# Patient Record
Sex: MALE | Race: WHITE | NOT HISPANIC OR LATINO | URBAN - METROPOLITAN AREA
[De-identification: names, ages, dates, MRNs, and addresses within clinical notes are randomized per-mention and may not be internally consistent; named-entity substitution may affect disease eponyms.]

---

## 2020-02-03 ENCOUNTER — HOSPITAL ENCOUNTER (INPATIENT)
Facility: HOSPITAL | Age: 25
LOS: 2 days | Discharge: HOME | DRG: 603 | End: 2020-02-05
Attending: EMERGENCY MEDICINE | Admitting: EMERGENCY MEDICINE

## 2020-02-03 DIAGNOSIS — L02.419 ABSCESS OF ANKLE: ICD-10-CM

## 2020-02-03 DIAGNOSIS — L03.116 CELLULITIS OF LEFT LOWER EXTREMITY: Primary | ICD-10-CM

## 2020-02-03 DIAGNOSIS — R00.0 TACHYCARDIA: ICD-10-CM

## 2020-02-03 DIAGNOSIS — L03.116 CELLULITIS OF LEFT ANKLE: ICD-10-CM

## 2020-02-03 PROBLEM — G80.8 OTHER CEREBRAL PALSY: Status: ACTIVE | Noted: 2020-02-03

## 2020-02-03 LAB
ALBUMIN SERPL BCP-MCNC: 3.8 G/DL (ref 3.5–5.2)
ALP SERPL-CCNC: 82 U/L (ref 55–135)
ALT SERPL W/O P-5'-P-CCNC: 27 U/L (ref 10–44)
ANION GAP SERPL CALC-SCNC: 13 MMOL/L (ref 8–16)
AST SERPL-CCNC: 22 U/L (ref 10–40)
BASOPHILS # BLD AUTO: 0.04 K/UL (ref 0–0.2)
BASOPHILS NFR BLD: 0.3 % (ref 0–1.9)
BILIRUB SERPL-MCNC: 0.6 MG/DL (ref 0.1–1)
BILIRUB UR QL STRIP: NEGATIVE
BUN SERPL-MCNC: 7 MG/DL (ref 6–20)
CALCIUM SERPL-MCNC: 10.1 MG/DL (ref 8.7–10.5)
CHLORIDE SERPL-SCNC: 100 MMOL/L (ref 95–110)
CLARITY UR: CLEAR
CO2 SERPL-SCNC: 25 MMOL/L (ref 23–29)
COLOR UR: YELLOW
CREAT SERPL-MCNC: 0.8 MG/DL (ref 0.5–1.4)
DIFFERENTIAL METHOD: ABNORMAL
EOSINOPHIL # BLD AUTO: 0 K/UL (ref 0–0.5)
EOSINOPHIL NFR BLD: 0.2 % (ref 0–8)
ERYTHROCYTE [DISTWIDTH] IN BLOOD BY AUTOMATED COUNT: 11.9 % (ref 11.5–14.5)
EST. GFR  (AFRICAN AMERICAN): >60 ML/MIN/1.73 M^2
EST. GFR  (NON AFRICAN AMERICAN): >60 ML/MIN/1.73 M^2
GLUCOSE SERPL-MCNC: 104 MG/DL (ref 70–110)
GLUCOSE UR QL STRIP: NEGATIVE
HCT VFR BLD AUTO: 46.4 % (ref 40–54)
HGB BLD-MCNC: 15.5 G/DL (ref 14–18)
HGB UR QL STRIP: NEGATIVE
HIV 1+2 AB+HIV1 P24 AG SERPL QL IA: NEGATIVE
IMM GRANULOCYTES # BLD AUTO: 0.07 K/UL (ref 0–0.04)
IMM GRANULOCYTES NFR BLD AUTO: 0.5 % (ref 0–0.5)
KETONES UR QL STRIP: NEGATIVE
LACTATE SERPL-SCNC: 1.4 MMOL/L (ref 0.5–2.2)
LACTATE SERPL-SCNC: 1.6 MMOL/L (ref 0.5–2.2)
LEUKOCYTE ESTERASE UR QL STRIP: NEGATIVE
LYMPHOCYTES # BLD AUTO: 1.3 K/UL (ref 1–4.8)
LYMPHOCYTES NFR BLD: 9.5 % (ref 18–48)
MCH RBC QN AUTO: 29.8 PG (ref 27–31)
MCHC RBC AUTO-ENTMCNC: 33.4 G/DL (ref 32–36)
MCV RBC AUTO: 89 FL (ref 82–98)
MONOCYTES # BLD AUTO: 1 K/UL (ref 0.3–1)
MONOCYTES NFR BLD: 7.1 % (ref 4–15)
NEUTROPHILS # BLD AUTO: 11.4 K/UL (ref 1.8–7.7)
NEUTROPHILS NFR BLD: 82.4 % (ref 38–73)
NITRITE UR QL STRIP: NEGATIVE
NRBC BLD-RTO: 0 /100 WBC
PH UR STRIP: 7 [PH] (ref 5–8)
PLATELET # BLD AUTO: 339 K/UL (ref 150–350)
PMV BLD AUTO: 10.4 FL (ref 9.2–12.9)
POTASSIUM SERPL-SCNC: 4.1 MMOL/L (ref 3.5–5.1)
PROT SERPL-MCNC: 8.5 G/DL (ref 6–8.4)
PROT UR QL STRIP: NEGATIVE
RBC # BLD AUTO: 5.21 M/UL (ref 4.6–6.2)
SODIUM SERPL-SCNC: 138 MMOL/L (ref 136–145)
SP GR UR STRIP: <=1.005 (ref 1–1.03)
URN SPEC COLLECT METH UR: ABNORMAL
UROBILINOGEN UR STRIP-ACNC: ABNORMAL EU/DL
WBC # BLD AUTO: 13.84 K/UL (ref 3.9–12.7)

## 2020-02-03 PROCEDURE — 63600175 PHARM REV CODE 636 W HCPCS: Performed by: EMERGENCY MEDICINE

## 2020-02-03 PROCEDURE — 93010 ELECTROCARDIOGRAM REPORT: CPT | Mod: ,,, | Performed by: INTERNAL MEDICINE

## 2020-02-03 PROCEDURE — 87040 BLOOD CULTURE FOR BACTERIA: CPT | Mod: 59

## 2020-02-03 PROCEDURE — 63600175 PHARM REV CODE 636 W HCPCS: Performed by: PHYSICIAN ASSISTANT

## 2020-02-03 PROCEDURE — 25500020 PHARM REV CODE 255: Performed by: EMERGENCY MEDICINE

## 2020-02-03 PROCEDURE — 11000001 HC ACUTE MED/SURG PRIVATE ROOM

## 2020-02-03 PROCEDURE — 81003 URINALYSIS AUTO W/O SCOPE: CPT

## 2020-02-03 PROCEDURE — 25000003 PHARM REV CODE 250: Performed by: EMERGENCY MEDICINE

## 2020-02-03 PROCEDURE — 86703 HIV-1/HIV-2 1 RESULT ANTBDY: CPT

## 2020-02-03 PROCEDURE — 85025 COMPLETE CBC W/AUTO DIFF WBC: CPT

## 2020-02-03 PROCEDURE — 83605 ASSAY OF LACTIC ACID: CPT

## 2020-02-03 PROCEDURE — 93010 EKG 12-LEAD: ICD-10-PCS | Mod: ,,, | Performed by: INTERNAL MEDICINE

## 2020-02-03 PROCEDURE — 25000003 PHARM REV CODE 250: Performed by: FAMILY MEDICINE

## 2020-02-03 PROCEDURE — 99285 EMERGENCY DEPT VISIT HI MDM: CPT | Mod: 25

## 2020-02-03 PROCEDURE — 96375 TX/PRO/DX INJ NEW DRUG ADDON: CPT

## 2020-02-03 PROCEDURE — 93005 ELECTROCARDIOGRAM TRACING: CPT

## 2020-02-03 PROCEDURE — 80053 COMPREHEN METABOLIC PANEL: CPT

## 2020-02-03 PROCEDURE — 10060 I&D ABSCESS SIMPLE/SINGLE: CPT

## 2020-02-03 PROCEDURE — 96365 THER/PROPH/DIAG IV INF INIT: CPT

## 2020-02-03 PROCEDURE — S0077 INJECTION, CLINDAMYCIN PHOSP: HCPCS | Performed by: EMERGENCY MEDICINE

## 2020-02-03 RX ORDER — CLINDAMYCIN PHOSPHATE 600 MG/50ML
600 INJECTION, SOLUTION INTRAVENOUS
Status: DISCONTINUED | OUTPATIENT
Start: 2020-02-04 | End: 2020-02-05 | Stop reason: HOSPADM

## 2020-02-03 RX ORDER — BACLOFEN 20 MG/1
20 TABLET ORAL 4 TIMES DAILY
COMMUNITY
End: 2020-05-15 | Stop reason: SDUPTHER

## 2020-02-03 RX ORDER — MORPHINE SULFATE 4 MG/ML
4 INJECTION, SOLUTION INTRAMUSCULAR; INTRAVENOUS
Status: COMPLETED | OUTPATIENT
Start: 2020-02-03 | End: 2020-02-03

## 2020-02-03 RX ORDER — CLINDAMYCIN PHOSPHATE 600 MG/50ML
600 INJECTION, SOLUTION INTRAVENOUS ONCE
Status: COMPLETED | OUTPATIENT
Start: 2020-02-03 | End: 2020-02-03

## 2020-02-03 RX ORDER — HYDROCODONE BITARTRATE AND ACETAMINOPHEN 5; 325 MG/1; MG/1
1 TABLET ORAL EVERY 6 HOURS PRN
Status: DISCONTINUED | OUTPATIENT
Start: 2020-02-03 | End: 2020-02-05 | Stop reason: HOSPADM

## 2020-02-03 RX ORDER — LIDOCAINE HYDROCHLORIDE 10 MG/ML
10 INJECTION, SOLUTION EPIDURAL; INFILTRATION; INTRACAUDAL; PERINEURAL
Status: COMPLETED | OUTPATIENT
Start: 2020-02-03 | End: 2020-02-03

## 2020-02-03 RX ORDER — HYDROMORPHONE HYDROCHLORIDE 2 MG/ML
1 INJECTION, SOLUTION INTRAMUSCULAR; INTRAVENOUS; SUBCUTANEOUS
Status: COMPLETED | OUTPATIENT
Start: 2020-02-03 | End: 2020-02-03

## 2020-02-03 RX ORDER — ONDANSETRON 2 MG/ML
4 INJECTION INTRAMUSCULAR; INTRAVENOUS
Status: COMPLETED | OUTPATIENT
Start: 2020-02-03 | End: 2020-02-03

## 2020-02-03 RX ORDER — IBUPROFEN 200 MG
1 TABLET ORAL DAILY
Status: DISCONTINUED | OUTPATIENT
Start: 2020-02-04 | End: 2020-02-05 | Stop reason: HOSPADM

## 2020-02-03 RX ORDER — DOCUSATE SODIUM 100 MG/1
100 CAPSULE, LIQUID FILLED ORAL
COMMUNITY
Start: 2016-11-01

## 2020-02-03 RX ORDER — DIVALPROEX SODIUM 250 MG/1
250 TABLET, DELAYED RELEASE ORAL
Status: ON HOLD | COMMUNITY
Start: 2020-01-15 | End: 2020-05-24

## 2020-02-03 RX ORDER — GABAPENTIN 300 MG/1
300 CAPSULE ORAL 3 TIMES DAILY
COMMUNITY
End: 2020-05-15 | Stop reason: SDUPTHER

## 2020-02-03 RX ADMIN — CLINDAMYCIN IN 5 PERCENT DEXTROSE 600 MG: 12 INJECTION, SOLUTION INTRAVENOUS at 11:02

## 2020-02-03 RX ADMIN — HYDROCODONE BITARTRATE AND ACETAMINOPHEN 1 TABLET: 5; 325 TABLET ORAL at 10:02

## 2020-02-03 RX ADMIN — MORPHINE SULFATE 4 MG: 4 INJECTION INTRAVENOUS at 04:02

## 2020-02-03 RX ADMIN — HYDROMORPHONE HYDROCHLORIDE 1 MG: 2 INJECTION INTRAMUSCULAR; INTRAVENOUS; SUBCUTANEOUS at 06:02

## 2020-02-03 RX ADMIN — SODIUM CHLORIDE 2052 ML: 0.9 INJECTION, SOLUTION INTRAVENOUS at 03:02

## 2020-02-03 RX ADMIN — LIDOCAINE HYDROCHLORIDE 100 MG: 10 INJECTION, SOLUTION EPIDURAL; INFILTRATION; INTRACAUDAL; PERINEURAL at 06:02

## 2020-02-03 RX ADMIN — ONDANSETRON 4 MG: 2 INJECTION INTRAMUSCULAR; INTRAVENOUS at 04:02

## 2020-02-03 RX ADMIN — IOHEXOL 75 ML: 350 INJECTION, SOLUTION INTRAVENOUS at 05:02

## 2020-02-03 RX ADMIN — CLINDAMYCIN IN 5 PERCENT DEXTROSE 600 MG: 12 INJECTION, SOLUTION INTRAVENOUS at 04:02

## 2020-02-03 NOTE — ED PROVIDER NOTES
2:15 PM: Left ankle abscess for a week.  Pt was placed back in lobby. I explained to pt that due to lack of available rooms pt was seen by myself to begin the workup. Pt understands they will be seen and dispositioned by the next available physician. Pt voices understanding and agrees with plan. Pt also understands the longer than normal wait time. I am removing myself from the care of pt and pt will now be assigned to the next available physician.     Chantell Beckham PA-C  2:15 PM      SCRIBE #1 NOTE: I, Samuel Gutierrez, am scribing for, and in the presence of, Navneet Taylor Do, MD. I have scribed the entire note.       History     Chief Complaint   Patient presents with    Abscess     L ankle x 1 week; redness, swelling, and tenderness to L ankle      Review of patient's allergies indicates:  No Known Allergies      History of Present Illness     HPI    2/3/2020, 4:15 PM  History obtained from the patient      History of Present Illness: David Mohr is a 24 y.o. male patient with a PMHx of cerebral palsy who presents to the Emergency Department for evaluation of an abscess to his left ankle which onset gradually 1 week PTA. Symptoms are constant and moderate in severity. No mitigating or exacerbating factors reported. Associated sxs include left ankle redness, left ankle swelling, left ankle warmth, and left ankle pain. Patient denies any fever chills, HA, dizziness, N/V/D, and all other sxs at this time. No prior Tx reported. No further complaints or concerns at this time.       Arrival mode: Personal vehicle    PCP: María Wade MD        Past Medical History:  Cerebral palsy    Past Surgical History:  History reviewed. No pertinent surgical history.    Family History:  History reviewed. No pertinent family history.    Social History:  Social History Main Topics    Smoking status: Unknown if ever smoked    Smokeless tobacco: Unknown if ever used    Alcohol Use: Unknown drinking history    Drug Use: Unknown  if ever used    Sexual Activity: Unknown        Review of Systems     Review of Systems   Constitutional: Negative for chills and fever.   HENT: Negative for sore throat.    Respiratory: Negative for cough and shortness of breath.    Cardiovascular: Negative for chest pain and leg swelling.   Gastrointestinal: Negative for abdominal pain, diarrhea, nausea and vomiting.   Genitourinary: Negative for dysuria.   Musculoskeletal: Positive for arthralgias (L ankle) and joint swelling (L ankle). Negative for back pain, neck pain and neck stiffness.   Skin: Positive for color change (L ankle redness). Negative for rash and wound.        (+) Left ankle warmth   Neurological: Negative for dizziness, weakness, light-headedness, numbness and headaches.   Hematological: Does not bruise/bleed easily.   All other systems reviewed and are negative.     Physical Exam     Initial Vitals [02/03/20 1405]   BP Pulse Resp Temp SpO2   138/79 (!) 122 20 98.3 °F (36.8 °C) 98 %      MAP       --          Physical Exam  Nursing Notes and Vital Signs Reviewed.  Constitutional: Patient is in no acute distress. Well-developed and well-nourished.  Head: Atraumatic. Normocephalic.  Eyes: EOM intact. Conjunctivae are not pale. No scleral icterus.  ENT: Mucous membranes are moist. Oropharynx is clear and symmetric.    Neck: Supple. Full ROM. Cardiovascular: Regular rate. Regular rhythm. No murmurs, rubs, or gallops. Distal pulses are 2+ and symmetric.  Pulmonary/Chest: No respiratory distress. Clear to auscultation bilaterally. No wheezing or rales.  Abdominal: Soft and non-distended.  There is no tenderness.  No rebound, guarding, or rigidity. Good bowel sounds.  Genitourinary: No CVA tenderness  Musculoskeletal: Pt is parapleigic; does not move his legs. No calf tenderness.  LLE: Circumferential cellulitis to the left lower leg. Swelling and fluctuance over the medial malleolus consistent with an abscess. Slight redness that tracks up the medial  "thigh. Cap refill distally is <2 seconds. No distal sensory deficit.  Skin: Warm and dry.  Neurological:  Alert, awake, and appropriate.  Normal speech.  No acute focal neurological deficits are appreciated.  Psychiatric: Normal affect. Good eye contact. Appropriate in content.                         ED Course   I & D - Incision and Drainage  Date/Time: 2/3/2020 6:02 PM  Performed by: Navneet Taylor Do, MD  Authorized by: Navneet Taylor Do, MD   Consent Done: Yes  Consent: Verbal consent obtained.  Risks and benefits: risks, benefits and alternatives were discussed  Consent given by: patient  Patient understanding: patient states understanding of the procedure being performed  Patient consent: the patient's understanding of the procedure matches consent given  Procedure consent: procedure consent matches procedure scheduled  Relevant documents: relevant documents present and verified  Test results: test results available and properly labeled  Site marked: the operative site was marked  Imaging studies: imaging studies available  Required items: required blood products, implants, devices, and special equipment available  Patient identity confirmed:  and name  Time out: Immediately prior to procedure a "time out" was called to verify the correct patient, procedure, equipment, support staff and site/side marked as required.  Type: abscess  Body area: lower extremity  Location details: left ankle  Anesthesia: local infiltration    Anesthesia:  Local Anesthetic: lidocaine 1% without epinephrine  Anesthetic total: 5 mL  Patient sedated: no  Scalpel size: 11  Incision type: single straight  Complexity: simple  Drainage: pus  Drainage amount: copious  Wound treatment: incision,  drainage and  wound packed  Packing material: 1/2 in iodoform gauze  Complications: No  Patient tolerance: Patient tolerated the procedure well with no immediate complications        ED Vital Signs:  Vitals:    20 1405   BP: 138/79 " "  Pulse: (!) 122   Resp: 20   Temp: 98.3 °F (36.8 °C)   TempSrc: Oral   SpO2: 98%   Weight: 72.6 kg (160 lb)   Height: 5' 8" (1.727 m)       Abnormal Lab Results:  Labs Reviewed   CBC W/ AUTO DIFFERENTIAL - Abnormal; Notable for the following components:       Result Value    WBC 13.84 (*)     Gran # (ANC) 11.4 (*)     Immature Grans (Abs) 0.07 (*)     Gran% 82.4 (*)     Lymph% 9.5 (*)     All other components within normal limits   COMPREHENSIVE METABOLIC PANEL - Abnormal; Notable for the following components:    Total Protein 8.5 (*)     All other components within normal limits   CULTURE, BLOOD   CULTURE, BLOOD   HIV 1 / 2 ANTIBODY   LACTIC ACID, PLASMA   URINALYSIS, REFLEX TO URINE CULTURE   LACTIC ACID, PLASMA        All Lab Results:  Results for orders placed or performed during the hospital encounter of 02/03/20   HIV 1/2 Ag/Ab (4th Gen)   Result Value Ref Range    HIV 1/2 Ag/Ab Negative Negative   CBC auto differential   Result Value Ref Range    WBC 13.84 (H) 3.90 - 12.70 K/uL    RBC 5.21 4.60 - 6.20 M/uL    Hemoglobin 15.5 14.0 - 18.0 g/dL    Hematocrit 46.4 40.0 - 54.0 %    Mean Corpuscular Volume 89 82 - 98 fL    Mean Corpuscular Hemoglobin 29.8 27.0 - 31.0 pg    Mean Corpuscular Hemoglobin Conc 33.4 32.0 - 36.0 g/dL    RDW 11.9 11.5 - 14.5 %    Platelets 339 150 - 350 K/uL    MPV 10.4 9.2 - 12.9 fL    Immature Granulocytes 0.5 0.0 - 0.5 %    Gran # (ANC) 11.4 (H) 1.8 - 7.7 K/uL    Immature Grans (Abs) 0.07 (H) 0.00 - 0.04 K/uL    Lymph # 1.3 1.0 - 4.8 K/uL    Mono # 1.0 0.3 - 1.0 K/uL    Eos # 0.0 0.0 - 0.5 K/uL    Baso # 0.04 0.00 - 0.20 K/uL    nRBC 0 0 /100 WBC    Gran% 82.4 (H) 38.0 - 73.0 %    Lymph% 9.5 (L) 18.0 - 48.0 %    Mono% 7.1 4.0 - 15.0 %    Eosinophil% 0.2 0.0 - 8.0 %    Basophil% 0.3 0.0 - 1.9 %    Differential Method Automated    Comprehensive metabolic panel   Result Value Ref Range    Sodium 138 136 - 145 mmol/L    Potassium 4.1 3.5 - 5.1 mmol/L    Chloride 100 95 - 110 mmol/L    CO2 " 25 23 - 29 mmol/L    Glucose 104 70 - 110 mg/dL    BUN, Bld 7 6 - 20 mg/dL    Creatinine 0.8 0.5 - 1.4 mg/dL    Calcium 10.1 8.7 - 10.5 mg/dL    Total Protein 8.5 (H) 6.0 - 8.4 g/dL    Albumin 3.8 3.5 - 5.2 g/dL    Total Bilirubin 0.6 0.1 - 1.0 mg/dL    Alkaline Phosphatase 82 55 - 135 U/L    AST 22 10 - 40 U/L    ALT 27 10 - 44 U/L    Anion Gap 13 8 - 16 mmol/L    eGFR if African American >60 >60 mL/min/1.73 m^2    eGFR if non African American >60 >60 mL/min/1.73 m^2   Lactic acid, plasma #1   Result Value Ref Range    Lactate (Lactic Acid) 1.6 0.5 - 2.2 mmol/L       Imaging Results:  Imaging Results          X-Ray Ankle Complete Left (Final result)  Result time 02/03/20 14:37:58    Final result by Farzad Jasmine MD (02/03/20 14:37:58)                 Impression:      As above.      Electronically signed by: Farzad Jasmine  Date:    02/03/2020  Time:    14:37             Narrative:    EXAMINATION:  XR ANKLE COMPLETE 3 VIEW LEFT    CLINICAL HISTORY:  Cellulitis of left lower limb    TECHNIQUE:  AP, lateral and oblique views of the left ankle were performed.    COMPARISON:  None    FINDINGS:  Soft tissue swelling medial ankle.  No fracture or dislocation.  Joint spaces appear well maintained.                               X-Ray Chest AP Portable (Final result)  Result time 02/03/20 14:36:47    Final result by Farzad Jasmine MD (02/03/20 14:36:47)                 Impression:      No acute abnormality.      Electronically signed by: Farzad Jasmine  Date:    02/03/2020  Time:    14:36             Narrative:    EXAMINATION:  XR CHEST AP PORTABLE    CLINICAL HISTORY:  Sepsis;.    TECHNIQUE:  Single frontal portable view of the chest was performed.    COMPARISON:  None    FINDINGS:  Support devices: None    The lungs are clear, with normal appearance of pulmonary vasculature and no pleural effusion or pneumothorax.    The cardiac silhouette is normal in size. The hilar and mediastinal contours are unremarkable.    Bones are  intact.                                 The EKG was ordered, reviewed, and independently interpreted by the ED provider.  Interpretation time: 1534  Rate: 104 BPM  Rhythm: sinus tachycardia  Interpretation: No acute ST changes. No STEMI.           The Emergency Provider reviewed the vital signs and test results, which are outlined above.     ED Discussion     4:41 PM: Discussed case with Jl Koehler MD ([facility]). Dr. Koehler agrees with current care and management of pt and accepts admission.   Admitting Service: [facility]  Admitting Physician: Dr. Koehler  Admit to: IP/Med-Surg    4:52 PM: Re-evaluated pt. I have discussed test results, shared treatment plan, and the need for admission with patient and family at bedside. Pt and family express understanding at this time and agree with all information. All questions answered. Pt and family have no further questions or concerns at this time. Pt is ready for admit.         Medical Decision Making:   Clinical Tests:   Lab Tests: Ordered and Reviewed  Radiological Study: Ordered and Reviewed  Medical Tests: Ordered and Reviewed           ED Medication(s):  Medications   lidocaine (PF) 10 mg/ml (1%) injection 100 mg (has no administration in time range)   sodium chloride 0.9% bolus 2,052 mL (2,052 mLs Intravenous New Bag 2/3/20 1554)   clindamycin 600 MG/50 ML D5W 600 mg/50 mL IVPB 600 mg (600 mg Intravenous New Bag 2/3/20 1617)   morphine injection 4 mg (4 mg Intravenous Given 2/3/20 1621)   ondansetron injection 4 mg (4 mg Intravenous Given 2/3/20 1620)       New Prescriptions    No medications on file               Scribe Attestation:   Scribe #1: I performed the above scribed service and the documentation accurately describes the services I performed. I attest to the accuracy of the note.     Attending:   Physician Attestation Statement for Scribe #1: I, Navneet Taylor Do, MD, personally performed the services described in this documentation, as  scribed by Samuel Gutierrez, in my presence, and it is both accurate and complete.           Clinical Impression       ICD-10-CM ICD-9-CM   1. Cellulitis of left lower extremity L03.116 682.6   2. Tachycardia R00.0 785.0   3. Cellulitis of left ankle L03.116 682.6   4. Abscess of ankle L02.419 682.6       Disposition:   Disposition: Admitted  Condition: Fair       Navneet Taylor Do, MD  02/03/20 5216

## 2020-02-04 PROBLEM — Z72.0 TOBACCO USE: Status: ACTIVE | Noted: 2020-02-04

## 2020-02-04 LAB
ANION GAP SERPL CALC-SCNC: 9 MMOL/L (ref 8–16)
BASOPHILS # BLD AUTO: 0.06 K/UL (ref 0–0.2)
BASOPHILS NFR BLD: 0.6 % (ref 0–1.9)
BUN SERPL-MCNC: 9 MG/DL (ref 6–20)
CALCIUM SERPL-MCNC: 8.8 MG/DL (ref 8.7–10.5)
CHLORIDE SERPL-SCNC: 106 MMOL/L (ref 95–110)
CO2 SERPL-SCNC: 25 MMOL/L (ref 23–29)
CREAT SERPL-MCNC: 0.8 MG/DL (ref 0.5–1.4)
DIFFERENTIAL METHOD: ABNORMAL
EOSINOPHIL # BLD AUTO: 0.2 K/UL (ref 0–0.5)
EOSINOPHIL NFR BLD: 2.2 % (ref 0–8)
ERYTHROCYTE [DISTWIDTH] IN BLOOD BY AUTOMATED COUNT: 12 % (ref 11.5–14.5)
EST. GFR  (AFRICAN AMERICAN): >60 ML/MIN/1.73 M^2
EST. GFR  (NON AFRICAN AMERICAN): >60 ML/MIN/1.73 M^2
GLUCOSE SERPL-MCNC: 90 MG/DL (ref 70–110)
HCT VFR BLD AUTO: 38.2 % (ref 40–54)
HGB BLD-MCNC: 12.3 G/DL (ref 14–18)
IMM GRANULOCYTES # BLD AUTO: 0.06 K/UL (ref 0–0.04)
IMM GRANULOCYTES NFR BLD AUTO: 0.6 % (ref 0–0.5)
LYMPHOCYTES # BLD AUTO: 2 K/UL (ref 1–4.8)
LYMPHOCYTES NFR BLD: 20.8 % (ref 18–48)
MCH RBC QN AUTO: 29.3 PG (ref 27–31)
MCHC RBC AUTO-ENTMCNC: 32.2 G/DL (ref 32–36)
MCV RBC AUTO: 91 FL (ref 82–98)
MONOCYTES # BLD AUTO: 1.3 K/UL (ref 0.3–1)
MONOCYTES NFR BLD: 13.2 % (ref 4–15)
NEUTROPHILS # BLD AUTO: 5.9 K/UL (ref 1.8–7.7)
NEUTROPHILS NFR BLD: 62.6 % (ref 38–73)
NRBC BLD-RTO: 0 /100 WBC
PLATELET # BLD AUTO: 292 K/UL (ref 150–350)
PMV BLD AUTO: 10.6 FL (ref 9.2–12.9)
POTASSIUM SERPL-SCNC: 4.1 MMOL/L (ref 3.5–5.1)
RBC # BLD AUTO: 4.2 M/UL (ref 4.6–6.2)
SODIUM SERPL-SCNC: 140 MMOL/L (ref 136–145)
WBC # BLD AUTO: 9.47 K/UL (ref 3.9–12.7)

## 2020-02-04 PROCEDURE — 36415 COLL VENOUS BLD VENIPUNCTURE: CPT

## 2020-02-04 PROCEDURE — 25000003 PHARM REV CODE 250: Performed by: EMERGENCY MEDICINE

## 2020-02-04 PROCEDURE — 80048 BASIC METABOLIC PNL TOTAL CA: CPT

## 2020-02-04 PROCEDURE — 25000003 PHARM REV CODE 250: Performed by: FAMILY MEDICINE

## 2020-02-04 PROCEDURE — 99223 PR INITIAL HOSPITAL CARE,LEVL III: ICD-10-PCS | Mod: ,,, | Performed by: PODIATRIST

## 2020-02-04 PROCEDURE — 25000003 PHARM REV CODE 250: Performed by: NURSE PRACTITIONER

## 2020-02-04 PROCEDURE — S0077 INJECTION, CLINDAMYCIN PHOSP: HCPCS | Performed by: EMERGENCY MEDICINE

## 2020-02-04 PROCEDURE — 85025 COMPLETE CBC W/AUTO DIFF WBC: CPT

## 2020-02-04 PROCEDURE — 99223 1ST HOSP IP/OBS HIGH 75: CPT | Mod: ,,, | Performed by: PODIATRIST

## 2020-02-04 PROCEDURE — 11000001 HC ACUTE MED/SURG PRIVATE ROOM

## 2020-02-04 PROCEDURE — S4991 NICOTINE PATCH NONLEGEND: HCPCS | Performed by: NURSE PRACTITIONER

## 2020-02-04 RX ORDER — BACLOFEN 10 MG/1
20 TABLET ORAL 4 TIMES DAILY
Status: DISCONTINUED | OUTPATIENT
Start: 2020-02-04 | End: 2020-02-05 | Stop reason: HOSPADM

## 2020-02-04 RX ORDER — DIVALPROEX SODIUM 250 MG/1
250 TABLET, DELAYED RELEASE ORAL EVERY 12 HOURS
Status: DISCONTINUED | OUTPATIENT
Start: 2020-02-04 | End: 2020-02-05 | Stop reason: HOSPADM

## 2020-02-04 RX ORDER — GABAPENTIN 300 MG/1
300 CAPSULE ORAL 3 TIMES DAILY
Status: DISCONTINUED | OUTPATIENT
Start: 2020-02-04 | End: 2020-02-05 | Stop reason: HOSPADM

## 2020-02-04 RX ORDER — DOCUSATE SODIUM 100 MG/1
100 CAPSULE, LIQUID FILLED ORAL DAILY
Status: DISCONTINUED | OUTPATIENT
Start: 2020-02-04 | End: 2020-02-05 | Stop reason: HOSPADM

## 2020-02-04 RX ADMIN — BACLOFEN 20 MG: 10 TABLET ORAL at 09:02

## 2020-02-04 RX ADMIN — HYDROCODONE BITARTRATE AND ACETAMINOPHEN 1 TABLET: 5; 325 TABLET ORAL at 10:02

## 2020-02-04 RX ADMIN — BACLOFEN 20 MG: 10 TABLET ORAL at 01:02

## 2020-02-04 RX ADMIN — CLINDAMYCIN IN 5 PERCENT DEXTROSE 600 MG: 12 INJECTION, SOLUTION INTRAVENOUS at 07:02

## 2020-02-04 RX ADMIN — HYDROCODONE BITARTRATE AND ACETAMINOPHEN 1 TABLET: 5; 325 TABLET ORAL at 05:02

## 2020-02-04 RX ADMIN — CLINDAMYCIN IN 5 PERCENT DEXTROSE 600 MG: 12 INJECTION, SOLUTION INTRAVENOUS at 03:02

## 2020-02-04 RX ADMIN — GABAPENTIN 300 MG: 300 CAPSULE ORAL at 03:02

## 2020-02-04 RX ADMIN — CLINDAMYCIN IN 5 PERCENT DEXTROSE 600 MG: 12 INJECTION, SOLUTION INTRAVENOUS at 11:02

## 2020-02-04 RX ADMIN — DOCUSATE SODIUM 100 MG: 100 CAPSULE, LIQUID FILLED ORAL at 09:02

## 2020-02-04 RX ADMIN — GABAPENTIN 300 MG: 300 CAPSULE ORAL at 09:02

## 2020-02-04 RX ADMIN — BACLOFEN 20 MG: 10 TABLET ORAL at 05:02

## 2020-02-04 RX ADMIN — Medication 1 PATCH: at 09:02

## 2020-02-04 RX ADMIN — HYDROCODONE BITARTRATE AND ACETAMINOPHEN 1 TABLET: 5; 325 TABLET ORAL at 04:02

## 2020-02-04 NOTE — HOSPITAL COURSE
Mr Mohr is a 24 year old male with PMHx of Cerebral palsy who presented to [facility] with ankle abscess, which was I & D in the Emergency Room. He was started IV antibiotic and Podiatry was consulted, Podiatry recommend continuing IV antibiotic and upon D/C, follow up at 7-10 days. D/C with PO ABx. Today, patient is feeling well, vital signs stable. Patient does not have any insurance,  consult to arrange follow up with PCP. Orders given for dressing changes and to instruct patient on dressing changes as well. PO antibiotic prescribe [facility]. Patient was seen, examined and deemed suitable for discharge.

## 2020-02-04 NOTE — ED NOTES
I&D done per Dr. Black. Large amount of yellow, pale green and blood drainage expressed from left medial ankle.  1/2 inch medicated packing placed into the wound. Pt tolerated procedure well.  Loose gauze placed over the wound.

## 2020-02-04 NOTE — PLAN OF CARE
CM met with patient at bedside to assess for discharge needs. Pt states that he lives at home with his mother and is dependent with some needs. He has CP and is using a wheelchair for assistance. He states that he is not current with home health, and he doesn't have any insurance. He said he applied for Medicaid. He states that he hasnt been taking his medicine because he cant afford it and hopes that if accepted by medicaid he will be able to resume. CM provided a transitional care folder, information on advanced directives, information on pharmacy bedside delivery, and discharge planning begins on admission with contact information for any needs/questions.    D/C Plan: Home  PCP: Najma Wade MD  Preferred Pharmacy: [facility]  Discharge transportation: Family  My [facility]: Send link  Pharmacy Bedside Delivery: Yes       02/04/20 0900   Discharge Assessment   Assessment Type Discharge Planning Assessment   Confirmed/corrected address and phone number on facesheet? Yes   Assessment information obtained from? Patient   Expected Length of Stay (days)   (TBD)   Communicated expected length of stay with patient/caregiver yes   Prior to hospitilization cognitive status: Alert/Oriented   Prior to hospitalization functional status: Independent;Assistive Equipment   Current cognitive status: Alert/Oriented   Current Functional Status: Independent;Assistive Equipment   Facility Arrived From: Home   Lives With parent(s)   Able to Return to Prior Arrangements yes   Is patient able to care for self after discharge? Yes   Who are your caregiver(s) and their phone number(s)? Radha Worrell, Mother- [phone]   Patient's perception of discharge disposition home or selfcare   Readmission Within the Last 30 Days no previous admission in last 30 days   Patient currently being followed by outpatient case management? No   Patient currently receives any other outside agency services? No   Equipment Currently Used at Home  wheelchair;walker, standard   Do you have any problems affording any of your prescribed medications? Yes   If yes, what medications? All of them   Is the patient taking medications as prescribed? no   Does the patient have transportation home? Yes   Transportation Anticipated family or friend will provide   Dialysis Name and Scheduled days NA   Does the patient receive services at the [facility]mad[facility] Clinic? No   Discharge Plan A Home with family   DME Needed Upon Discharge  none   Patient/Family in Agreement with Plan yes

## 2020-02-04 NOTE — PLAN OF CARE
Chart reviewed, pt resting in bed with call light and personal belongings within reach. Elevation to pts BLE due to swelling.  consult ordered due to pt having difficulty affording needed medication because of no insurance coverage. Pt skin has little wounds all on legs, pt stated he crawls on his legs often for work and it causes them to get infected often. Vitals stable. Pain controled with ordered meds. IV antibiotics given as ordered. Pt absent of injury throughout shift, will continue to monitor.

## 2020-02-04 NOTE — ASSESSMENT & PLAN NOTE
Large abscess L Ankle medially s/p I&D  Continue IV Clindamycin  Wound care consult  Podiatry consult- may need further exploration  Control pain with IV Dilaudid/ Percocet prn

## 2020-02-04 NOTE — ASSESSMENT & PLAN NOTE
Large abscess L Ankle medially s/p I&D  Continue IV Clindamycin  Wound care consult  Podiatry consult- may need further exploration  Control pain with IV Dilaudid/ Percocet prn    2/4  Continue Clindamycin IV   Podiatry following, Upon D/C, follow up at 7-10 days. D/C with PO ABx

## 2020-02-04 NOTE — PLAN OF CARE
ALEXEY received a message from DEEP [place] that she has discussed this patient with the [employer] rep.  Application was submitted December 2019 and denied on 1-9-2020 due to over income.

## 2020-02-04 NOTE — SUBJECTIVE & OBJECTIVE
Interval History: Pt seen and examined, no distress, vital signs and labs stable will monitor.     Review of Systems   Constitutional: Negative for chills, diaphoresis, fatigue and fever.   HENT: Negative for congestion, ear discharge, rhinorrhea, sinus pressure, sore throat and trouble swallowing.    Eyes: Negative for discharge and visual disturbance.   Respiratory: Negative for apnea, cough, choking, chest tightness, shortness of breath, wheezing and stridor.    Cardiovascular: Negative for chest pain, palpitations and leg swelling.   Gastrointestinal: Negative for abdominal distention, abdominal pain, diarrhea, nausea and vomiting.   Endocrine: Negative for cold intolerance and heat intolerance.   Genitourinary: Negative for dysuria, frequency and hematuria.   Musculoskeletal: Negative for arthralgias, back pain, myalgias and neck pain.        Left Ankle pain    Skin: Negative for pallor and rash.   Neurological: Negative for dizziness, seizures, syncope, weakness and headaches.   Psychiatric/Behavioral: Negative for agitation, confusion and sleep disturbance.     Objective:     Vital Signs (Most Recent):  Temp: 98.3 °F (36.8 °C) (02/04/20 1648)  Pulse: 97 (02/04/20 1648)  Resp: 16 (02/04/20 1648)  BP: (!) 128/58 (02/04/20 1648)  SpO2: 97 % (02/04/20 1648) Vital Signs (24h Range):  Temp:  [97.5 °F (36.4 °C)-98.8 °F (37.1 °C)] 98.3 °F (36.8 °C)  Pulse:  [] 97  Resp:  [13-20] 16  SpO2:  [94 %-100 %] 97 %  BP: (107-142)/(55-75) 128/58     Weight: 81.6 kg (179 lb 14.3 oz)  Body mass index is 27.35 kg/m².    Intake/Output Summary (Last 24 hours) at 2/4/2020 1710  Last data filed at 2/4/2020 1400  Gross per 24 hour   Intake 770 ml   Output 3150 ml   Net -2380 ml      Physical Exam   Constitutional: He is oriented to person, place, and time. No distress.   Eyes: Right eye exhibits no discharge. Left eye exhibits no discharge.   Cardiovascular: Exam reveals no gallop and no friction rub.   No murmur  heard.  Pulmonary/Chest: No stridor. No respiratory distress. He has no wheezes. He has no rales. He exhibits no tenderness.   Abdominal: He exhibits no distension and no mass. There is no tenderness. There is no rebound and no guarding. No hernia.   Musculoskeletal: He exhibits no edema or deformity.   Left ankle dressing intact    Neurological: He is alert and oriented to person, place, and time.   Skin: Skin is warm and dry. Capillary refill takes less than 2 seconds. He is not diaphoretic.   Nursing note and vitals reviewed.      Significant Labs:   CBC:   Recent Labs   Lab 02/03/20  1449 02/04/20  0417   WBC 13.84* 9.47   HGB 15.5 12.3*   HCT 46.4 38.2*    292     CMP:   Recent Labs   Lab 02/03/20  1449 02/04/20 0417    140   K 4.1 4.1    106   CO2 25 25    90   BUN 7 9   CREATININE 0.8 0.8   CALCIUM 10.1 8.8   PROT 8.5*  --    ALBUMIN 3.8  --    BILITOT 0.6  --    ALKPHOS 82  --    AST 22  --    ALT 27  --    ANIONGAP 13 9   EGFRNONAA >60 >60       Significant Imaging:     Imaging Results          CT Ankle (Including Hindfoot) With Contrast Left (Final result)  Result time 02/03/20 17:50:24    Final result by Griffin Lara MD (02/03/20 17:50:24)                 Impression:      5 x 2.1 cm peripherally enhancing fluid collection seen adjacent to the medial malleolus consistent with abscess.  Moderate cellulitis.    All CT scans at this facility use dose modulation, iterative reconstruction, and/or weight base dosing when appropriate to reduce radiation dose to as low as reasonably achievable.      Electronically signed by: Griffin Lara MD  Date:    02/03/2020  Time:    17:50             Narrative:    EXAMINATION:  CT ANKLE (INCLUDING HINDFOOT) WITH CONTRAST LEFT    CLINICAL HISTORY:  abscess;    TECHNIQUE:  1.25 mm axial contrast CT images were obtained through the left ankle using soft tissue and bony algorithm after administration of 75 cc of Omni 350 con.  Sagittal coronal  reformats were also submitted for interpretation.    COMPARISON:  02/02/2020    FINDINGS:  5 x 2.1 cm peripherally enhancing fluid collection seen adjacent to the medial malleolus consistent with abscess.  Trace joint effusion.  No acute fracture or dislocation.  Diffuse osteopenia.  Moderate degenerative changes of the ankle joint.    Diffuse soft tissue swelling consistent with cellulitis.  Sclerotic focus at the epiphysis of the tibia and within the talus thought to reflect bone islands.  Vascular structures appear patent.                               X-Ray Ankle Complete Left (Final result)  Result time 02/03/20 14:37:58    Final result by Farzad Jasmine MD (02/03/20 14:37:58)                 Impression:      As above.      Electronically signed by: Farzad Jasmine  Date:    02/03/2020  Time:    14:37             Narrative:    EXAMINATION:  XR ANKLE COMPLETE 3 VIEW LEFT    CLINICAL HISTORY:  Cellulitis of left lower limb    TECHNIQUE:  AP, lateral and oblique views of the left ankle were performed.    COMPARISON:  None    FINDINGS:  Soft tissue swelling medial ankle.  No fracture or dislocation.  Joint spaces appear well maintained.                               X-Ray Chest AP Portable (Final result)  Result time 02/03/20 14:36:47    Final result by Farzad Jasmine MD (02/03/20 14:36:47)                 Impression:      No acute abnormality.      Electronically signed by: Farzad Jasmine  Date:    02/03/2020  Time:    14:36             Narrative:    EXAMINATION:  XR CHEST AP PORTABLE    CLINICAL HISTORY:  Sepsis;.    TECHNIQUE:  Single frontal portable view of the chest was performed.    COMPARISON:  None    FINDINGS:  Support devices: None    The lungs are clear, with normal appearance of pulmonary vasculature and no pleural effusion or pneumothorax.    The cardiac silhouette is normal in size. The hilar and mediastinal contours are unremarkable.    Bones are intact.

## 2020-02-04 NOTE — ED NOTES
Introduced self to patient. Oriented to environment. Explained process and plan of care. Received patient from waiting room.

## 2020-02-04 NOTE — SUBJECTIVE & OBJECTIVE
Past Medical History:   Diagnosis Date    Bipolar 1 disorder     Cerebral palsy        Past Surgical History:   Procedure Laterality Date    ABSCESS DRAINAGE      ham string      medication pump      baclofen pump removal       Review of patient's allergies indicates:   Allergen Reactions    Aspirin Anaphylaxis       No current facility-administered medications on file prior to encounter.      Current Outpatient Medications on File Prior to Encounter   Medication Sig    baclofen (LIORESAL) 20 MG tablet Take 20 mg by mouth 4 (four) times daily.    divalproex (DEPAKOTE) 250 MG EC tablet Take 250 mg by mouth.    docusate sodium (COLACE) 100 MG capsule Take 100 mg by mouth.    gabapentin (NEURONTIN) 300 MG capsule Take 300 mg by mouth 3 (three) times daily.     Family History     None        Tobacco Use    Smoking status: Current Some Day Smoker    Smokeless tobacco: Current User     Types: Snuff   Substance and Sexual Activity    Alcohol use: Yes     Comment: socially    Drug use: Yes     Types: Marijuana    Sexual activity: Not on file     Review of Systems   Constitutional: Positive for activity change and appetite change. Negative for chills, diaphoresis, fatigue and fever.   HENT: Negative.    Eyes: Negative.    Respiratory: Negative.    Cardiovascular: Negative.    Gastrointestinal: Negative.    Endocrine: Negative.    Genitourinary: Negative.    Musculoskeletal: Positive for gait problem.   Skin: Positive for wound (abscess L Ankle).   Neurological: Positive for weakness.   Hematological: Negative.    Psychiatric/Behavioral: Negative.      Objective:     Vital Signs (Most Recent):  Temp: 98.8 °F (37.1 °C) (02/03/20 2330)  Pulse: 101 (02/03/20 2330)  Resp: 20 (02/03/20 2330)  BP: (!) 115/55 (02/03/20 2330)  SpO2: (!) 94 % (02/03/20 2330) Vital Signs (24h Range):  Temp:  [98.1 °F (36.7 °C)-98.8 °F (37.1 °C)] 98.8 °F (37.1 °C)  Pulse:  [] 101  Resp:  [13-20] 20  SpO2:  [94 %-100 %] 94 %  BP:  (115-142)/(55-79) 115/55     Weight: 81.6 kg (179 lb 14.3 oz)  Body mass index is 27.35 kg/m².    Physical Exam   Constitutional: He is oriented to person, place, and time. He appears well-developed and well-nourished. No distress.   Adult male with mild Cerebral Palsy lying in bed comfortably, AAOx3   HENT:   Head: Normocephalic and atraumatic.   Mouth/Throat: Oropharynx is clear and moist.   Eyes: Pupils are equal, round, and reactive to light. Conjunctivae and EOM are normal.   Neck: Normal range of motion. Neck supple. No JVD present.   Cardiovascular: Normal rate, regular rhythm, normal heart sounds and intact distal pulses.   Pulmonary/Chest: Effort normal and breath sounds normal.   Abdominal: Soft. Bowel sounds are normal.   Genitourinary:   Genitourinary Comments: deferred   Musculoskeletal: He exhibits edema, tenderness (L ankle) and deformity.   Neurological: He is alert and oriented to person, place, and time.   Skin: Skin is warm and dry. He is not diaphoretic.   Psychiatric: He has a normal mood and affect. His behavior is normal. Thought content normal.   Nursing note and vitals reviewed.        CRANIAL NERVES     CN III, IV, VI   Pupils are equal, round, and reactive to light.  Extraocular motions are normal.     Results for orders placed or performed during the hospital encounter of 02/03/20   HIV 1/2 Ag/Ab (4th Gen)   Result Value Ref Range    HIV 1/2 Ag/Ab Negative Negative   CBC auto differential   Result Value Ref Range    WBC 13.84 (H) 3.90 - 12.70 K/uL    RBC 5.21 4.60 - 6.20 M/uL    Hemoglobin 15.5 14.0 - 18.0 g/dL    Hematocrit 46.4 40.0 - 54.0 %    Mean Corpuscular Volume 89 82 - 98 fL    Mean Corpuscular Hemoglobin 29.8 27.0 - 31.0 pg    Mean Corpuscular Hemoglobin Conc 33.4 32.0 - 36.0 g/dL    RDW 11.9 11.5 - 14.5 %    Platelets 339 150 - 350 K/uL    MPV 10.4 9.2 - 12.9 fL    Immature Granulocytes 0.5 0.0 - 0.5 %    Gran # (ANC) 11.4 (H) 1.8 - 7.7 K/uL    Immature Grans (Abs) 0.07 (H)  0.00 - 0.04 K/uL    Lymph # 1.3 1.0 - 4.8 K/uL    Mono # 1.0 0.3 - 1.0 K/uL    Eos # 0.0 0.0 - 0.5 K/uL    Baso # 0.04 0.00 - 0.20 K/uL    nRBC 0 0 /100 WBC    Gran% 82.4 (H) 38.0 - 73.0 %    Lymph% 9.5 (L) 18.0 - 48.0 %    Mono% 7.1 4.0 - 15.0 %    Eosinophil% 0.2 0.0 - 8.0 %    Basophil% 0.3 0.0 - 1.9 %    Differential Method Automated    Comprehensive metabolic panel   Result Value Ref Range    Sodium 138 136 - 145 mmol/L    Potassium 4.1 3.5 - 5.1 mmol/L    Chloride 100 95 - 110 mmol/L    CO2 25 23 - 29 mmol/L    Glucose 104 70 - 110 mg/dL    BUN, Bld 7 6 - 20 mg/dL    Creatinine 0.8 0.5 - 1.4 mg/dL    Calcium 10.1 8.7 - 10.5 mg/dL    Total Protein 8.5 (H) 6.0 - 8.4 g/dL    Albumin 3.8 3.5 - 5.2 g/dL    Total Bilirubin 0.6 0.1 - 1.0 mg/dL    Alkaline Phosphatase 82 55 - 135 U/L    AST 22 10 - 40 U/L    ALT 27 10 - 44 U/L    Anion Gap 13 8 - 16 mmol/L    eGFR if African American >60 >60 mL/min/1.73 m^2    eGFR if non African American >60 >60 mL/min/1.73 m^2   Lactic acid, plasma #1   Result Value Ref Range    Lactate (Lactic Acid) 1.6 0.5 - 2.2 mmol/L   Urinalysis, Reflex to Urine Culture Urine, Clean Catch   Result Value Ref Range    Specimen UA Urine, Unspecified     Color, UA Yellow Yellow, Straw, Ashley    Appearance, UA Clear Clear    pH, UA 7.0 5.0 - 8.0    Specific Gravity, UA <=1.005 (A) 1.005 - 1.030    Protein, UA Negative Negative    Glucose, UA Negative Negative    Ketones, UA Negative Negative    Bilirubin (UA) Negative Negative    Occult Blood UA Negative Negative    Nitrite, UA Negative Negative    Urobilinogen, UA 2.0-3.0 (A) <2.0 EU/dL    Leukocytes, UA Negative Negative   Lactic acid, plasma #2   Result Value Ref Range    Lactate (Lactic Acid) 1.4 0.5 - 2.2 mmol/L     Significant Labs: All pertinent labs within the past 24 hours have been reviewed.  Imaging Results          CT Ankle (Including Hindfoot) With Contrast Left (Final result)  Result time 02/03/20 17:50:24    Final result by Griffin AVILA  MD Jane (02/03/20 17:50:24)                 Impression:      5 x 2.1 cm peripherally enhancing fluid collection seen adjacent to the medial malleolus consistent with abscess.  Moderate cellulitis.    All CT scans at this facility use dose modulation, iterative reconstruction, and/or weight base dosing when appropriate to reduce radiation dose to as low as reasonably achievable.      Electronically signed by: Griffin Lara MD  Date:    02/03/2020  Time:    17:50             Narrative:    EXAMINATION:  CT ANKLE (INCLUDING HINDFOOT) WITH CONTRAST LEFT    CLINICAL HISTORY:  abscess;    TECHNIQUE:  1.25 mm axial contrast CT images were obtained through the left ankle using soft tissue and bony algorithm after administration of 75 cc of Omni 350 con.  Sagittal coronal reformats were also submitted for interpretation.    COMPARISON:  02/02/2020    FINDINGS:  5 x 2.1 cm peripherally enhancing fluid collection seen adjacent to the medial malleolus consistent with abscess.  Trace joint effusion.  No acute fracture or dislocation.  Diffuse osteopenia.  Moderate degenerative changes of the ankle joint.    Diffuse soft tissue swelling consistent with cellulitis.  Sclerotic focus at the epiphysis of the tibia and within the talus thought to reflect bone islands.  Vascular structures appear patent.                               X-Ray Ankle Complete Left (Final result)  Result time 02/03/20 14:37:58    Final result by Farzad Jasmine MD (02/03/20 14:37:58)                 Impression:      As above.      Electronically signed by: Farzad Jasmine  Date:    02/03/2020  Time:    14:37             Narrative:    EXAMINATION:  XR ANKLE COMPLETE 3 VIEW LEFT    CLINICAL HISTORY:  Cellulitis of left lower limb    TECHNIQUE:  AP, lateral and oblique views of the left ankle were performed.    COMPARISON:  None    FINDINGS:  Soft tissue swelling medial ankle.  No fracture or dislocation.  Joint spaces appear well maintained.                                X-Ray Chest AP Portable (Final result)  Result time 02/03/20 14:36:47    Final result by Farzad Jasmine MD (02/03/20 14:36:47)                 Impression:      No acute abnormality.      Electronically signed by: Farzad Jasmine  Date:    02/03/2020  Time:    14:36             Narrative:    EXAMINATION:  XR CHEST AP PORTABLE    CLINICAL HISTORY:  Sepsis;.    TECHNIQUE:  Single frontal portable view of the chest was performed.    COMPARISON:  None    FINDINGS:  Support devices: None    The lungs are clear, with normal appearance of pulmonary vasculature and no pleural effusion or pneumothorax.    The cardiac silhouette is normal in size. The hilar and mediastinal contours are unremarkable.    Bones are intact.                              Significant Imaging: I have reviewed all pertinent imaging results/findings within the past 24 hours.

## 2020-02-04 NOTE — HPI
History of Present Illness: David Mohr is a 24 y.o. male patient with a PMHx of cerebral palsy who presented to the ER today redness, swelling, and tenderness to L ankle x 1 week. Pt states that he chronic weakness of his R leg and he hops on his L leg to ambulate. Associated sxs include left ankle redness, left ankle swelling, left ankle warmth, and left ankle pain. Patient denies any fever chills, HA, dizziness, N/V/D, and all other sxs at this time. No prior Tx reported. No hx of fall on injury or insect bite. No further complaints or concerns at this time.   Subsequently he was diagnosed with L ankle Abscess and underwent I&D in the ER and reportedly a large amount of purulent material was drained in the ER after which wound was packed and dressing applied and pt was admitted. He was started on Clindamycin IVPB in the ER and given DV Dilaudid for pain control.

## 2020-02-04 NOTE — H&P
[facility] Hospital Medicine  History & Physical    Patient Name: David Mhor  MRN: [id number]  Admission Date: 2/3/2020  Attending Physician: Jl Koehler MD   Primary Care Provider: María Wade MD         Patient information was obtained from patient, parent and ER records.     Subjective:     Principal Problem:<principal problem not specified>    Chief Complaint:   Chief Complaint   Patient presents with    Abscess     L ankle x 1 week; redness, swelling, and tenderness to L ankle         HPI: No notes on file    Past Medical History:   Diagnosis Date    Bipolar 1 disorder     Cerebral palsy        Past Surgical History:   Procedure Laterality Date    ABSCESS DRAINAGE      ham string      medication pump      baclofen pump removal       Review of patient's allergies indicates:   Allergen Reactions    Aspirin Anaphylaxis       No current facility-administered medications on file prior to encounter.      Current Outpatient Medications on File Prior to Encounter   Medication Sig    baclofen (LIORESAL) 20 MG tablet Take 20 mg by mouth 4 (four) times daily.    divalproex (DEPAKOTE) 250 MG EC tablet Take 250 mg by mouth.    docusate sodium (COLACE) 100 MG capsule Take 100 mg by mouth.    gabapentin (NEURONTIN) 300 MG capsule Take 300 mg by mouth 3 (three) times daily.     Family History     None        Tobacco Use    Smoking status: Current Some Day Smoker    Smokeless tobacco: Current User     Types: Snuff   Substance and Sexual Activity    Alcohol use: Yes     Comment: socially    Drug use: Yes     Types: Marijuana    Sexual activity: Not on file     Review of Systems   Constitutional: Positive for activity change and appetite change. Negative for chills, diaphoresis, fatigue and fever.   HENT: Negative.    Eyes: Negative.    Respiratory: Negative.    Cardiovascular: Negative.    Gastrointestinal: Negative.    Endocrine: Negative.    Genitourinary: Negative.    Musculoskeletal:  Positive for gait problem.   Skin: Positive for wound (abscess L Ankle).   Neurological: Positive for weakness.   Hematological: Negative.    Psychiatric/Behavioral: Negative.      Objective:     Vital Signs (Most Recent):  Temp: 98.8 °F (37.1 °C) (02/03/20 2330)  Pulse: 101 (02/03/20 2330)  Resp: 20 (02/03/20 2330)  BP: (!) 115/55 (02/03/20 2330)  SpO2: (!) 94 % (02/03/20 2330) Vital Signs (24h Range):  Temp:  [98.1 °F (36.7 °C)-98.8 °F (37.1 °C)] 98.8 °F (37.1 °C)  Pulse:  [] 101  Resp:  [13-20] 20  SpO2:  [94 %-100 %] 94 %  BP: (115-142)/(55-79) 115/55     Weight: 81.6 kg (179 lb 14.3 oz)  Body mass index is 27.35 kg/m².    Physical Exam   Constitutional: He is oriented to person, place, and time. He appears well-developed and well-nourished. No distress.   Adult male with mild Cerebral Palsy lying in bed comfortably, AAOx3   HENT:   Head: Normocephalic and atraumatic.   Mouth/Throat: Oropharynx is clear and moist.   Eyes: Pupils are equal, round, and reactive to light. Conjunctivae and EOM are normal.   Neck: Normal range of motion. Neck supple. No JVD present.   Cardiovascular: Normal rate, regular rhythm, normal heart sounds and intact distal pulses.   Pulmonary/Chest: Effort normal and breath sounds normal.   Abdominal: Soft. Bowel sounds are normal.   Genitourinary:   Genitourinary Comments: deferred   Musculoskeletal: He exhibits edema, tenderness (L ankle) and deformity.   Neurological: He is alert and oriented to person, place, and time.   Skin: Skin is warm and dry. He is not diaphoretic.   Psychiatric: He has a normal mood and affect. His behavior is normal. Thought content normal.   Nursing note and vitals reviewed.        CRANIAL NERVES     CN III, IV, VI   Pupils are equal, round, and reactive to light.  Extraocular motions are normal.     Results for orders placed or performed during the hospital encounter of 02/03/20   HIV 1/2 Ag/Ab (4th Gen)   Result Value Ref Range    HIV 1/2 Ag/Ab Negative  Negative   CBC auto differential   Result Value Ref Range    WBC 13.84 (H) 3.90 - 12.70 K/uL    RBC 5.21 4.60 - 6.20 M/uL    Hemoglobin 15.5 14.0 - 18.0 g/dL    Hematocrit 46.4 40.0 - 54.0 %    Mean Corpuscular Volume 89 82 - 98 fL    Mean Corpuscular Hemoglobin 29.8 27.0 - 31.0 pg    Mean Corpuscular Hemoglobin Conc 33.4 32.0 - 36.0 g/dL    RDW 11.9 11.5 - 14.5 %    Platelets 339 150 - 350 K/uL    MPV 10.4 9.2 - 12.9 fL    Immature Granulocytes 0.5 0.0 - 0.5 %    Gran # (ANC) 11.4 (H) 1.8 - 7.7 K/uL    Immature Grans (Abs) 0.07 (H) 0.00 - 0.04 K/uL    Lymph # 1.3 1.0 - 4.8 K/uL    Mono # 1.0 0.3 - 1.0 K/uL    Eos # 0.0 0.0 - 0.5 K/uL    Baso # 0.04 0.00 - 0.20 K/uL    nRBC 0 0 /100 WBC    Gran% 82.4 (H) 38.0 - 73.0 %    Lymph% 9.5 (L) 18.0 - 48.0 %    Mono% 7.1 4.0 - 15.0 %    Eosinophil% 0.2 0.0 - 8.0 %    Basophil% 0.3 0.0 - 1.9 %    Differential Method Automated    Comprehensive metabolic panel   Result Value Ref Range    Sodium 138 136 - 145 mmol/L    Potassium 4.1 3.5 - 5.1 mmol/L    Chloride 100 95 - 110 mmol/L    CO2 25 23 - 29 mmol/L    Glucose 104 70 - 110 mg/dL    BUN, Bld 7 6 - 20 mg/dL    Creatinine 0.8 0.5 - 1.4 mg/dL    Calcium 10.1 8.7 - 10.5 mg/dL    Total Protein 8.5 (H) 6.0 - 8.4 g/dL    Albumin 3.8 3.5 - 5.2 g/dL    Total Bilirubin 0.6 0.1 - 1.0 mg/dL    Alkaline Phosphatase 82 55 - 135 U/L    AST 22 10 - 40 U/L    ALT 27 10 - 44 U/L    Anion Gap 13 8 - 16 mmol/L    eGFR if African American >60 >60 mL/min/1.73 m^2    eGFR if non African American >60 >60 mL/min/1.73 m^2   Lactic acid, plasma #1   Result Value Ref Range    Lactate (Lactic Acid) 1.6 0.5 - 2.2 mmol/L   Urinalysis, Reflex to Urine Culture Urine, Clean Catch   Result Value Ref Range    Specimen UA Urine, Unspecified     Color, UA Yellow Yellow, Straw, Ashley    Appearance, UA Clear Clear    pH, UA 7.0 5.0 - 8.0    Specific Gravity, UA <=1.005 (A) 1.005 - 1.030    Protein, UA Negative Negative    Glucose, UA Negative Negative    Ketones,  UA Negative Negative    Bilirubin (UA) Negative Negative    Occult Blood UA Negative Negative    Nitrite, UA Negative Negative    Urobilinogen, UA 2.0-3.0 (A) <2.0 EU/dL    Leukocytes, UA Negative Negative   Lactic acid, plasma #2   Result Value Ref Range    Lactate (Lactic Acid) 1.4 0.5 - 2.2 mmol/L     Significant Labs: All pertinent labs within the past 24 hours have been reviewed.  Imaging Results          CT Ankle (Including Hindfoot) With Contrast Left (Final result)  Result time 02/03/20 17:50:24    Final result by Griffin Lara MD (02/03/20 17:50:24)                 Impression:      5 x 2.1 cm peripherally enhancing fluid collection seen adjacent to the medial malleolus consistent with abscess.  Moderate cellulitis.    All CT scans at this facility use dose modulation, iterative reconstruction, and/or weight base dosing when appropriate to reduce radiation dose to as low as reasonably achievable.      Electronically signed by: Griffin Lara MD  Date:    02/03/2020  Time:    17:50             Narrative:    EXAMINATION:  CT ANKLE (INCLUDING HINDFOOT) WITH CONTRAST LEFT    CLINICAL HISTORY:  abscess;    TECHNIQUE:  1.25 mm axial contrast CT images were obtained through the left ankle using soft tissue and bony algorithm after administration of 75 cc of Omni 350 con.  Sagittal coronal reformats were also submitted for interpretation.    COMPARISON:  02/02/2020    FINDINGS:  5 x 2.1 cm peripherally enhancing fluid collection seen adjacent to the medial malleolus consistent with abscess.  Trace joint effusion.  No acute fracture or dislocation.  Diffuse osteopenia.  Moderate degenerative changes of the ankle joint.    Diffuse soft tissue swelling consistent with cellulitis.  Sclerotic focus at the epiphysis of the tibia and within the talus thought to reflect bone islands.  Vascular structures appear patent.                               X-Ray Ankle Complete Left (Final result)  Result time 02/03/20 14:37:58     Final result by Farzad Jasmine MD (02/03/20 14:37:58)                 Impression:      As above.      Electronically signed by: Farzad Jasmine  Date:    02/03/2020  Time:    14:37             Narrative:    EXAMINATION:  XR ANKLE COMPLETE 3 VIEW LEFT    CLINICAL HISTORY:  Cellulitis of left lower limb    TECHNIQUE:  AP, lateral and oblique views of the left ankle were performed.    COMPARISON:  None    FINDINGS:  Soft tissue swelling medial ankle.  No fracture or dislocation.  Joint spaces appear well maintained.                               X-Ray Chest AP Portable (Final result)  Result time 02/03/20 14:36:47    Final result by Farzad Jasmine MD (02/03/20 14:36:47)                 Impression:      No acute abnormality.      Electronically signed by: Farzad Jasmine  Date:    02/03/2020  Time:    14:36             Narrative:    EXAMINATION:  XR CHEST AP PORTABLE    CLINICAL HISTORY:  Sepsis;.    TECHNIQUE:  Single frontal portable view of the chest was performed.    COMPARISON:  None    FINDINGS:  Support devices: None    The lungs are clear, with normal appearance of pulmonary vasculature and no pleural effusion or pneumothorax.    The cardiac silhouette is normal in size. The hilar and mediastinal contours are unremarkable.    Bones are intact.                              Significant Imaging: I have reviewed all pertinent imaging results/findings within the past 24 hours.    Assessment/Plan:     Abscess L Ankle with Cellulitis of left lower extremity s/p I&D  Large abscess L Ankle medially s/p I&D  Continue IV Clindamycin  Wound care consult  Podiatry consult- may need further exploration  Control pain with IV Dilaudid/ Percocet prn        VTE Risk Mitigation (From admission, onward)    None             Jl Koehler MD  Department of Hospital Medicine   [facility]

## 2020-02-04 NOTE — CONSULTS
CM has been screened for Medicaid by [facility].  Parents indicate that application was already completed and is on file.  [facility] to confirm application.

## 2020-02-04 NOTE — CONSULTS
Consulted on this 23 y/o M patient s/p I&D of left medial ankle abscess. Chart reviewed and I noted that patient was seen by Dr. Bermudez earlier this am for podiatry consult. Dressing changed by Dr. Bermudez. I discussed with primary nurse. Since dressing has been changed this am, I defer care of wound to podiatry service and will sign off.

## 2020-02-04 NOTE — PLAN OF CARE
Fall precautions maintained. Pt free from injuries/fall.  Repositions and ambulates with assist x1.  C/o pain, muscle spasms, PRN meds available.  Bed locked and low, side rails up x 2, phone and call light w/in reach.   POC and meds discussed, pt verbalized understanding.   Chart check done. Will cont to monitor.

## 2020-02-04 NOTE — PROGRESS NOTES
[facility] Hospital Medicine  Progress Note    Patient Name: David Mohr  MRN: [id number]  Patient Class: IP- Inpatient   Admission Date: 2/3/2020  Length of Stay: 1 days  Attending Physician: Jl Koehler MD  Primary Care Provider: María Wade MD        Subjective:     Principal Problem:Cellulitis of left lower extremity        HPI:  History of Present Illness: David Mohr is a 24 y.o. male patient with a PMHx of cerebral palsy who presented to the ER today redness, swelling, and tenderness to L ankle x 1 week. Pt states that he chronic weakness of his R leg and he hops on his L leg to ambulate. Associated sxs include left ankle redness, left ankle swelling, left ankle warmth, and left ankle pain. Patient denies any fever chills, HA, dizziness, N/V/D, and all other sxs at this time. No prior Tx reported. No hx of fall on injury or insect bite. No further complaints or concerns at this time.   Subsequently he was diagnosed with L ankle Abscess and underwent I&D in the ER and reportedly a large amount of purulent material was drained in the ER after which wound was packed and dressing applied and pt was admitted. He was started on Clindamycin IVPB in the ER and given DV Dilaudid for pain control.         Overview/Hospital Course:  Mr Mohr is a 24 year old male with PMHx of Cerebral palsy who presented to [facility] with ankle abscess, which was I & D in the Emergency Room. He was started IV antibiotic and Podiatry was consulted, recommend continuing IV antibiotic and upon D/C, follow up at 7-10 days. D/C with PO ABx.                       Interval History: Pt seen and examined, no distress, vital signs and labs stable will monitor.     Review of Systems   Constitutional: Negative for chills, diaphoresis, fatigue and fever.   HENT: Negative for congestion, ear discharge, rhinorrhea, sinus pressure, sore throat and trouble swallowing.    Eyes: Negative for discharge and visual disturbance.    Respiratory: Negative for apnea, cough, choking, chest tightness, shortness of breath, wheezing and stridor.    Cardiovascular: Negative for chest pain, palpitations and leg swelling.   Gastrointestinal: Negative for abdominal distention, abdominal pain, diarrhea, nausea and vomiting.   Endocrine: Negative for cold intolerance and heat intolerance.   Genitourinary: Negative for dysuria, frequency and hematuria.   Musculoskeletal: Negative for arthralgias, back pain, myalgias and neck pain.        Left Ankle pain    Skin: Negative for pallor and rash.   Neurological: Negative for dizziness, seizures, syncope, weakness and headaches.   Psychiatric/Behavioral: Negative for agitation, confusion and sleep disturbance.     Objective:     Vital Signs (Most Recent):  Temp: 98.3 °F (36.8 °C) (02/04/20 1648)  Pulse: 97 (02/04/20 1648)  Resp: 16 (02/04/20 1648)  BP: (!) 128/58 (02/04/20 1648)  SpO2: 97 % (02/04/20 1648) Vital Signs (24h Range):  Temp:  [97.5 °F (36.4 °C)-98.8 °F (37.1 °C)] 98.3 °F (36.8 °C)  Pulse:  [] 97  Resp:  [13-20] 16  SpO2:  [94 %-100 %] 97 %  BP: (107-142)/(55-75) 128/58     Weight: 81.6 kg (179 lb 14.3 oz)  Body mass index is 27.35 kg/m².    Intake/Output Summary (Last 24 hours) at 2/4/2020 1710  Last data filed at 2/4/2020 1400  Gross per 24 hour   Intake 770 ml   Output 3150 ml   Net -2380 ml      Physical Exam   Constitutional: He is oriented to person, place, and time. No distress.   Eyes: Right eye exhibits no discharge. Left eye exhibits no discharge.   Cardiovascular: Exam reveals no gallop and no friction rub.   No murmur heard.  Pulmonary/Chest: No stridor. No respiratory distress. He has no wheezes. He has no rales. He exhibits no tenderness.   Abdominal: He exhibits no distension and no mass. There is no tenderness. There is no rebound and no guarding. No hernia.   Musculoskeletal: He exhibits no edema or deformity.   Left ankle dressing intact    Neurological: He is alert and  oriented to person, place, and time.   Skin: Skin is warm and dry. Capillary refill takes less than 2 seconds. He is not diaphoretic.   Nursing note and vitals reviewed.      Significant Labs:   CBC:   Recent Labs   Lab 02/03/20  1449 02/04/20 0417   WBC 13.84* 9.47   HGB 15.5 12.3*   HCT 46.4 38.2*    292     CMP:   Recent Labs   Lab 02/03/20  1449 02/04/20 0417    140   K 4.1 4.1    106   CO2 25 25    90   BUN 7 9   CREATININE 0.8 0.8   CALCIUM 10.1 8.8   PROT 8.5*  --    ALBUMIN 3.8  --    BILITOT 0.6  --    ALKPHOS 82  --    AST 22  --    ALT 27  --    ANIONGAP 13 9   EGFRNONAA >60 >60       Significant Imaging:     Imaging Results          CT Ankle (Including Hindfoot) With Contrast Left (Final result)  Result time 02/03/20 17:50:24    Final result by Griffin Lara MD (02/03/20 17:50:24)                 Impression:      5 x 2.1 cm peripherally enhancing fluid collection seen adjacent to the medial malleolus consistent with abscess.  Moderate cellulitis.    All CT scans at this facility use dose modulation, iterative reconstruction, and/or weight base dosing when appropriate to reduce radiation dose to as low as reasonably achievable.      Electronically signed by: Griffin Lara MD  Date:    02/03/2020  Time:    17:50             Narrative:    EXAMINATION:  CT ANKLE (INCLUDING HINDFOOT) WITH CONTRAST LEFT    CLINICAL HISTORY:  abscess;    TECHNIQUE:  1.25 mm axial contrast CT images were obtained through the left ankle using soft tissue and bony algorithm after administration of 75 cc of Omni 350 con.  Sagittal coronal reformats were also submitted for interpretation.    COMPARISON:  02/02/2020    FINDINGS:  5 x 2.1 cm peripherally enhancing fluid collection seen adjacent to the medial malleolus consistent with abscess.  Trace joint effusion.  No acute fracture or dislocation.  Diffuse osteopenia.  Moderate degenerative changes of the ankle joint.    Diffuse soft tissue swelling  consistent with cellulitis.  Sclerotic focus at the epiphysis of the tibia and within the talus thought to reflect bone islands.  Vascular structures appear patent.                               X-Ray Ankle Complete Left (Final result)  Result time 02/03/20 14:37:58    Final result by Farzad Jasmine MD (02/03/20 14:37:58)                 Impression:      As above.      Electronically signed by: Farzad Jasmine  Date:    02/03/2020  Time:    14:37             Narrative:    EXAMINATION:  XR ANKLE COMPLETE 3 VIEW LEFT    CLINICAL HISTORY:  Cellulitis of left lower limb    TECHNIQUE:  AP, lateral and oblique views of the left ankle were performed.    COMPARISON:  None    FINDINGS:  Soft tissue swelling medial ankle.  No fracture or dislocation.  Joint spaces appear well maintained.                               X-Ray Chest AP Portable (Final result)  Result time 02/03/20 14:36:47    Final result by Farzad Jasmine MD (02/03/20 14:36:47)                 Impression:      No acute abnormality.      Electronically signed by: Farzad Jasmine  Date:    02/03/2020  Time:    14:36             Narrative:    EXAMINATION:  XR CHEST AP PORTABLE    CLINICAL HISTORY:  Sepsis;.    TECHNIQUE:  Single frontal portable view of the chest was performed.    COMPARISON:  None    FINDINGS:  Support devices: None    The lungs are clear, with normal appearance of pulmonary vasculature and no pleural effusion or pneumothorax.    The cardiac silhouette is normal in size. The hilar and mediastinal contours are unremarkable.    Bones are intact.                                Assessment/Plan:      * Abscess L Ankle with Cellulitis of left lower extremity s/p I&D  Large abscess L Ankle medially s/p I&D  Continue IV Clindamycin  Wound care consult  Podiatry consult- may need further exploration  Control pain with IV Dilaudid/ Percocet prn    2/4  Continue Clindamycin IV   Podiatry following, Upon D/C, follow up at 7-10 days. D/C with PO ABx      Tobacco  use  Smoking cessation   Nicotine patch       Other cerebral palsy  Continue home medications         VTE Risk Mitigation (From admission, onward)    None                Zeferino Ferris NP  Department of Hospital Medicine   [facility]

## 2020-02-04 NOTE — CONSULTS
[facility]   Podiatry  Consult Note    Patient Name: David Mohr  MRN: [id number]  Admission Date: 2/3/2020  Hospital Length of Stay: 1 days  Attending Physician: Jl Koehler MD  Primary Care Provider: María Wade MD     Inpatient consult to Podiatry  Consult performed by: Darshan Bermudez DPM  Consult ordered by: Jl Koehler MD        Subjective:     History of Present Illness: 24M, w/ a PMHx of CP and Bipolar Disorder, admitted concerning for abscess about the medial border of the left ankle joint. Patient states duration of the abscess was approximately 3 days prior to admission.  Patient does state, that over the past 2 so weeks, he has had increased pain to the area, but that the area became very red, swollen, hot and painful since Saturday night.  Patient was recently [facility] from a psychiatric [facility].  Patient presented to the emergency room yesterday were incision and drainage was performed, status post CT scan.  Patient states tolerable pains at approximately 4/10 at this time.    Scheduled Meds:   baclofen  20 mg Oral QID    clindamycin (CLEOCIN) IVPB  600 mg Intravenous Q8H    divalproex  250 mg Oral Q12H    docusate sodium  100 mg Oral Daily    gabapentin  300 mg Oral TID    nicotine  1 patch Transdermal Daily     Continuous Infusions:  PRN Meds:HYDROcodone-acetaminophen    Review of patient's allergies indicates:   Allergen Reactions    Aspirin Anaphylaxis        Past Medical History:   Diagnosis Date    Bipolar 1 disorder     Cerebral palsy      Past Surgical History:   Procedure Laterality Date    ABSCESS DRAINAGE      ham string      medication pump      baclofen pump removal       Family History     None        Tobacco Use    Smoking status: Current Some Day Smoker    Smokeless tobacco: Current User     Types: Snuff   Substance and Sexual Activity    Alcohol use: Yes     Comment: socially    Drug use: Yes     Types: Marijuana    Sexual activity: Not on  file     Review of Systems   Constitutional: Negative for chills, fatigue and fever.   HENT: Negative for hearing loss.    Eyes: Negative for photophobia and visual disturbance.   Respiratory: Negative for cough, chest tightness, shortness of breath and wheezing.    Cardiovascular: Negative for chest pain and palpitations.   Gastrointestinal: Negative for constipation, diarrhea, nausea and vomiting.   Endocrine: Negative for cold intolerance and heat intolerance.   Genitourinary: Negative for flank pain.   Musculoskeletal: Positive for arthralgias, back pain, gait problem, joint swelling and myalgias. Negative for neck pain and neck stiffness.   Skin: Positive for color change and wound.   Neurological: Negative for light-headedness and headaches.   Psychiatric/Behavioral: Negative for sleep disturbance.     Objective:     Vital Signs (Most Recent):  Temp: 98.5 °F (36.9 °C) (02/04/20 0747)  Pulse: 91 (02/04/20 0747)  Resp: 18 (02/04/20 0747)  BP: (!) 107/58 (02/04/20 0747)  SpO2: 98 % (02/04/20 0747) Vital Signs (24h Range):  Temp:  [97.5 °F (36.4 °C)-98.8 °F (37.1 °C)] 98.5 °F (36.9 °C)  Pulse:  [] 91  Resp:  [13-20] 18  SpO2:  [94 %-100 %] 98 %  BP: (107-142)/(55-75) 107/58     Weight: 81.6 kg (179 lb 14.3 oz)  Body mass index is 27.35 kg/m².    Foot Exam    Laboratory:  All pertinent labs reviewed within the last 24 hours.    Diagnostic Results:  I have reviewed all pertinent imaging results/findings within the past 24 hours.    Clinical Findings:  The dorsalis pedis and posterior tibial pulse on left lower extremity are 2/4.  Discomfort to palpation, moderate to severe in the area of abscess I&D.  Improving erythema calor is noted as compared to the images that I have reviewed.  No fluctuance noted. The wound at the medial border of the right ankle joint does probe down to bone, though there is no osseous exposure noted. Upon palpation compression and milking, no further purulence or abscess formation is  noted. No malodor is noted.    Assessment/Plan:     Active Diagnoses:    Diagnosis Date Noted POA    Other cerebral palsy [G80.8] 02/03/2020 Yes    Abscess L Ankle with Cellulitis of left lower extremity s/p I&D [L03.116] 02/03/2020 Yes      Problems Resolved During this Admission:       Patient status post incision drainage of abscess in the ED on yesterday (by ED MD/DO/NP/PA).  No further evidence of fluid collection. CT reviewed. Will monitor going forward. Patient will continue local wound care daily flush and pack, while in house.  Upon discharge, patient may continue similar x3 days weekly with home health. Off-loading. IV ABx. Upon D/C, follow up at 7-10 days. D/C with PO ABx.     Thank you for your consult. I will follow-up with patient. Please contact us if you have any additional questions.    Darshan Bermudez DPM  Podiatry  [facility]

## 2020-02-05 VITALS
TEMPERATURE: 98 F | HEIGHT: 68 IN | RESPIRATION RATE: 15 BRPM | SYSTOLIC BLOOD PRESSURE: 119 MMHG | BODY MASS INDEX: 27.26 KG/M2 | DIASTOLIC BLOOD PRESSURE: 71 MMHG | HEART RATE: 89 BPM | WEIGHT: 179.88 LBS | OXYGEN SATURATION: 99 %

## 2020-02-05 PROBLEM — L03.116 CELLULITIS OF LEFT LOWER EXTREMITY: Status: RESOLVED | Noted: 2020-02-03 | Resolved: 2020-02-05

## 2020-02-05 PROCEDURE — 99222 1ST HOSP IP/OBS MODERATE 55: CPT | Mod: ,,, | Performed by: PODIATRIST

## 2020-02-05 PROCEDURE — 25000003 PHARM REV CODE 250: Performed by: FAMILY MEDICINE

## 2020-02-05 PROCEDURE — 99222 PR INITIAL HOSPITAL CARE,LEVL II: ICD-10-PCS | Mod: ,,, | Performed by: PODIATRIST

## 2020-02-05 PROCEDURE — 25000003 PHARM REV CODE 250: Performed by: EMERGENCY MEDICINE

## 2020-02-05 PROCEDURE — 25000003 PHARM REV CODE 250: Performed by: NURSE PRACTITIONER

## 2020-02-05 PROCEDURE — S0077 INJECTION, CLINDAMYCIN PHOSP: HCPCS | Performed by: EMERGENCY MEDICINE

## 2020-02-05 RX ORDER — HYDROCODONE BITARTRATE AND ACETAMINOPHEN 5; 325 MG/1; MG/1
1 TABLET ORAL EVERY 6 HOURS PRN
Qty: 10 TABLET | Refills: 0 | Status: SHIPPED | OUTPATIENT
Start: 2020-02-05 | End: 2020-05-20

## 2020-02-05 RX ORDER — SULFAMETHOXAZOLE AND TRIMETHOPRIM 800; 160 MG/1; MG/1
1 TABLET ORAL 2 TIMES DAILY
Qty: 20 TABLET | Refills: 0 | Status: SHIPPED | OUTPATIENT
Start: 2020-02-05 | End: 2020-02-15

## 2020-02-05 RX ADMIN — DOCUSATE SODIUM 100 MG: 100 CAPSULE, LIQUID FILLED ORAL at 09:02

## 2020-02-05 RX ADMIN — HYDROCODONE BITARTRATE AND ACETAMINOPHEN 1 TABLET: 5; 325 TABLET ORAL at 01:02

## 2020-02-05 RX ADMIN — HYDROCODONE BITARTRATE AND ACETAMINOPHEN 1 TABLET: 5; 325 TABLET ORAL at 09:02

## 2020-02-05 RX ADMIN — BACLOFEN 20 MG: 10 TABLET ORAL at 09:02

## 2020-02-05 RX ADMIN — GABAPENTIN 300 MG: 300 CAPSULE ORAL at 09:02

## 2020-02-05 RX ADMIN — CLINDAMYCIN IN 5 PERCENT DEXTROSE 600 MG: 12 INJECTION, SOLUTION INTRAVENOUS at 08:02

## 2020-02-05 NOTE — CONSULTS
CM met with patient in regards to a follow up appointment with a PCP.  Patient states that he has no insurance and does not qualify for Medicaid. CM discussed the local community clinics.  He is agreeable for CM to arrange an appointment.  [facility] appointment with VALERI Ruiz on February 26th at 10:00 am

## 2020-02-05 NOTE — PROGRESS NOTES
[facility]  Podiatry  Progress Note    Patient Name: David Mohr  MRN: [id number]  Admission Date: 2/3/2020  Hospital Length of Stay: 2 days  Attending Physician: Jl Koehler MD  Primary Care Provider: María Wade MD     Subjective:     Interval History:  Patient resting comfortably in bed this afternoon. Patient is s/p ED I&D of left medial ankle abscess approximately 2 days ago. States he is about to D/C. States tolerable pains.      Scheduled Meds:   baclofen  20 mg Oral QID    clindamycin (CLEOCIN) IVPB  600 mg Intravenous Q8H    divalproex  250 mg Oral Q12H    docusate sodium  100 mg Oral Daily    gabapentin  300 mg Oral TID    nicotine  1 patch Transdermal Daily     Continuous Infusions:  PRN Meds:HYDROcodone-acetaminophen    Review of Systems   Constitutional: Negative for chills, fatigue and fever.   HENT: Negative for hearing loss.    Eyes: Negative for photophobia and visual disturbance.   Respiratory: Negative for cough, chest tightness, shortness of breath and wheezing.    Cardiovascular: Positive for leg swelling. Negative for chest pain and palpitations.   Gastrointestinal: Negative for constipation, diarrhea, nausea and vomiting.   Endocrine: Negative for cold intolerance and heat intolerance.   Genitourinary: Negative for flank pain.   Musculoskeletal: Positive for joint swelling and myalgias. Negative for neck pain and neck stiffness.   Skin: Positive for color change and wound.   Neurological: Negative for light-headedness and headaches.   Psychiatric/Behavioral: Negative for sleep disturbance.     Objective:     Vital Signs (Most Recent):  Temp: 97.9 °F (36.6 °C) (02/05/20 0808)  Pulse: 89 (02/05/20 0808)  Resp: 15 (02/05/20 0808)  BP: 119/71 (02/05/20 0808)  SpO2: 99 % (02/05/20 0808) Vital Signs (24h Range):  Temp:  [97.3 °F (36.3 °C)-98.6 °F (37 °C)] 97.9 °F (36.6 °C)  Pulse:  [] 89  Resp:  [15-18] 15  SpO2:  [97 %-99 %] 99 %  BP: (108-144)/(56-90) 119/71      Weight: 81.6 kg (179 lb 14.3 oz)  Body mass index is 27.35 kg/m².    Foot Exam    Laboratory:  All pertinent labs reviewed within the last 24 hours.    Diagnostic Results:  I have reviewed all pertinent imaging results/findings within the past 24 hours.    Clinical Findings:  Palpable pulses to the LLE. Nearly resolved erythema is noted about the area. The wound at the I&D site is nearly healed and resolved. No malodor is noted. No fluctuance is noted. Improved edema is noted. Moderate pains to palpation of the area.    Assessment/Plan:     Active Diagnoses:    Diagnosis Date Noted POA    Tobacco use [Z72.0] 02/04/2020 Yes    Other cerebral palsy [G80.8] 02/03/2020 Yes      Problems Resolved During this Admission:    Diagnosis Date Noted Date Resolved POA    PRINCIPAL PROBLEM:  Abscess L Ankle with Cellulitis of left lower extremity s/p I&D [L03.116] 02/03/2020 02/05/2020 Yes       Improving clinicals s/p I&D. Resolving erythema and cellulitis and edema is noted. No malodor or fluctuance. Patient way continue QD dressings with wet-2-dry dressings or ABx ointment or nonadherent dressings. Ambulation as tolerated. F/U with me at 10 days D/C.     Darshan Bermudez DPM  Podiatry  [facility]

## 2020-02-05 NOTE — PLAN OF CARE
Pt noted resting quietly in bed at this time and remains free from falls this shift.   Pt able to make needs known to staff.   Pt c/o pain periodically throughout shift and medicated per mar.   Iv abts administered per mar. Drsg to left lower extremity intact at this time.   Poc reviewed and pt verbalized understanding.   Pt denies needs at this time. Safety intact. Bed in low position. Call light in reach sr up x2. Will cont to monitor and eval throughout shift.   Chart check complete.

## 2020-02-05 NOTE — DISCHARGE SUMMARY
[facility]  Hospital Medicine  Discharge Summary      Patient Name: David Mohr  MRN: [id number]  Admission Date: 2/3/2020  Hospital Length of Stay: 2 days  Discharge Date and Time: 2/5/2020 11:56 AM  Attending Physician: Jl Koehler MD   Discharging Provider: Zeferino Ferris NP  Primary Care Provider: María Wade MD      HPI:   History of Present Illness: David Mohr is a 24 y.o. male patient with a PMHx of cerebral palsy who presented to the ER today redness, swelling, and tenderness to L ankle x 1 week. Pt states that he chronic weakness of his R leg and he hops on his L leg to ambulate. Associated sxs include left ankle redness, left ankle swelling, left ankle warmth, and left ankle pain. Patient denies any fever chills, HA, dizziness, N/V/D, and all other sxs at this time. No prior Tx reported. No hx of fall on injury or insect bite. No further complaints or concerns at this time.   Subsequently he was diagnosed with L ankle Abscess and underwent I&D in the ER and reportedly a large amount of purulent material was drained in the ER after which wound was packed and dressing applied and pt was admitted. He was started on Clindamycin IVPB in the ER and given DV Dilaudid for pain control.         * No surgery found *      Hospital Course:   Mr Mohr is a 24 year old male with PMHx of Cerebral palsy who presented to [facility] with ankle abscess, which was I & D in the Emergency Room. He was started IV antibiotic and Podiatry was consulted, Podiatry recommend continuing IV antibiotic and upon D/C, follow up at 7-10 days. D/C with PO ABx. Today, patient is feeling well, vital signs stable. Patient does not have any insurance,  consult to arrange follow up with PCP. Orders given for dressing changes and to instruct patient on dressing changes as well. PO antibiotic prescribed and given to patient by [facility] before discharge. Patient was seen, examined and deemed suitable  for discharge.                       Consults:   Consults (From admission, onward)        Status Ordering Provider     Inpatient consult to Podiatry  Once     Provider:  Darshan Bermudez DPM    Completed ANGIE BURROWS     Inpatient consult to Social Work  Once     Provider:  (Not yet assigned)    Completed ANGIE BURROWS     Inpatient consult to Social Work  Once     Provider:  (Not yet assigned)    Completed RONNIE PRATT          No new Assessment & Plan notes have been filed under this hospital service since the last note was generated.  Service: Hospital Medicine    Final Active Diagnoses:    Diagnosis Date Noted POA    Tobacco use [Z72.0] 02/04/2020 Yes    Other cerebral palsy [G80.8] 02/03/2020 Yes      Problems Resolved During this Admission:    Diagnosis Date Noted Date Resolved POA    PRINCIPAL PROBLEM:  Abscess L Ankle with Cellulitis of left lower extremity s/p I&D [L03.116] 02/03/2020 02/05/2020 Yes       Discharged Condition: stable    Disposition: Home or Self Care    Follow Up:  Follow-up Information     Schedule an appointment as soon as possible for a visit with María Wade MD.    Specialty:  Family Medicine  Why:  hospital follow up   Contact information:  [address] [place] [id number]  [phone]             [facility]. Go on 2/26/2020.    Why:  at 10:00 am with VALERI Ruiz.  Bring ID and financial information.  You can also walk in at the clinic sooner if needed.  Contact information:  [address] [place] [address] [place]  [address]  [phone]               Patient Instructions:      Diet Adult Regular     Change dressing (specify)   Order Comments: Change dressing daily and as needed for increase drainage     Activity as tolerated       Significant Diagnostic Studies: Labs:   CMP   Recent Labs   Lab 02/04/20  0417      K 4.1      CO2 25   GLU 90   BUN 9   CREATININE 0.8   CALCIUM 8.8   ANIONGAP 9   ESTGFRAFRICA >60   EGFRNONAA >60    and CBC    Recent Labs   Lab 02/04/20  0417   WBC 9.47   HGB 12.3*   HCT 38.2*          Pending Diagnostic Studies:     None         Medications:  Reconciled Home Medications:      Medication List      START taking these medications    HYDROcodone-acetaminophen 5-325 mg per tablet  Commonly known as:  NORCO  Take 1 tablet by mouth every 6 (six) hours as needed for Pain.     sulfamethoxazole-trimethoprim 800-160mg 800-160 mg Tab  Commonly known as:  BACTRIM DS  Take 1 tablet by mouth 2 (two) times daily. for 10 days        CONTINUE taking these medications    baclofen 20 MG tablet  Commonly known as:  LIORESAL  Take 20 mg by mouth 4 (four) times daily.     divalproex 250 MG EC tablet  Commonly known as:  DEPAKOTE  Take 250 mg by mouth.     docusate sodium 100 MG capsule  Commonly known as:  COLACE  Take 100 mg by mouth.     gabapentin 300 MG capsule  Commonly known as:  NEURONTIN  Take 300 mg by mouth 3 (three) times daily.            Indwelling Lines/Drains at time of discharge:   Lines/Drains/Airways     None                 Time spent on the discharge of patient: 45 minutes  Patient was seen and examined on the date of discharge and determined to be suitable for discharge.         Zeferino Ferris NP  Department of Hospital Medicine  [facility]

## 2020-02-05 NOTE — PLAN OF CARE
Feb26 Go to [facility]   Wednesday Feb 26, 2020   at 10:00 am with VALERI Ruiz.  Bring ID and financial information.  You can also walk in at the clinic sooner if needed.  [address] [facility]   [place]  [id number]   [phone]        02/05/20 1400   Final Note   Assessment Type Final Discharge Note   Anticipated Discharge Disposition Home   Hospital Follow Up  Appt(s) scheduled? Yes   Right [facility] Referral Info   Post Acute Recommendation No Care

## 2020-02-05 NOTE — NURSING
Preparing to discharge pt, wound care dressing change done, supplied pt with extra supplies, calling retail pharm re bactrim rx    Giving pt his norco paper script, will d/c iv per policy    Vs wnl, no acute distress noted, pt has follow up appt at health clinic later this month, remains injury free, chart check complete

## 2020-02-08 LAB
BACTERIA BLD CULT: NORMAL
BACTERIA BLD CULT: NORMAL

## 2020-03-06 ENCOUNTER — HOSPITAL ENCOUNTER (EMERGENCY)
Facility: HOSPITAL | Age: 25
Discharge: HOME | End: 2020-03-06
Attending: EMERGENCY MEDICINE

## 2020-03-06 VITALS
DIASTOLIC BLOOD PRESSURE: 61 MMHG | RESPIRATION RATE: 18 BRPM | HEART RATE: 76 BPM | TEMPERATURE: 98 F | BODY MASS INDEX: 26.61 KG/M2 | SYSTOLIC BLOOD PRESSURE: 134 MMHG | OXYGEN SATURATION: 95 % | WEIGHT: 175 LBS

## 2020-03-06 DIAGNOSIS — M25.462 SWELLING OF KNEE JOINT, LEFT: ICD-10-CM

## 2020-03-06 DIAGNOSIS — L03.115 CELLULITIS OF RIGHT LEG: Primary | ICD-10-CM

## 2020-03-06 LAB
ALBUMIN SERPL BCP-MCNC: 4.1 G/DL (ref 3.5–5.2)
ALP SERPL-CCNC: 65 U/L (ref 55–135)
ALT SERPL W/O P-5'-P-CCNC: 19 U/L (ref 10–44)
ANION GAP SERPL CALC-SCNC: 11 MMOL/L (ref 8–16)
AST SERPL-CCNC: 17 U/L (ref 10–40)
BASOPHILS # BLD AUTO: 0.04 K/UL (ref 0–0.2)
BASOPHILS NFR BLD: 0.3 % (ref 0–1.9)
BILIRUB SERPL-MCNC: 0.4 MG/DL (ref 0.1–1)
BUN SERPL-MCNC: 15 MG/DL (ref 6–20)
CALCIUM SERPL-MCNC: 9.5 MG/DL (ref 8.7–10.5)
CHLORIDE SERPL-SCNC: 101 MMOL/L (ref 95–110)
CO2 SERPL-SCNC: 26 MMOL/L (ref 23–29)
CREAT SERPL-MCNC: 0.8 MG/DL (ref 0.5–1.4)
CRP SERPL-MCNC: 45.6 MG/L (ref 0–8.2)
DIFFERENTIAL METHOD: ABNORMAL
EOSINOPHIL # BLD AUTO: 0.3 K/UL (ref 0–0.5)
EOSINOPHIL NFR BLD: 2.6 % (ref 0–8)
ERYTHROCYTE [DISTWIDTH] IN BLOOD BY AUTOMATED COUNT: 12.6 % (ref 11.5–14.5)
ERYTHROCYTE [SEDIMENTATION RATE] IN BLOOD BY WESTERGREN METHOD: 30 MM/HR (ref 0–10)
EST. GFR  (AFRICAN AMERICAN): >60 ML/MIN/1.73 M^2
EST. GFR  (NON AFRICAN AMERICAN): >60 ML/MIN/1.73 M^2
GLUCOSE SERPL-MCNC: 94 MG/DL (ref 70–110)
HCT VFR BLD AUTO: 45.8 % (ref 40–54)
HGB BLD-MCNC: 15.1 G/DL (ref 14–18)
IMM GRANULOCYTES # BLD AUTO: 0.04 K/UL (ref 0–0.04)
IMM GRANULOCYTES NFR BLD AUTO: 0.3 % (ref 0–0.5)
LACTATE SERPL-SCNC: 1.8 MMOL/L (ref 0.5–2.2)
LYMPHOCYTES # BLD AUTO: 3.1 K/UL (ref 1–4.8)
LYMPHOCYTES NFR BLD: 25.5 % (ref 18–48)
MCH RBC QN AUTO: 29.5 PG (ref 27–31)
MCHC RBC AUTO-ENTMCNC: 33 G/DL (ref 32–36)
MCV RBC AUTO: 90 FL (ref 82–98)
MONOCYTES # BLD AUTO: 1.1 K/UL (ref 0.3–1)
MONOCYTES NFR BLD: 9 % (ref 4–15)
NEUTROPHILS # BLD AUTO: 7.4 K/UL (ref 1.8–7.7)
NEUTROPHILS NFR BLD: 62.3 % (ref 38–73)
NRBC BLD-RTO: 0 /100 WBC
PLATELET # BLD AUTO: 343 K/UL (ref 150–350)
PMV BLD AUTO: 10.6 FL (ref 9.2–12.9)
POTASSIUM SERPL-SCNC: 4.6 MMOL/L (ref 3.5–5.1)
PROT SERPL-MCNC: 8.1 G/DL (ref 6–8.4)
RBC # BLD AUTO: 5.12 M/UL (ref 4.6–6.2)
SODIUM SERPL-SCNC: 138 MMOL/L (ref 136–145)
WBC # BLD AUTO: 11.94 K/UL (ref 3.9–12.7)

## 2020-03-06 PROCEDURE — S0077 INJECTION, CLINDAMYCIN PHOSP: HCPCS | Performed by: EMERGENCY MEDICINE

## 2020-03-06 PROCEDURE — 96375 TX/PRO/DX INJ NEW DRUG ADDON: CPT | Mod: 59

## 2020-03-06 PROCEDURE — 85025 COMPLETE CBC W/AUTO DIFF WBC: CPT

## 2020-03-06 PROCEDURE — 63600175 PHARM REV CODE 636 W HCPCS: Performed by: EMERGENCY MEDICINE

## 2020-03-06 PROCEDURE — 25000003 PHARM REV CODE 250: Performed by: EMERGENCY MEDICINE

## 2020-03-06 PROCEDURE — 96361 HYDRATE IV INFUSION ADD-ON: CPT

## 2020-03-06 PROCEDURE — 87040 BLOOD CULTURE FOR BACTERIA: CPT

## 2020-03-06 PROCEDURE — 80053 COMPREHEN METABOLIC PANEL: CPT

## 2020-03-06 PROCEDURE — 85651 RBC SED RATE NONAUTOMATED: CPT

## 2020-03-06 PROCEDURE — 86140 C-REACTIVE PROTEIN: CPT

## 2020-03-06 PROCEDURE — 83605 ASSAY OF LACTIC ACID: CPT

## 2020-03-06 PROCEDURE — 63600175 PHARM REV CODE 636 W HCPCS: Performed by: REGISTERED NURSE

## 2020-03-06 PROCEDURE — 36415 COLL VENOUS BLD VENIPUNCTURE: CPT

## 2020-03-06 PROCEDURE — 99284 EMERGENCY DEPT VISIT MOD MDM: CPT | Mod: 25

## 2020-03-06 PROCEDURE — 96365 THER/PROPH/DIAG IV INF INIT: CPT

## 2020-03-06 RX ORDER — ONDANSETRON 2 MG/ML
4 INJECTION INTRAMUSCULAR; INTRAVENOUS
Status: COMPLETED | OUTPATIENT
Start: 2020-03-06 | End: 2020-03-06

## 2020-03-06 RX ORDER — CLINDAMYCIN HYDROCHLORIDE 150 MG/1
300 CAPSULE ORAL 4 TIMES DAILY
Qty: 56 CAPSULE | Refills: 0 | Status: SHIPPED | OUTPATIENT
Start: 2020-03-06 | End: 2020-03-13

## 2020-03-06 RX ORDER — HYDROCODONE BITARTRATE AND ACETAMINOPHEN 10; 325 MG/1; MG/1
1 TABLET ORAL EVERY 6 HOURS PRN
Qty: 9 TABLET | Refills: 0 | Status: SHIPPED | OUTPATIENT
Start: 2020-03-06 | End: 2020-05-20

## 2020-03-06 RX ORDER — MORPHINE SULFATE 4 MG/ML
4 INJECTION, SOLUTION INTRAMUSCULAR; INTRAVENOUS
Status: COMPLETED | OUTPATIENT
Start: 2020-03-06 | End: 2020-03-06

## 2020-03-06 RX ORDER — CLINDAMYCIN PHOSPHATE 900 MG/50ML
900 INJECTION, SOLUTION INTRAVENOUS
Status: COMPLETED | OUTPATIENT
Start: 2020-03-06 | End: 2020-03-06

## 2020-03-06 RX ADMIN — ONDANSETRON 4 MG: 2 INJECTION INTRAMUSCULAR; INTRAVENOUS at 07:03

## 2020-03-06 RX ADMIN — CLINDAMYCIN IN 5 PERCENT DEXTROSE 900 MG: 18 INJECTION, SOLUTION INTRAVENOUS at 07:03

## 2020-03-06 RX ADMIN — MORPHINE SULFATE 4 MG: 4 INJECTION, SOLUTION INTRAMUSCULAR; INTRAVENOUS at 07:03

## 2020-03-06 RX ADMIN — SODIUM CHLORIDE 2382 ML: 0.9 INJECTION, SOLUTION INTRAVENOUS at 06:03

## 2020-03-06 NOTE — ED PROVIDER NOTES
3/6/2020, 5:15 PM   History obtained from the patient      History of Present Illness: David Mohr is a 24 y.o. male patient who presents to the Emergency Department for L knee swelling and redness which onset gradually 2 days ago.    5:16 PM: Pt understands they will be seen and dispositioned by the next available physician. Pt voices understanding and agrees with plan. Pt also understands the longer than normal wait time. I am removing myself from the care of pt and pt will now be assigned to the next available physician.     DAMIAN Reyez FN-C    SCRIBE #1 NOTE: I, Petra Silva, am scribing for, and in the presence of, Jese Trevino Jr., MD. I have scribed the entire note.       History     Chief Complaint   Patient presents with    Abscess     c/o abscess to L knee, states he believes he was bit by a spider      Review of patient's allergies indicates:   Allergen Reactions    Aspirin Anaphylaxis         History of Present Illness     HPI    3/6/2020, 7:44 PM  History obtained from the patient      History of Present Illness: David Mohr is a 24 y.o. male patient with a PMHx of cerebral palsy who presents to the Emergency Department for evaluation of abscess to L knee which onset gradually. Symptoms are constant and moderate in severity. No mitigating or exacerbating factors reported. Associated sxs include drainage from the site. Patient denies any fever, chills, extremity weakness/numbness, leg swelling, dizziness, lightheadedness, n/v, and all other sxs at this time. No prior Tx. Patient reports recent I&D of abscess to L ankle 1 month ago. No further complaints or concerns at this time.       Arrival mode: Personal vehicle    PCP: María Wade MD        Past Medical History:  Past Medical History:   Diagnosis Date    Bipolar 1 disorder     Cerebral palsy        Past Surgical History:  Past Surgical History:   Procedure Laterality Date    ABSCESS DRAINAGE      ham string       medication pump      baclofen pump removal         Family History:  No family history.  Social History:  Social History     Tobacco Use    Smoking status: Current Some Day Smoker    Smokeless tobacco: Current User     Types: Snuff   Substance and Sexual Activity    Alcohol use: Yes     Comment: socially    Drug use: Yes     Types: Marijuana    Sexual activity: unknown        Review of Systems     Review of Systems   Constitutional: Negative for activity change, chills, diaphoresis and fever.   HENT: Negative for congestion, rhinorrhea, sneezing, sore throat and trouble swallowing.    Eyes: Negative for pain.   Respiratory: Negative for cough, chest tightness, shortness of breath, wheezing and stridor.    Cardiovascular: Negative for chest pain, palpitations and leg swelling.   Gastrointestinal: Negative for abdominal distention, abdominal pain, constipation, diarrhea, nausea and vomiting.   Genitourinary: Negative for difficulty urinating, dysuria, frequency and urgency.   Musculoskeletal: Negative for arthralgias, back pain, myalgias, neck pain and neck stiffness.   Skin: Positive for wound (L knee, drainage noted). Negative for pallor and rash.   Neurological: Negative for dizziness, syncope, weakness, light-headedness, numbness and headaches.   Hematological: Does not bruise/bleed easily.   All other systems reviewed and are negative.     Physical Exam     Initial Vitals [03/06/20 1641]   BP Pulse Resp Temp SpO2   128/62 97 18 97.7 °F (36.5 °C) 98 %      MAP       --          Physical Exam  Nursing Notes and Vital Signs Reviewed.  Constitutional: Patient is in no acute distress. Well-developed and well-nourished.  Head: Atraumatic. Normocephalic.  Eyes: . EOM intact. . No scleral icterus.  ENT: Mucous membranes are moist. Nares clear  Neck: Supple. Full ROM.   Cardiovascular: Regular rate. Regular rhythm. No murmurs, rubs, or gallops. Distal pulses are 2+ and symmetric.  Pulmonary/Chest: No respiratory  distress. No sensory muscle use.  Abdominal: Soft and non-distended.   No rebound, guarding, or rigidity.   Musculoskeletal: Moves all extremities. Baseline deformities of feet. No edema. No calf tenderness. Full active ROM of L knee.   Skin: Warm and dry. There is cellulitis to the lateral aspect of the left leg standing from the knee down the proximal aspect of the lateral calf.  There is no abscess.  There is a sinus that appears to have been focus of a abscess that has since drain but there is no fluctuance or crepitus.  The patient has full active range of motion of the right knee.  His baseline deformity of his fevers noted. Has no evidence of joint involvement.  Neurological:  Alert, awake, and appropriate.  Normal speech.  No acute focal neurological deficits are appreciated.  Psychiatric: Normal affect. Good eye contact. Appropriate in content.     ED Course   Procedures  ED Vital Signs:  Vitals:    03/06/20 1641 03/06/20 1941   BP: 128/62 (!) 142/70   Pulse: 97 96   Resp: 18    Temp: 97.7 °F (36.5 °C)    TempSrc: Oral    SpO2: 98% 99%   Weight: 79.4 kg (175 lb)        Abnormal Lab Results:  Labs Reviewed   CBC W/ AUTO DIFFERENTIAL - Abnormal; Notable for the following components:       Result Value    Mono # 1.1 (*)     All other components within normal limits   SEDIMENTATION RATE - Abnormal; Notable for the following components:    Sed Rate 30 (*)     All other components within normal limits   C-REACTIVE PROTEIN - Abnormal; Notable for the following components:    CRP 45.6 (*)     All other components within normal limits   CULTURE, BLOOD   CULTURE, BLOOD   COMPREHENSIVE METABOLIC PANEL   LACTIC ACID, PLASMA   LACTIC ACID, PLASMA        All Lab Results:  Results for orders placed or performed during the hospital encounter of 03/06/20   CBC auto differential   Result Value Ref Range    WBC 11.94 3.90 - 12.70 K/uL    RBC 5.12 4.60 - 6.20 M/uL    Hemoglobin 15.1 14.0 - 18.0 g/dL    Hematocrit 45.8 40.0 -  54.0 %    Mean Corpuscular Volume 90 82 - 98 fL    Mean Corpuscular Hemoglobin 29.5 27.0 - 31.0 pg    Mean Corpuscular Hemoglobin Conc 33.0 32.0 - 36.0 g/dL    RDW 12.6 11.5 - 14.5 %    Platelets 343 150 - 350 K/uL    MPV 10.6 9.2 - 12.9 fL    Immature Granulocytes 0.3 0.0 - 0.5 %    Gran # (ANC) 7.4 1.8 - 7.7 K/uL    Immature Grans (Abs) 0.04 0.00 - 0.04 K/uL    Lymph # 3.1 1.0 - 4.8 K/uL    Mono # 1.1 (H) 0.3 - 1.0 K/uL    Eos # 0.3 0.0 - 0.5 K/uL    Baso # 0.04 0.00 - 0.20 K/uL    nRBC 0 0 /100 WBC    Gran% 62.3 38.0 - 73.0 %    Lymph% 25.5 18.0 - 48.0 %    Mono% 9.0 4.0 - 15.0 %    Eosinophil% 2.6 0.0 - 8.0 %    Basophil% 0.3 0.0 - 1.9 %    Differential Method Automated    Comprehensive metabolic panel   Result Value Ref Range    Sodium 138 136 - 145 mmol/L    Potassium 4.6 3.5 - 5.1 mmol/L    Chloride 101 95 - 110 mmol/L    CO2 26 23 - 29 mmol/L    Glucose 94 70 - 110 mg/dL    BUN, Bld 15 6 - 20 mg/dL    Creatinine 0.8 0.5 - 1.4 mg/dL    Calcium 9.5 8.7 - 10.5 mg/dL    Total Protein 8.1 6.0 - 8.4 g/dL    Albumin 4.1 3.5 - 5.2 g/dL    Total Bilirubin 0.4 0.1 - 1.0 mg/dL    Alkaline Phosphatase 65 55 - 135 U/L    AST 17 10 - 40 U/L    ALT 19 10 - 44 U/L    Anion Gap 11 8 - 16 mmol/L    eGFR if African American >60 >60 mL/min/1.73 m^2    eGFR if non African American >60 >60 mL/min/1.73 m^2   Lactic acid, plasma #1   Result Value Ref Range    Lactate (Lactic Acid) 1.8 0.5 - 2.2 mmol/L   Sedimentation rate   Result Value Ref Range    Sed Rate 30 (H) 0 - 10 mm/Hr   C-reactive protein   Result Value Ref Range    CRP 45.6 (H) 0.0 - 8.2 mg/L         Imaging Results:  Imaging Results          X-Ray Knee Complete 4 or More Views Left (Final result)  Result time 03/06/20 17:50:42    Final result by Ellen Friend MD (Timothy) (03/06/20 17:50:42)                 Impression:      Negative exam.      Electronically signed by: Ellen Friend MD  Date:    03/06/2020  Time:    17:50             Narrative:    EXAMINATION:  XR  KNEE COMP 4 OR MORE VIEWS LEFT    CLINICAL HISTORY:  Effusion, left knee    TECHNIQUE:  Standard radiography performed.    COMPARISON:  None    FINDINGS:  Bone density and architecture are normal.  No acute findings.  No definite effusions.                                          The Emergency Provider reviewed the vital signs and test results, which are outlined above.     ED Discussion     7:46 PM: Discussed with pt all pertinent ED information and results. Discussed pt dx and plan of tx. Gave pt all f/u and return to the ED instructions. All questions and concerns were addressed at this time. Pt expresses understanding of information and instructions, and is comfortable with plan to discharge. Pt is stable for discharge.    I discussed with patient and/or family/caretaker that evaluation in the ED does not suggest any emergent or life threatening medical conditions requiring immediate intervention beyond what was provided in the ED, and I believe patient is safe for discharge.  Regardless, an unremarkable evaluation in the ED does not preclude the development or presence of a serious of life threatening condition. As such, patient was instructed to return immediately for any worsening or change in current symptoms.         Medical Decision Making:   Clinical Tests:   Lab Tests: Ordered and Reviewed  Radiological Study: Ordered and Reviewed           ED Medication(s):  Medications   ondansetron injection 4 mg (has no administration in time range)   morphine injection 4 mg (has no administration in time range)   clindamycin 900 MG/50 ML D5W 900 mg/50 mL IVPB 900 mg (has no administration in time range)   sodium chloride 0.9% bolus 2,382 mL (2,382 mLs Intravenous New Bag 3/6/20 8312)       New Prescriptions    CLINDAMYCIN (CLEOCIN) 150 MG CAPSULE    Take 2 capsules (300 mg total) by mouth 4 (four) times daily. for 7 days    HYDROCODONE-ACETAMINOPHEN (NORCO)  MG PER TABLET    Take 1 tablet by mouth every 6  (six) hours as needed.       Follow-up Information     María Wade MD In 2 days.    Specialty:  Family Medicine  Contact information:  [address]  [place] [address]  [phone]                  Follow-up Information     María Wade MD In 2 days.    Specialty:  Family Medicine  Contact information:  [address]  [place] [address]  [phone]                   Scribe Attestation:   Scribe #1: I performed the above scribed service and the documentation accurately describes the services I performed. I attest to the accuracy of the note.     Attending:   Physician Attestation Statement for Scribe #1: I, Jese Trevino Jr., MD, personally performed the services described in this documentation, as scribed by Petra Silva, in my presence, and it is both accurate and complete.           Clinical Impression       ICD-10-CM ICD-9-CM   1. Cellulitis of right leg L03.115 682.6   2. Swelling of knee joint, left M25.462 719.06       Disposition:   Disposition: Discharged  Condition: Stable         Jese Trevino Jr., MD  03/06/20 1952

## 2020-03-07 NOTE — DISCHARGE INSTRUCTIONS
You do not have an abscess clinically.  It appears that you had 1 that is drain now had subsequent skin infection.  Take clindamycin as prescribed for the infection, ibuprofen for pain, Norco for breakthrough pain. But warm compresses to the lesion 4 times daily.  Follow up with her doctor on Monday for re-evaluation.  Return as needed for any worsening symptoms, problems, questions or concerns.

## 2020-03-12 LAB
BACTERIA BLD CULT: NORMAL
BACTERIA BLD CULT: NORMAL

## 2020-05-12 ENCOUNTER — TELEPHONE (OUTPATIENT)
Dept: NEUROLOGY | Facility: CLINIC | Age: 25
End: 2020-05-12

## 2020-05-12 NOTE — TELEPHONE ENCOUNTER
----- Message from Mireya Tucker sent at 5/12/2020 12:12 PM CDT -----  Contact: Radha-narendra Garcia requesting a call back regarding pt. Pt will be coming in as a new pt and she is wanting to know how she would be able to get medication refills before the new appt. She states that the pt was being treated by another Neurologist and lost insurance. They now have a new insurance, and would like to be under the care of [facility]. Please call Radha back at [phone]

## 2020-05-14 ENCOUNTER — TELEPHONE (OUTPATIENT)
Dept: NEUROLOGY | Facility: CLINIC | Age: 25
End: 2020-05-14

## 2020-05-15 ENCOUNTER — OFFICE VISIT (OUTPATIENT)
Dept: FAMILY MEDICINE | Facility: CLINIC | Age: 25
End: 2020-05-15
Payer: COMMERCIAL

## 2020-05-15 ENCOUNTER — LAB VISIT (OUTPATIENT)
Dept: LAB | Facility: HOSPITAL | Age: 25
End: 2020-05-15
Attending: FAMILY MEDICINE
Payer: COMMERCIAL

## 2020-05-15 VITALS
TEMPERATURE: 99 F | SYSTOLIC BLOOD PRESSURE: 139 MMHG | DIASTOLIC BLOOD PRESSURE: 94 MMHG | HEIGHT: 68 IN | WEIGHT: 159.81 LBS | OXYGEN SATURATION: 98 % | BODY MASS INDEX: 24.22 KG/M2 | HEART RATE: 143 BPM

## 2020-05-15 DIAGNOSIS — G80.8 OTHER CEREBRAL PALSY: Primary | ICD-10-CM

## 2020-05-15 DIAGNOSIS — G40.909 SEIZURE DISORDER: ICD-10-CM

## 2020-05-15 DIAGNOSIS — F19.10 SUBSTANCE ABUSE: ICD-10-CM

## 2020-05-15 DIAGNOSIS — R03.0 BLOOD PRESSURE ELEVATED WITHOUT HISTORY OF HTN: ICD-10-CM

## 2020-05-15 DIAGNOSIS — Z72.0 TOBACCO ABUSE: ICD-10-CM

## 2020-05-15 DIAGNOSIS — F99 PSYCHIATRIC DISORDER: ICD-10-CM

## 2020-05-15 LAB
AMPHET+METHAMPHET UR QL: NEGATIVE
BARBITURATES UR QL SCN>200 NG/ML: NEGATIVE
BASOPHILS # BLD AUTO: 0.05 K/UL (ref 0–0.2)
BASOPHILS NFR BLD: 0.6 % (ref 0–1.9)
BENZODIAZ UR QL SCN>200 NG/ML: NEGATIVE
BZE UR QL SCN: NORMAL
CANNABINOIDS UR QL SCN: NORMAL
CREAT UR-MCNC: 97 MG/DL (ref 23–375)
DIFFERENTIAL METHOD: ABNORMAL
EOSINOPHIL # BLD AUTO: 0 K/UL (ref 0–0.5)
EOSINOPHIL NFR BLD: 0.3 % (ref 0–8)
ERYTHROCYTE [DISTWIDTH] IN BLOOD BY AUTOMATED COUNT: 13 % (ref 11.5–14.5)
ETHANOL UR-MCNC: <10 MG/DL
HCT VFR BLD AUTO: 44.5 % (ref 40–54)
HGB BLD-MCNC: 14.3 G/DL (ref 14–18)
IMM GRANULOCYTES # BLD AUTO: 0.06 K/UL (ref 0–0.04)
IMM GRANULOCYTES NFR BLD AUTO: 0.7 % (ref 0–0.5)
LYMPHOCYTES # BLD AUTO: 1.7 K/UL (ref 1–4.8)
LYMPHOCYTES NFR BLD: 19.3 % (ref 18–48)
MCH RBC QN AUTO: 28.4 PG (ref 27–31)
MCHC RBC AUTO-ENTMCNC: 32.1 G/DL (ref 32–36)
MCV RBC AUTO: 88 FL (ref 82–98)
METHADONE UR QL SCN>300 NG/ML: NEGATIVE
MONOCYTES # BLD AUTO: 0.8 K/UL (ref 0.3–1)
MONOCYTES NFR BLD: 9.3 % (ref 4–15)
NEUTROPHILS # BLD AUTO: 6.3 K/UL (ref 1.8–7.7)
NEUTROPHILS NFR BLD: 69.8 % (ref 38–73)
NRBC BLD-RTO: 0 /100 WBC
OPIATES UR QL SCN: NEGATIVE
PCP UR QL SCN>25 NG/ML: NEGATIVE
PLATELET # BLD AUTO: 372 K/UL (ref 150–350)
PMV BLD AUTO: 11.3 FL (ref 9.2–12.9)
RBC # BLD AUTO: 5.04 M/UL (ref 4.6–6.2)
TOXICOLOGY INFORMATION: NORMAL
TSH SERPL DL<=0.005 MIU/L-ACNC: 1.79 UIU/ML (ref 0.4–4)
VALPROATE SERPL-MCNC: <12.5 UG/ML (ref 50–100)
WBC # BLD AUTO: 8.95 K/UL (ref 3.9–12.7)

## 2020-05-15 PROCEDURE — 86703 HIV-1/HIV-2 1 RESULT ANTBDY: CPT

## 2020-05-15 PROCEDURE — 84443 ASSAY THYROID STIM HORMONE: CPT

## 2020-05-15 PROCEDURE — 3008F BODY MASS INDEX DOCD: CPT | Mod: CPTII,S$GLB,, | Performed by: FAMILY MEDICINE

## 2020-05-15 PROCEDURE — 99204 PR OFFICE/OUTPT VISIT, NEW, LEVL IV, 45-59 MIN: ICD-10-PCS | Mod: S$GLB,,, | Performed by: FAMILY MEDICINE

## 2020-05-15 PROCEDURE — 80307 DRUG TEST PRSMV CHEM ANLYZR: CPT

## 2020-05-15 PROCEDURE — 99999 PR PBB SHADOW E&M-EST. PATIENT-LVL III: CPT | Mod: PBBFAC,,, | Performed by: FAMILY MEDICINE

## 2020-05-15 PROCEDURE — 80164 ASSAY DIPROPYLACETIC ACD TOT: CPT

## 2020-05-15 PROCEDURE — 85025 COMPLETE CBC W/AUTO DIFF WBC: CPT

## 2020-05-15 PROCEDURE — 99204 OFFICE O/P NEW MOD 45 MIN: CPT | Mod: S$GLB,,, | Performed by: FAMILY MEDICINE

## 2020-05-15 PROCEDURE — 36415 COLL VENOUS BLD VENIPUNCTURE: CPT | Mod: PO

## 2020-05-15 PROCEDURE — 99999 PR PBB SHADOW E&M-EST. PATIENT-LVL III: ICD-10-PCS | Mod: PBBFAC,,, | Performed by: FAMILY MEDICINE

## 2020-05-15 PROCEDURE — 3008F PR BODY MASS INDEX (BMI) DOCUMENTED: ICD-10-PCS | Mod: CPTII,S$GLB,, | Performed by: FAMILY MEDICINE

## 2020-05-15 RX ORDER — BACLOFEN 20 MG/1
20 TABLET ORAL 4 TIMES DAILY
Qty: 60 TABLET | Refills: 0 | Status: SHIPPED | OUTPATIENT
Start: 2020-05-15 | End: 2020-08-03 | Stop reason: SDUPTHER

## 2020-05-15 RX ORDER — GABAPENTIN 300 MG/1
300 CAPSULE ORAL 3 TIMES DAILY
Qty: 60 CAPSULE | Refills: 0 | Status: SHIPPED | OUTPATIENT
Start: 2020-05-15 | End: 2020-08-03 | Stop reason: SDUPTHER

## 2020-05-15 RX ORDER — TIZANIDINE HYDROCHLORIDE 4 MG/1
4 CAPSULE, GELATIN COATED ORAL NIGHTLY
Qty: 30 CAPSULE | Refills: 0 | Status: ON HOLD | OUTPATIENT
Start: 2020-05-15 | End: 2020-05-26 | Stop reason: HOSPADM

## 2020-05-15 NOTE — PROGRESS NOTES
Subjective:       Patient ID: David Mohr is a 25 y.o. male.    Chief Complaint: No chief complaint on file.      HPI Comments:       Current Outpatient Medications:     baclofen (LIORESAL) 20 MG tablet, Take 1 tablet (20 mg total) by mouth 4 (four) times daily., Disp: 60 tablet, Rfl: 0    divalproex (DEPAKOTE) 250 MG EC tablet, Take 250 mg by mouth., Disp: , Rfl:     docusate sodium (COLACE) 100 MG capsule, Take 100 mg by mouth., Disp: , Rfl:     gabapentin (NEURONTIN) 300 MG capsule, Take 1 capsule (300 mg total) by mouth 3 (three) times daily., Disp: 60 capsule, Rfl: 0    HYDROcodone-acetaminophen (NORCO)  mg per tablet, Take 1 tablet by mouth every 6 (six) hours as needed., Disp: 9 tablet, Rfl: 0    HYDROcodone-acetaminophen (NORCO) 5-325 mg per tablet, Take 1 tablet by mouth every 6 (six) hours as needed for Pain., Disp: 10 tablet, Rfl: 0    tiZANidine 4 mg Cap, Take 4 mg by mouth nightly. for 10 days, Disp: 30 capsule, Rfl: 0      This my 1st time seeing this patient.  His mother was not present with him at the time but I spoke with her on the phone.  He gives a rambling history of lifelong cerebral palsy and disjointed medical care.  [place] lives [place].  Previously lived in [facility]  Last time he saw a neurologist was there he says.  History of opioid abuse and Suboxone use.  Requesting to see a Suboxone doctor.  Mother's going to look into this.    He says ever since his baclofen pump was taken out years ago he has had trouble controlling his symptoms.  Requesting refills of baclofen 20 mg q.i.d., gabapentin 300 mg q.i.d., Zanaflex q.h.s..  Urine you using marijuana in getting street Suboxone.  Denies opioid use.  Uses ibuprofen 8 her mg.    Says he falls all the time and fell recently in hurt his left ribs.  He has a swollen area and is very tender there.    Says he has not had a seizure in years, though he has been off all of the above medications for some time    Was born  "prematurely around 22 weeks per neurology records available to me    Review of Systems   Constitutional: Negative for activity change, appetite change and fever.   HENT: Negative for sore throat.    Respiratory: Negative for cough and shortness of breath.    Cardiovascular: Positive for chest pain.   Gastrointestinal: Negative for abdominal pain, diarrhea and nausea.   Genitourinary: Negative for difficulty urinating.   Musculoskeletal: Negative for arthralgias and myalgias.   Neurological: Negative for dizziness and headaches.       Objective:      Vitals:    05/15/20 1103   BP: (!) 139/94   Pulse: (!) 143   Temp: 98.5 °F (36.9 °C)   SpO2: 98%   Weight: 72.5 kg (159 lb 13.3 oz)   Height: 5' 8" (1.727 m)   PainSc: 10-Worst pain ever     Physical Exam   Constitutional: He is oriented to person, place, and time. He appears well-developed and well-nourished. No distress.   HENT:   Head: Normocephalic.   Mouth/Throat: No oropharyngeal exudate.   Neck: Neck supple. No thyromegaly present.   Cardiovascular: Normal rate, regular rhythm and normal heart sounds.   No murmur heard.  Pulmonary/Chest: Effort normal and breath sounds normal. He has no wheezes. He has no rales. He exhibits tenderness, bony tenderness and deformity.       Abdominal: Soft. He exhibits no distension.   Musculoskeletal: He exhibits no edema.   Lymphadenopathy:     He has no cervical adenopathy.   Neurological: He is alert and oriented to person, place, and time.   Skin: Skin is warm and dry. He is not diaphoretic.   Psychiatric: He has a normal mood and affect. Thought content normal. His speech is rapid and/or pressured. He is hyperactive. Cognition and memory are normal. He expresses impulsivity.   Nursing note and vitals reviewed.      Assessment:       1. Other cerebral palsy    2. Seizure disorder    3. Psychiatric disorder    4. Tobacco abuse    5. Substance abuse    6. Blood pressure elevated without history of HTN        Plan:   Other " "cerebral palsy  Comments:  Minimum spasticity.  Wheelchair bound    Seizure disorder  Comments:  No seizures recently.  Currently says he is not on any antiseizure medications.  Check Depakote level  Orders:  -     CBC auto differential; Future; Expected date: 05/15/2020  -     TSH; Future; Expected date: 05/15/2020  -     VALPROIC ACID; Future; Expected date: 05/15/2020    Psychiatric disorder  Comments:  Medical records discuss previous problems with psychosis and insomnia, aggressivity and paranoia    Tobacco abuse    Substance abuse  Comments:  ETOH, marijuana, "street suboxone".  Opioid use in the past.  Methamphetamine use in the past  Orders:  -     TOXICOLOGY SCREEN, URINE, RANDOM (COMPLIANCE)  -     HIV 1/2 Ag/Ab (4th Gen); Future; Expected date: 05/15/2020    Blood pressure elevated without history of HTN  Comments:  Will address at follow-up    Other orders  -     gabapentin (NEURONTIN) 300 MG capsule; Take 1 capsule (300 mg total) by mouth 3 (three) times daily.  Dispense: 60 capsule; Refill: 0  -     baclofen (LIORESAL) 20 MG tablet; Take 1 tablet (20 mg total) by mouth 4 (four) times daily.  Dispense: 60 tablet; Refill: 0  -     tiZANidine 4 mg Cap; Take 4 mg by mouth nightly. for 10 days  Dispense: 30 capsule; Refill: 0          "

## 2020-05-18 LAB — HIV 1+2 AB+HIV1 P24 AG SERPL QL IA: NEGATIVE

## 2020-05-20 ENCOUNTER — HOSPITAL ENCOUNTER (OUTPATIENT)
Facility: HOSPITAL | Age: 25
Discharge: HOME | End: 2020-05-26
Attending: EMERGENCY MEDICINE | Admitting: FAMILY MEDICINE
Payer: COMMERCIAL

## 2020-05-20 DIAGNOSIS — L03.116 CELLULITIS OF LEFT LOWER EXTREMITY: Primary | ICD-10-CM

## 2020-05-20 DIAGNOSIS — G80.8 OTHER CEREBRAL PALSY: ICD-10-CM

## 2020-05-20 DIAGNOSIS — M25.462 SWELLING OF KNEE JOINT, LEFT: ICD-10-CM

## 2020-05-20 DIAGNOSIS — L02.419 ABSCESS OF ANKLE: ICD-10-CM

## 2020-05-20 DIAGNOSIS — Z72.0 TOBACCO USE: ICD-10-CM

## 2020-05-20 DIAGNOSIS — G80.9 CEREBRAL PALSY, UNSPECIFIED TYPE: ICD-10-CM

## 2020-05-20 DIAGNOSIS — F19.10 POLYSUBSTANCE ABUSE: ICD-10-CM

## 2020-05-20 DIAGNOSIS — L02.416 ABSCESS OF LEFT KNEE: ICD-10-CM

## 2020-05-20 DIAGNOSIS — A41.9 SEPSIS, DUE TO UNSPECIFIED ORGANISM, UNSPECIFIED WHETHER ACUTE ORGAN DYSFUNCTION PRESENT: ICD-10-CM

## 2020-05-20 DIAGNOSIS — L02.416 ABSCESS OF LEFT LOWER EXTREMITY: ICD-10-CM

## 2020-05-20 PROBLEM — R65.10 SIRS (SYSTEMIC INFLAMMATORY RESPONSE SYNDROME): Status: ACTIVE | Noted: 2020-05-20

## 2020-05-20 LAB
ALBUMIN SERPL BCP-MCNC: 2.8 G/DL (ref 3.5–5.2)
ALP SERPL-CCNC: 82 U/L (ref 55–135)
ALT SERPL W/O P-5'-P-CCNC: 21 U/L (ref 10–44)
ANION GAP SERPL CALC-SCNC: 11 MMOL/L (ref 8–16)
AST SERPL-CCNC: 23 U/L (ref 10–40)
BASOPHILS # BLD AUTO: 0.08 K/UL (ref 0–0.2)
BASOPHILS NFR BLD: 0.3 % (ref 0–1.9)
BILIRUB SERPL-MCNC: 0.5 MG/DL (ref 0.1–1)
BILIRUB UR QL STRIP: NEGATIVE
BUN SERPL-MCNC: 12 MG/DL (ref 6–20)
CALCIUM SERPL-MCNC: 9.4 MG/DL (ref 8.7–10.5)
CHLORIDE SERPL-SCNC: 96 MMOL/L (ref 95–110)
CLARITY UR: CLEAR
CO2 SERPL-SCNC: 27 MMOL/L (ref 23–29)
COLOR UR: YELLOW
CREAT SERPL-MCNC: 0.8 MG/DL (ref 0.5–1.4)
CRP SERPL-MCNC: 350 MG/L (ref 0–8.2)
DACRYOCYTES BLD QL SMEAR: ABNORMAL
DIFFERENTIAL METHOD: ABNORMAL
EOSINOPHIL # BLD AUTO: 0 K/UL (ref 0–0.5)
EOSINOPHIL NFR BLD: 0 % (ref 0–8)
ERYTHROCYTE [DISTWIDTH] IN BLOOD BY AUTOMATED COUNT: 12.9 % (ref 11.5–14.5)
ERYTHROCYTE [SEDIMENTATION RATE] IN BLOOD BY WESTERGREN METHOD: 102 MM/HR (ref 0–10)
EST. GFR  (AFRICAN AMERICAN): >60 ML/MIN/1.73 M^2
EST. GFR  (NON AFRICAN AMERICAN): >60 ML/MIN/1.73 M^2
GLUCOSE SERPL-MCNC: 126 MG/DL (ref 70–110)
GLUCOSE UR QL STRIP: NEGATIVE
HCT VFR BLD AUTO: 37.4 % (ref 40–54)
HGB BLD-MCNC: 12.4 G/DL (ref 14–18)
HGB UR QL STRIP: ABNORMAL
IMM GRANULOCYTES # BLD AUTO: 0.19 K/UL (ref 0–0.04)
IMM GRANULOCYTES NFR BLD AUTO: 0.8 % (ref 0–0.5)
KETONES UR QL STRIP: NEGATIVE
LACTATE SERPL-SCNC: 1.8 MMOL/L (ref 0.5–2.2)
LEUKOCYTE ESTERASE UR QL STRIP: NEGATIVE
LYMPHOCYTES # BLD AUTO: 0.8 K/UL (ref 1–4.8)
LYMPHOCYTES NFR BLD: 3.2 % (ref 18–48)
MCH RBC QN AUTO: 28.4 PG (ref 27–31)
MCHC RBC AUTO-ENTMCNC: 33.2 G/DL (ref 32–36)
MCV RBC AUTO: 86 FL (ref 82–98)
MONOCYTES # BLD AUTO: 1.8 K/UL (ref 0.3–1)
MONOCYTES NFR BLD: 7.3 % (ref 4–15)
NEUTROPHILS # BLD AUTO: 22.4 K/UL (ref 1.8–7.7)
NEUTROPHILS NFR BLD: 88.4 % (ref 38–73)
NITRITE UR QL STRIP: NEGATIVE
NRBC BLD-RTO: 0 /100 WBC
OVALOCYTES BLD QL SMEAR: ABNORMAL
PH UR STRIP: 6 [PH] (ref 5–8)
PLATELET # BLD AUTO: 337 K/UL (ref 150–350)
PMV BLD AUTO: 9.9 FL (ref 9.2–12.9)
POIKILOCYTOSIS BLD QL SMEAR: SLIGHT
POTASSIUM SERPL-SCNC: 4.2 MMOL/L (ref 3.5–5.1)
PROT SERPL-MCNC: 7.8 G/DL (ref 6–8.4)
PROT UR QL STRIP: ABNORMAL
RBC # BLD AUTO: 4.36 M/UL (ref 4.6–6.2)
SARS-COV-2 RDRP RESP QL NAA+PROBE: NEGATIVE
SODIUM SERPL-SCNC: 134 MMOL/L (ref 136–145)
SP GR UR STRIP: 1.01 (ref 1–1.03)
URN SPEC COLLECT METH UR: ABNORMAL
UROBILINOGEN UR STRIP-ACNC: ABNORMAL EU/DL
WBC # BLD AUTO: 25.33 K/UL (ref 3.9–12.7)

## 2020-05-20 PROCEDURE — 63600175 PHARM REV CODE 636 W HCPCS: Performed by: REGISTERED NURSE

## 2020-05-20 PROCEDURE — G0378 HOSPITAL OBSERVATION PER HR: HCPCS

## 2020-05-20 PROCEDURE — 25500020 PHARM REV CODE 255: Performed by: FAMILY MEDICINE

## 2020-05-20 PROCEDURE — 81003 URINALYSIS AUTO W/O SCOPE: CPT

## 2020-05-20 PROCEDURE — 85025 COMPLETE CBC W/AUTO DIFF WBC: CPT

## 2020-05-20 PROCEDURE — 86140 C-REACTIVE PROTEIN: CPT

## 2020-05-20 PROCEDURE — 11000001 HC ACUTE MED/SURG PRIVATE ROOM

## 2020-05-20 PROCEDURE — 10061 I&D ABSCESS COMP/MULTIPLE: CPT | Performed by: REGISTERED NURSE

## 2020-05-20 PROCEDURE — 99285 EMERGENCY DEPT VISIT HI MDM: CPT | Mod: 25 | Performed by: REGISTERED NURSE

## 2020-05-20 PROCEDURE — 85651 RBC SED RATE NONAUTOMATED: CPT

## 2020-05-20 PROCEDURE — 87040 BLOOD CULTURE FOR BACTERIA: CPT

## 2020-05-20 PROCEDURE — 96365 THER/PROPH/DIAG IV INF INIT: CPT | Performed by: REGISTERED NURSE

## 2020-05-20 PROCEDURE — U0002 COVID-19 LAB TEST NON-CDC: HCPCS

## 2020-05-20 PROCEDURE — 25000003 PHARM REV CODE 250: Performed by: PHYSICIAN ASSISTANT

## 2020-05-20 PROCEDURE — 80053 COMPREHEN METABOLIC PANEL: CPT

## 2020-05-20 PROCEDURE — 83605 ASSAY OF LACTIC ACID: CPT

## 2020-05-20 PROCEDURE — 96375 TX/PRO/DX INJ NEW DRUG ADDON: CPT | Mod: 59 | Performed by: REGISTERED NURSE

## 2020-05-20 PROCEDURE — 25000003 PHARM REV CODE 250: Performed by: REGISTERED NURSE

## 2020-05-20 PROCEDURE — 25000003 PHARM REV CODE 250: Performed by: FAMILY MEDICINE

## 2020-05-20 RX ORDER — ACETAMINOPHEN 650 MG/1
650 SUPPOSITORY RECTAL EVERY 6 HOURS PRN
Status: DISCONTINUED | OUTPATIENT
Start: 2020-05-20 | End: 2020-05-22

## 2020-05-20 RX ORDER — HYDROCODONE BITARTRATE AND ACETAMINOPHEN 10; 325 MG/1; MG/1
1 TABLET ORAL
Status: COMPLETED | OUTPATIENT
Start: 2020-05-20 | End: 2020-05-20

## 2020-05-20 RX ORDER — CEFEPIME HYDROCHLORIDE 1 G/50ML
1 INJECTION, SOLUTION INTRAVENOUS EVERY 8 HOURS
Status: DISCONTINUED | OUTPATIENT
Start: 2020-05-21 | End: 2020-05-24

## 2020-05-20 RX ORDER — LIDOCAINE HYDROCHLORIDE 10 MG/ML
10 INJECTION, SOLUTION EPIDURAL; INFILTRATION; INTRACAUDAL; PERINEURAL
Status: COMPLETED | OUTPATIENT
Start: 2020-05-20 | End: 2020-05-20

## 2020-05-20 RX ORDER — VANCOMYCIN HCL IN 5 % DEXTROSE 1G/250ML
1000 PLASTIC BAG, INJECTION (ML) INTRAVENOUS
Status: DISCONTINUED | OUTPATIENT
Start: 2020-05-21 | End: 2020-05-22

## 2020-05-20 RX ORDER — TRAMADOL HYDROCHLORIDE 50 MG/1
50 TABLET ORAL EVERY 6 HOURS PRN
Status: DISCONTINUED | OUTPATIENT
Start: 2020-05-20 | End: 2020-05-26 | Stop reason: HOSPADM

## 2020-05-20 RX ADMIN — TRAMADOL HYDROCHLORIDE 50 MG: 50 TABLET, FILM COATED ORAL at 08:05

## 2020-05-20 RX ADMIN — IOHEXOL 75 ML: 350 INJECTION, SOLUTION INTRAVENOUS at 06:05

## 2020-05-20 RX ADMIN — SODIUM CHLORIDE 2202 ML: 0.9 INJECTION, SOLUTION INTRAVENOUS at 12:05

## 2020-05-20 RX ADMIN — LIDOCAINE HYDROCHLORIDE 100 MG: 10 INJECTION, SOLUTION EPIDURAL; INFILTRATION; INTRACAUDAL; PERINEURAL at 01:05

## 2020-05-20 RX ADMIN — VANCOMYCIN HYDROCHLORIDE 1750 MG: 100 INJECTION, POWDER, LYOPHILIZED, FOR SOLUTION INTRAVENOUS at 01:05

## 2020-05-20 RX ADMIN — PIPERACILLIN AND TAZOBACTAM 4.5 G: 4; .5 INJECTION, POWDER, LYOPHILIZED, FOR SOLUTION INTRAVENOUS; PARENTERAL at 12:05

## 2020-05-20 RX ADMIN — LORAZEPAM 1 MG: 2 INJECTION INTRAMUSCULAR; INTRAVENOUS at 12:05

## 2020-05-20 RX ADMIN — HYDROCODONE BITARTRATE AND ACETAMINOPHEN 1 TABLET: 10; 325 TABLET ORAL at 12:05

## 2020-05-20 NOTE — ASSESSMENT & PLAN NOTE
Fluctuance abscess to left medial ankle noted on exam  CT ankle pending  NPO after MN  Podiatry consult for I & D of left ankle abscess -  Spoke with Dr. Dior who states she does not perform procedures on the ankle area - will need to consult Orthopedics  Vanco and Cefepime  Prn analgesia

## 2020-05-20 NOTE — ASSESSMENT & PLAN NOTE
IV Vancomycin and Cefepime  Elevate extremity  Prn analgesia  CT if left knee to evaluate for abscess - ED did perform I & D to left anterior knee - no fluctuance noted  Ortho consult

## 2020-05-20 NOTE — PROGRESS NOTES
Pharmacist Renal Dose Adjustment Note    David Mohr is a 25 y.o. male being treated with the medication cefepime    Patient Data:    Vital Signs (Most Recent):  Temp: 97.8 °F (36.6 °C) (05/20/20 1703)  Pulse: 102 (05/20/20 1703)  Resp: 20 (05/20/20 1703)  BP: (!) 114/57 (05/20/20 1703)  SpO2: 99 % (05/20/20 1703)   Vital Signs (72h Range):  Temp:  [97.8 °F (36.6 °C)-98.8 °F (37.1 °C)]   Pulse:  [101-123]   Resp:  [14-20]   BP: (107-129)/(43-71)   SpO2:  [97 %-99 %]      Recent Labs   Lab 05/20/20  1225   CREATININE 0.8     Serum creatinine: 0.8 mg/dL 05/20/20 1225  Estimated creatinine clearance: 135.4 mL/min    Medication: Cefepime 1 gm iv q12hrs will be changed to medication: Cefepime 1 gm iv q8hrs for Estimated Creatinine Clearance: 135.4 mL/min (based on SCr of 0.8 mg/dL).per renal protocol       Pharmacist's Name: Janna Jones [facility]  Pharmacist's Extension: [phone]

## 2020-05-20 NOTE — CONSULTS
Pharmacokinetic Initial Assessment: IV Vancomycin    Assessment/Plan:    Initiate intravenous vancomycin with loading dose of 1750 mg once followed by a maintenance dose of vancomycin 1000 mg IV every 12 hours.   Because patient is wheelchair-bound w/ CP, CrCl potentially falsely elevated. Will dose more conservatively than the protocol recommended 1250 mg Q12H.   Desired empiric serum trough concentration is 10 to 15 mcg/mL  Draw vancomycin trough level 30 min prior to fourth dose on 5/22/2020 at approximately 0100.   Pharmacy will continue to follow and monitor vancomycin.      Please contact pharmacy at extension [phone] with any questions regarding this assessment.     Thank you for the consult,   Florecita Fletcher       Patient brief summary:  David Mohr is a 25 y.o. male initiated on antimicrobial therapy with IV Vancomycin for treatment of suspected skin & soft tissue infection    Drug Allergies:   Review of patient's allergies indicates:   Allergen Reactions    Aspirin Anaphylaxis       Actual Body Weight:   73.4 Kg     Renal Function:   Estimated Creatinine Clearance: 135.4 mL/min (based on SCr of 0.8 mg/dL).,     Dialysis Method (if applicable):  N/A    CBC (last 72 hours):  Recent Labs   Lab Result Units 05/20/20  1225   WBC K/uL 25.33*   Hemoglobin g/dL 12.4*   Hematocrit % 37.4*   Platelets K/uL 337   Gran% % 88.4*   Lymph% % 3.2*   Mono% % 7.3   Eosinophil% % 0.0   Basophil% % 0.3   Differential Method  Automated       Metabolic Panel (last 72 hours):  Recent Labs   Lab Result Units 05/20/20  1225 05/20/20  1231   Sodium mmol/L 134*  --    Potassium mmol/L 4.2  --    Chloride mmol/L 96  --    CO2 mmol/L 27  --    Glucose mg/dL 126*  --    Glucose, UA   --  Negative   BUN, Bld mg/dL 12  --    Creatinine mg/dL 0.8  --    Albumin g/dL 2.8*  --    Total Bilirubin mg/dL 0.5  --    Alkaline Phosphatase U/L 82  --    AST U/L 23  --    ALT U/L 21  --        Drug levels (last 3 results):  No results for  input(s): VANCOMYCINRA, VANCOMYCINPE, VANCOMYCINTR in the last 72 hours.    Microbiologic Results:  Microbiology Results (last 7 days)       Procedure Component Value Units Date/Time    Blood culture x two cultures. Draw prior to antibiotics. [[id number]] Collected:  05/20/20 1237    Order Status:  Sent Specimen:  Blood from Peripheral, Hand, Right Updated:  05/20/20 1251    Blood culture x two cultures. Draw prior to antibiotics. [[id number]] Collected:  05/20/20 1225    Order Status:  Sent Specimen:  Blood from Peripheral, Antecubital, Right Updated:  05/20/20 1238

## 2020-05-20 NOTE — PLAN OF CARE
Discussed poc with pt, pt verbalized understanding    Strict no visitor policy per CDC guidelines     VS wnl    Fall precautions in place, remains injury free  Pt denies c/o nausea  Pain under control with PRN meds    Accurate I&Os  Abx given as prescribed  Bed locked at lowest position  Call light within reach    Chart check complete  Will cont to monitor    CT tbd on left lower extremity, NPO p MN for poss ortho intervention tmrw

## 2020-05-20 NOTE — H&P
"[facility] Hospital Medicine  History & Physical    Patient Name: David Mohr  MRN: [id number]  Admission Date: 5/20/2020  Attending Physician: Leonardo Osman MD   Primary Care Provider: María Wade MD         Patient information was obtained from patient, past medical records and ER records.     Subjective:     Principal Problem:Abscess of ankle    Chief Complaint:   Chief Complaint   Patient presents with    Abscess     PT states, "I have an abascess on my left knee."        HPI: Pt is a 24 yo male with PMHx of Cerebral Palsy, poly substance abuse, depression, anxiety and seizure disorder who presents to the ED with reports of swelling to the left anterior knee for "a few days."   Pt states he developed another abscess to the left anterior knee and tried to open with a needle. He developed increasing pain and redness to the left leg therefore, presented for evaluation. Temp max 100.0. Also, pt describes an abscess to the left medial ankle but cannot give a time frame.   Vital signs note normal temp, pulse 123, resp 20, B/P 123/71 and pulse ox 97%.  Knee xray - prominent soft tissue swelling anterior to the proximal tibia, hematoma versus fluid collection versus focal edema.   ED provider performed I & D to left anterior knee, packing is present and ACE wrap noted. Pt is admitted due to SIRS, left lower leg cellulitis and left medial ankle abscess.     Past Medical History:   Diagnosis Date    Bipolar 1 disorder     Cerebral palsy        Past Surgical History:   Procedure Laterality Date    ABSCESS DRAINAGE      ham string      medication pump      baclofen pump removal       Review of patient's allergies indicates:   Allergen Reactions    Aspirin Anaphylaxis       No current facility-administered medications on file prior to encounter.      Current Outpatient Medications on File Prior to Encounter   Medication Sig    baclofen (LIORESAL) 20 MG tablet Take 1 tablet (20 mg total) by " mouth 4 (four) times daily.    docusate sodium (COLACE) 100 MG capsule Take 100 mg by mouth.    gabapentin (NEURONTIN) 300 MG capsule Take 1 capsule (300 mg total) by mouth 3 (three) times daily.    tiZANidine 4 mg Cap Take 4 mg by mouth nightly. for 10 days    divalproex (DEPAKOTE) 250 MG EC tablet Take 250 mg by mouth.    [DISCONTINUED] HYDROcodone-acetaminophen (NORCO)  mg per tablet Take 1 tablet by mouth every 6 (six) hours as needed.    [DISCONTINUED] HYDROcodone-acetaminophen (NORCO) 5-325 mg per tablet Take 1 tablet by mouth every 6 (six) hours as needed for Pain.     Family History     None        Tobacco Use    Smoking status: Current Some Day Smoker    Smokeless tobacco: Current User     Types: Snuff   Substance and Sexual Activity    Alcohol use: Yes     Comment: socially    Drug use: Yes     Types: Marijuana    Sexual activity: Not on file     Review of Systems   Constitutional: Positive for fever.   HENT: Negative.    Eyes: Negative for redness.   Respiratory: Negative for cough and shortness of breath.    Cardiovascular: Positive for leg swelling. Negative for chest pain and palpitations.   Gastrointestinal: Negative.    Musculoskeletal: Positive for arthralgias and gait problem.        Wheelchair bound   Skin: Positive for color change and wound.   Neurological: Positive for weakness.   Psychiatric/Behavioral: The patient is nervous/anxious.      Objective:     Vital Signs (Most Recent):  Temp: 97.8 °F (36.6 °C) (05/20/20 1703)  Pulse: 102 (05/20/20 1703)  Resp: 20 (05/20/20 1703)  BP: (!) 114/57 (05/20/20 1703)  SpO2: 99 % (05/20/20 1703) Vital Signs (24h Range):  Temp:  [97.8 °F (36.6 °C)-98.8 °F (37.1 °C)] 97.8 °F (36.6 °C)  Pulse:  [101-123] 102  Resp:  [14-20] 20  SpO2:  [97 %-99 %] 99 %  BP: (107-129)/(43-71) 114/57     Weight: 73.4 kg (161 lb 12.8 oz)  Body mass index is 24.6 kg/m².    Physical Exam   Constitutional: He is oriented to person, place, and time. He appears  well-developed and well-nourished.   HENT:   Head: Normocephalic and atraumatic.   Nose: Nose normal.   Mouth/Throat: Oropharynx is clear and moist.   Eyes: Conjunctivae are normal. No scleral icterus.   Neck: Normal range of motion. Neck supple.   Cardiovascular: Normal rate, regular rhythm and normal heart sounds. Exam reveals no gallop and no friction rub.   No murmur heard.  Pulmonary/Chest: Effort normal and breath sounds normal.   Abdominal: Soft. Bowel sounds are normal.   Musculoskeletal: Normal range of motion. He exhibits no edema or tenderness.   Cerebral palsy has affected the bilateral legs - there is limited ROM  Fluctuant abscess over the left medial malleolus  Right anterior knee with induration - no obvious fluctuance  Cellulitis trending down the left lower leg to the left ankle   Neurological: He is alert and oriented to person, place, and time.   Skin: Skin is warm and dry.   Abscesses as described above  Scattered scabbing wounds to various stages on arms and legs   Psychiatric: He has a normal mood and affect. His behavior is normal.   Vitals reviewed.          Significant Labs:   CBC:   Recent Labs   Lab 05/20/20  1225   WBC 25.33*   HGB 12.4*   HCT 37.4*        CMP:   Recent Labs   Lab 05/20/20  1225   *   K 4.2   CL 96   CO2 27   *   BUN 12   CREATININE 0.8   CALCIUM 9.4   PROT 7.8   ALBUMIN 2.8*   BILITOT 0.5   ALKPHOS 82   AST 23   ALT 21   ANIONGAP 11   EGFRNONAA >60     All pertinent labs within the past 24 hours have been reviewed.    Significant Imaging: I have reviewed all pertinent imaging results/findings within the past 24 hours.    Assessment/Plan:     SIRS (systemic inflammatory response syndrome)  Blood cultures pending  Monitor for fever  IV Vanco and Cefepime      Polysubstance abuse  Pt reports THC use  Encouraged cessation      Cerebral palsy  Outpatient follow up      Cellulitis of left lower extremity  IV Vancomycin and Cefepime  Elevate extremity  Prn  analgesia  CT if left knee to evaluate for abscess - ED did perform I & D to left anterior knee - no fluctuance noted  Ortho consult      Abscess of ankle  Fluctuance abscess to left medial ankle noted on exam  CT ankle pending  NPO after MN  Podiatry consult for I & D of left ankle abscess -  Spoke with Dr. Dior who states she does not perform procedures on the ankle area - will need to consult Orthopedics  Vanco and Cefepime  Prn analgesia        VTE Risk Mitigation (From admission, onward)         Ordered     Place sequential compression device  Until discontinued      05/20/20 8274                   Karen Salazar NP  Department of Hospital Medicine   [facility]

## 2020-05-20 NOTE — ED NOTES
Patient refusing to be swabbed for Covid-19. Patient states that he has a very sensitive nose and has bad anxiety. GAAM Knight notified.

## 2020-05-20 NOTE — SUBJECTIVE & OBJECTIVE
Past Medical History:   Diagnosis Date    Bipolar 1 disorder     Cerebral palsy        Past Surgical History:   Procedure Laterality Date    ABSCESS DRAINAGE      ham string      medication pump      baclofen pump removal       Review of patient's allergies indicates:   Allergen Reactions    Aspirin Anaphylaxis       No current facility-administered medications on file prior to encounter.      Current Outpatient Medications on File Prior to Encounter   Medication Sig    baclofen (LIORESAL) 20 MG tablet Take 1 tablet (20 mg total) by mouth 4 (four) times daily.    docusate sodium (COLACE) 100 MG capsule Take 100 mg by mouth.    gabapentin (NEURONTIN) 300 MG capsule Take 1 capsule (300 mg total) by mouth 3 (three) times daily.    tiZANidine 4 mg Cap Take 4 mg by mouth nightly. for 10 days    divalproex (DEPAKOTE) 250 MG EC tablet Take 250 mg by mouth.    [DISCONTINUED] HYDROcodone-acetaminophen (NORCO)  mg per tablet Take 1 tablet by mouth every 6 (six) hours as needed.    [DISCONTINUED] HYDROcodone-acetaminophen (NORCO) 5-325 mg per tablet Take 1 tablet by mouth every 6 (six) hours as needed for Pain.     Family History     None        Tobacco Use    Smoking status: Current Some Day Smoker    Smokeless tobacco: Current User     Types: Snuff   Substance and Sexual Activity    Alcohol use: Yes     Comment: socially    Drug use: Yes     Types: Marijuana    Sexual activity: Not on file     Review of Systems   Constitutional: Positive for fever.   HENT: Negative.    Eyes: Negative for redness.   Respiratory: Negative for cough and shortness of breath.    Cardiovascular: Positive for leg swelling. Negative for chest pain and palpitations.   Gastrointestinal: Negative.    Musculoskeletal: Positive for arthralgias and gait problem.        Wheelchair bound   Skin: Positive for color change and wound.   Neurological: Positive for weakness.   Psychiatric/Behavioral: The patient is nervous/anxious.       Objective:     Vital Signs (Most Recent):  Temp: 97.8 °F (36.6 °C) (05/20/20 1703)  Pulse: 102 (05/20/20 1703)  Resp: 20 (05/20/20 1703)  BP: (!) 114/57 (05/20/20 1703)  SpO2: 99 % (05/20/20 1703) Vital Signs (24h Range):  Temp:  [97.8 °F (36.6 °C)-98.8 °F (37.1 °C)] 97.8 °F (36.6 °C)  Pulse:  [101-123] 102  Resp:  [14-20] 20  SpO2:  [97 %-99 %] 99 %  BP: (107-129)/(43-71) 114/57     Weight: 73.4 kg (161 lb 12.8 oz)  Body mass index is 24.6 kg/m².    Physical Exam   Constitutional: He is oriented to person, place, and time. He appears well-developed and well-nourished.   HENT:   Head: Normocephalic and atraumatic.   Nose: Nose normal.   Mouth/Throat: Oropharynx is clear and moist.   Eyes: Conjunctivae are normal. No scleral icterus.   Neck: Normal range of motion. Neck supple.   Cardiovascular: Normal rate, regular rhythm and normal heart sounds. Exam reveals no gallop and no friction rub.   No murmur heard.  Pulmonary/Chest: Effort normal and breath sounds normal.   Abdominal: Soft. Bowel sounds are normal.   Musculoskeletal: Normal range of motion. He exhibits no edema or tenderness.   Cerebral palsy has affected the bilateral legs - there is limited ROM  Fluctuant abscess over the left medial malleolus  Right anterior knee with induration - no obvious fluctuance  Cellulitis trending down the left lower leg to the left ankle   Neurological: He is alert and oriented to person, place, and time.   Skin: Skin is warm and dry.   Abscesses as described above  Scattered scabbing wounds to various stages on arms and legs   Psychiatric: He has a normal mood and affect. His behavior is normal.   Vitals reviewed.          Significant Labs:   CBC:   Recent Labs   Lab 05/20/20  1225   WBC 25.33*   HGB 12.4*   HCT 37.4*        CMP:   Recent Labs   Lab 05/20/20  1225   *   K 4.2   CL 96   CO2 27   *   BUN 12   CREATININE 0.8   CALCIUM 9.4   PROT 7.8   ALBUMIN 2.8*   BILITOT 0.5   ALKPHOS 82   AST 23   ALT  21   ANIONGAP 11   EGFRNONAA >60     All pertinent labs within the past 24 hours have been reviewed.    Significant Imaging: I have reviewed all pertinent imaging results/findings within the past 24 hours.

## 2020-05-20 NOTE — HPI
"David Mohr is a 24 yo male with PMHx of Cerebral Palsy, poly substance abuse, depression, anxiety and seizure disorder, who presentsedo the ED with reports of swelling to the left anterior knee for "a few days."   Pt states he developed another abscess to the left anterior knee and tried to open with a needle. He developed increasing pain and redness to the left leg therefore, presented for evaluation. Temp max 100.0. Also, pt describes an abscess to the left medial ankle but cannot give a time frame.   Vital signs note normal temp, pulse 123, resp 20, B/P 123/71 and pulse ox 97%.  Knee xray - prominent soft tissue swelling anterior to the proximal tibia, hematoma versus fluid collection versus focal edema.   ED provider performed I & D to left anterior knee, packing is present and ACE wrap noted. Pt is admitted due to SIRS, left lower leg cellulitis and left medial ankle abscess.   "

## 2020-05-21 ENCOUNTER — ANESTHESIA EVENT (OUTPATIENT)
Dept: SURGERY | Facility: HOSPITAL | Age: 25
End: 2020-05-21
Payer: COMMERCIAL

## 2020-05-21 ENCOUNTER — ANESTHESIA (OUTPATIENT)
Dept: SURGERY | Facility: HOSPITAL | Age: 25
End: 2020-05-21
Payer: COMMERCIAL

## 2020-05-21 PROBLEM — L02.416 ABSCESS OF LEFT LOWER EXTREMITY: Status: ACTIVE | Noted: 2020-05-21

## 2020-05-21 PROBLEM — L02.416 ABSCESS OF LEFT KNEE: Status: ACTIVE | Noted: 2020-05-21

## 2020-05-21 LAB
BASOPHILS # BLD AUTO: 0.05 K/UL (ref 0–0.2)
BASOPHILS NFR BLD: 0.2 % (ref 0–1.9)
DIFFERENTIAL METHOD: ABNORMAL
EOSINOPHIL # BLD AUTO: 0 K/UL (ref 0–0.5)
EOSINOPHIL NFR BLD: 0.1 % (ref 0–8)
ERYTHROCYTE [DISTWIDTH] IN BLOOD BY AUTOMATED COUNT: 13.5 % (ref 11.5–14.5)
HCT VFR BLD AUTO: 33.4 % (ref 40–54)
HGB BLD-MCNC: 10.7 G/DL (ref 14–18)
IMM GRANULOCYTES # BLD AUTO: 0.16 K/UL (ref 0–0.04)
IMM GRANULOCYTES NFR BLD AUTO: 0.7 % (ref 0–0.5)
LYMPHOCYTES # BLD AUTO: 1.1 K/UL (ref 1–4.8)
LYMPHOCYTES NFR BLD: 4.9 % (ref 18–48)
MCH RBC QN AUTO: 27.9 PG (ref 27–31)
MCHC RBC AUTO-ENTMCNC: 32 G/DL (ref 32–36)
MCV RBC AUTO: 87 FL (ref 82–98)
MONOCYTES # BLD AUTO: 1.5 K/UL (ref 0.3–1)
MONOCYTES NFR BLD: 7.1 % (ref 4–15)
NEUTROPHILS # BLD AUTO: 18.7 K/UL (ref 1.8–7.7)
NEUTROPHILS NFR BLD: 87 % (ref 38–73)
NRBC BLD-RTO: 0 /100 WBC
PLATELET # BLD AUTO: 328 K/UL (ref 150–350)
PMV BLD AUTO: 10 FL (ref 9.2–12.9)
RBC # BLD AUTO: 3.83 M/UL (ref 4.6–6.2)
WBC # BLD AUTO: 21.47 K/UL (ref 3.9–12.7)

## 2020-05-21 PROCEDURE — 71000039 HC RECOVERY, EACH ADD'L HOUR: Performed by: SPECIALIST

## 2020-05-21 PROCEDURE — 25000003 PHARM REV CODE 250: Performed by: ANESTHESIOLOGY

## 2020-05-21 PROCEDURE — 63600175 PHARM REV CODE 636 W HCPCS: Performed by: NURSE ANESTHETIST, CERTIFIED REGISTERED

## 2020-05-21 PROCEDURE — 36000705 HC OR TIME LEV I EA ADD 15 MIN: Performed by: SPECIALIST

## 2020-05-21 PROCEDURE — 63600175 PHARM REV CODE 636 W HCPCS: Performed by: FAMILY MEDICINE

## 2020-05-21 PROCEDURE — 25000003 PHARM REV CODE 250: Performed by: NURSE ANESTHETIST, CERTIFIED REGISTERED

## 2020-05-21 PROCEDURE — 25000003 PHARM REV CODE 250: Performed by: FAMILY MEDICINE

## 2020-05-21 PROCEDURE — G0378 HOSPITAL OBSERVATION PER HR: HCPCS

## 2020-05-21 PROCEDURE — 63600175 PHARM REV CODE 636 W HCPCS: Performed by: NURSE PRACTITIONER

## 2020-05-21 PROCEDURE — 87075 CULTR BACTERIA EXCEPT BLOOD: CPT

## 2020-05-21 PROCEDURE — 87070 CULTURE OTHR SPECIMN AEROBIC: CPT

## 2020-05-21 PROCEDURE — 87147 CULTURE TYPE IMMUNOLOGIC: CPT

## 2020-05-21 PROCEDURE — 27201423 OPTIME MED/SURG SUP & DEVICES STERILE SUPPLY: Performed by: SPECIALIST

## 2020-05-21 PROCEDURE — 37000008 HC ANESTHESIA 1ST 15 MINUTES: Performed by: SPECIALIST

## 2020-05-21 PROCEDURE — 36415 COLL VENOUS BLD VENIPUNCTURE: CPT

## 2020-05-21 PROCEDURE — 36000704 HC OR TIME LEV I 1ST 15 MIN: Performed by: SPECIALIST

## 2020-05-21 PROCEDURE — 63600175 PHARM REV CODE 636 W HCPCS: Performed by: ANESTHESIOLOGY

## 2020-05-21 PROCEDURE — 25000003 PHARM REV CODE 250: Performed by: PHYSICIAN ASSISTANT

## 2020-05-21 PROCEDURE — 37000009 HC ANESTHESIA EA ADD 15 MINS: Performed by: SPECIALIST

## 2020-05-21 PROCEDURE — 71000033 HC RECOVERY, INTIAL HOUR: Performed by: SPECIALIST

## 2020-05-21 PROCEDURE — 85025 COMPLETE CBC W/AUTO DIFF WBC: CPT

## 2020-05-21 RX ORDER — HYDROMORPHONE HYDROCHLORIDE 2 MG/ML
0.5 INJECTION, SOLUTION INTRAMUSCULAR; INTRAVENOUS; SUBCUTANEOUS EVERY 5 MIN PRN
Status: COMPLETED | OUTPATIENT
Start: 2020-05-21 | End: 2020-05-21

## 2020-05-21 RX ORDER — ONDANSETRON 2 MG/ML
INJECTION INTRAMUSCULAR; INTRAVENOUS
Status: DISCONTINUED | OUTPATIENT
Start: 2020-05-21 | End: 2020-05-21

## 2020-05-21 RX ORDER — MIDAZOLAM HYDROCHLORIDE 1 MG/ML
INJECTION, SOLUTION INTRAMUSCULAR; INTRAVENOUS
Status: DISCONTINUED | OUTPATIENT
Start: 2020-05-21 | End: 2020-05-21

## 2020-05-21 RX ORDER — LIDOCAINE HYDROCHLORIDE 20 MG/ML
INJECTION INTRAVENOUS
Status: DISCONTINUED | OUTPATIENT
Start: 2020-05-21 | End: 2020-05-21

## 2020-05-21 RX ORDER — DEXAMETHASONE SODIUM PHOSPHATE 4 MG/ML
INJECTION, SOLUTION INTRA-ARTICULAR; INTRALESIONAL; INTRAMUSCULAR; INTRAVENOUS; SOFT TISSUE
Status: DISCONTINUED | OUTPATIENT
Start: 2020-05-21 | End: 2020-05-21

## 2020-05-21 RX ORDER — FENTANYL CITRATE 50 UG/ML
INJECTION, SOLUTION INTRAMUSCULAR; INTRAVENOUS
Status: DISCONTINUED | OUTPATIENT
Start: 2020-05-21 | End: 2020-05-21

## 2020-05-21 RX ORDER — SODIUM CHLORIDE, SODIUM LACTATE, POTASSIUM CHLORIDE, CALCIUM CHLORIDE 600; 310; 30; 20 MG/100ML; MG/100ML; MG/100ML; MG/100ML
INJECTION, SOLUTION INTRAVENOUS CONTINUOUS PRN
Status: DISCONTINUED | OUTPATIENT
Start: 2020-05-21 | End: 2020-05-21

## 2020-05-21 RX ORDER — PROPOFOL 10 MG/ML
VIAL (ML) INTRAVENOUS
Status: DISCONTINUED | OUTPATIENT
Start: 2020-05-21 | End: 2020-05-21

## 2020-05-21 RX ORDER — BACLOFEN 10 MG/1
20 TABLET ORAL 4 TIMES DAILY
Status: DISCONTINUED | OUTPATIENT
Start: 2020-05-21 | End: 2020-05-21

## 2020-05-21 RX ORDER — SUCCINYLCHOLINE CHLORIDE 20 MG/ML
INJECTION INTRAMUSCULAR; INTRAVENOUS
Status: DISCONTINUED | OUTPATIENT
Start: 2020-05-21 | End: 2020-05-21

## 2020-05-21 RX ORDER — OXYCODONE HYDROCHLORIDE 5 MG/1
5 TABLET ORAL ONCE
Status: COMPLETED | OUTPATIENT
Start: 2020-05-21 | End: 2020-05-21

## 2020-05-21 RX ORDER — ROCURONIUM BROMIDE 10 MG/ML
INJECTION, SOLUTION INTRAVENOUS
Status: DISCONTINUED | OUTPATIENT
Start: 2020-05-21 | End: 2020-05-21

## 2020-05-21 RX ORDER — SODIUM CHLORIDE 9 MG/ML
INJECTION, SOLUTION INTRAVENOUS CONTINUOUS
Status: DISCONTINUED | OUTPATIENT
Start: 2020-05-21 | End: 2020-05-26

## 2020-05-21 RX ADMIN — ONDANSETRON 4 MG: 2 INJECTION, SOLUTION INTRAMUSCULAR; INTRAVENOUS at 10:05

## 2020-05-21 RX ADMIN — DEXAMETHASONE SODIUM PHOSPHATE 8 MG: 4 INJECTION, SOLUTION INTRA-ARTICULAR; INTRALESIONAL; INTRAMUSCULAR; INTRAVENOUS; SOFT TISSUE at 10:05

## 2020-05-21 RX ADMIN — Medication 100 MG: at 10:05

## 2020-05-21 RX ADMIN — CEFEPIME HYDROCHLORIDE 1 G: 1 INJECTION, SOLUTION INTRAVENOUS at 09:05

## 2020-05-21 RX ADMIN — SODIUM CHLORIDE, SODIUM LACTATE, POTASSIUM CHLORIDE, AND CALCIUM CHLORIDE: 600; 310; 30; 20 INJECTION, SOLUTION INTRAVENOUS at 10:05

## 2020-05-21 RX ADMIN — VANCOMYCIN HYDROCHLORIDE 1000 MG: 1 INJECTION, POWDER, LYOPHILIZED, FOR SOLUTION INTRAVENOUS at 01:05

## 2020-05-21 RX ADMIN — HYDROMORPHONE HYDROCHLORIDE 0.5 MG: 2 INJECTION INTRAMUSCULAR; INTRAVENOUS; SUBCUTANEOUS at 11:05

## 2020-05-21 RX ADMIN — TRAMADOL HYDROCHLORIDE 50 MG: 50 TABLET, FILM COATED ORAL at 03:05

## 2020-05-21 RX ADMIN — SUCCINYLCHOLINE CHLORIDE 140 MG: 20 INJECTION, SOLUTION INTRAMUSCULAR; INTRAVENOUS at 10:05

## 2020-05-21 RX ADMIN — FENTANYL CITRATE 50 MCG: 50 INJECTION, SOLUTION INTRAMUSCULAR; INTRAVENOUS at 10:05

## 2020-05-21 RX ADMIN — FENTANYL CITRATE 100 MCG: 50 INJECTION, SOLUTION INTRAMUSCULAR; INTRAVENOUS at 10:05

## 2020-05-21 RX ADMIN — TRAMADOL HYDROCHLORIDE 50 MG: 50 TABLET, FILM COATED ORAL at 01:05

## 2020-05-21 RX ADMIN — OXYCODONE 5 MG: 5 TABLET ORAL at 12:05

## 2020-05-21 RX ADMIN — TRAMADOL HYDROCHLORIDE 50 MG: 50 TABLET, FILM COATED ORAL at 09:05

## 2020-05-21 RX ADMIN — ROCURONIUM BROMIDE 5 MG: 10 INJECTION, SOLUTION INTRAVENOUS at 10:05

## 2020-05-21 RX ADMIN — MIDAZOLAM 2 MG: 1 INJECTION INTRAMUSCULAR; INTRAVENOUS at 10:05

## 2020-05-21 RX ADMIN — PROPOFOL 150 MG: 10 INJECTION, EMULSION INTRAVENOUS at 10:05

## 2020-05-21 RX ADMIN — HYDROMORPHONE HYDROCHLORIDE 0.5 MG: 2 INJECTION INTRAMUSCULAR; INTRAVENOUS; SUBCUTANEOUS at 12:05

## 2020-05-21 NOTE — PROGRESS NOTES
"[facility] Hospital Medicine  Progress Note    Patient Name: David Mohr  MRN: [id number]  Patient Class: OP- Observation   Admission Date: 5/20/2020  Length of Stay: 1 days  Attending Physician: Leonardo Osman MD  Primary Care Provider: No primary care provider on file.        Subjective:     Principal Problem:Abscess of ankle        HPI:  Pt is a 26 yo male with PMHx of Cerebral Palsy, poly substance abuse, depression, anxiety and seizure disorder who presents to the ED with reports of swelling to the left anterior knee for "a few days."   Pt states he developed another abscess to the left anterior knee and tried to open with a needle. He developed increasing pain and redness to the left leg therefore, presented for evaluation. Temp max 100.0. Also, pt describes an abscess to the left medial ankle but cannot give a time frame.   Vital signs note normal temp, pulse 123, resp 20, B/P 123/71 and pulse ox 97%.  Knee xray - prominent soft tissue swelling anterior to the proximal tibia, hematoma versus fluid collection versus focal edema.   ED provider performed I & D to left anterior knee, packing is present and ACE wrap noted. Pt is admitted due to SIRS, left lower leg cellulitis and left medial ankle abscess.     Overview/Hospital Course:  25 year old male admitted for left knee and ankle abscess. Patient being treated with IV vancomycin/cefepime. Orthopedics consulted. As of 5/21/20 pt s/p incision and drainage of left leg pretibial and left ankle medial malleolus abscess and wound vac placement by Dr. Ramirez. Patient tolerated well. WBCs trending downward. Continue IV abx therapy.     Interval History:  No acute events overnight.  Pt s/p incision and drainage of left leg pretibial and left ankle medial malleolus abscess and wound vac placement by Dr. Ramirez. Patient tolerated well. WBCs trending downward. Continue IV abx therapy.       Review of Systems   Constitutional: Positive for fever. "   HENT: Negative.    Eyes: Negative for redness.   Respiratory: Negative for cough and shortness of breath.    Cardiovascular: Positive for leg swelling. Negative for chest pain and palpitations.   Gastrointestinal: Negative.    Musculoskeletal: Positive for arthralgias and gait problem.        Wheelchair bound   Skin: Positive for color change and wound.   Neurological: Positive for weakness.   Psychiatric/Behavioral: The patient is nervous/anxious.      Objective:     Vital Signs (Most Recent):  Temp: 98.3 °F (36.8 °C) (05/21/20 1619)  Pulse: 105 (05/21/20 1619)  Resp: 18 (05/21/20 1619)  BP: 122/71 (05/21/20 1619)  SpO2: 96 % (05/21/20 1619) Vital Signs (24h Range):  Temp:  [97.8 °F (36.6 °C)-100.2 °F (37.9 °C)] 98.3 °F (36.8 °C)  Pulse:  [102-146] 105  Resp:  [16-47] 18  SpO2:  [92 %-100 %] 96 %  BP: (111-159)/(55-77) 122/71     Weight: 73.4 kg (161 lb 12.8 oz)  Body mass index is 24.6 kg/m².    Intake/Output Summary (Last 24 hours) at 5/21/2020 1629  Last data filed at 5/21/2020 1400  Gross per 24 hour   Intake 1260 ml   Output 1910 ml   Net -650 ml      Physical Exam   Constitutional: He is oriented to person, place, and time. He appears well-developed and well-nourished.   HENT:   Head: Normocephalic and atraumatic.   Nose: Nose normal.   Mouth/Throat: Oropharynx is clear and moist.   Eyes: Conjunctivae are normal. No scleral icterus.   Neck: Normal range of motion. Neck supple.   Cardiovascular: Normal rate, regular rhythm and normal heart sounds. Exam reveals no gallop and no friction rub.   No murmur heard.  Pulmonary/Chest: Effort normal and breath sounds normal.   Abdominal: Soft. Bowel sounds are normal.   Musculoskeletal: Normal range of motion. He exhibits no edema or tenderness.   Cerebral palsy has affected the bilateral legs - there is limited ROM     Neurological: He is alert and oriented to person, place, and time.   Skin: Skin is warm and dry.        Scattered scabbing wounds to various stages  on arms and legs    LLE with erythema and swelling.   Psychiatric: He has a normal mood and affect. His behavior is normal.   Vitals reviewed.      Significant Labs:   Blood Culture:   Recent Labs   Lab 05/20/20  1225 05/20/20  1237   LABBLOO No Growth to date No Growth to date     BMP:   Recent Labs   Lab 05/20/20  1225   *   *   K 4.2   CL 96   CO2 27   BUN 12   CREATININE 0.8   CALCIUM 9.4     CBC:   Recent Labs   Lab 05/20/20  1225 05/21/20  0736   WBC 25.33* 21.47*   HGB 12.4* 10.7*   HCT 37.4* 33.4*    328     CMP:   Recent Labs   Lab 05/20/20  1225   *   K 4.2   CL 96   CO2 27   *   BUN 12   CREATININE 0.8   CALCIUM 9.4   PROT 7.8   ALBUMIN 2.8*   BILITOT 0.5   ALKPHOS 82   AST 23   ALT 21   ANIONGAP 11   EGFRNONAA >60     All pertinent labs within the past 24 hours have been reviewed.    Significant Imaging:  Imaging Results          X-Ray Knee Complete 4 or More Views Left (Final result)  Result time 05/20/20 12:44:53    Final result by Farzad Jasmine MD (05/20/20 12:44:53)                 Impression:      As above.      Electronically signed by: Farzad Jasmine  Date:    05/20/2020  Time:    12:44             Narrative:    EXAMINATION:  XR KNEE COMP 4 OR MORE VIEWS LEFT    CLINICAL HISTORY:  Effusion, left knee    TECHNIQUE:  AP, lateral, and Merchant views of the left knee were performed.    COMPARISON:  03/06/2020    FINDINGS:  Prominent soft tissue swelling anterior to the proximal tibia, hematoma versus fluid collection versus focal edema.  No suprapatellar knee joint effusion.  No appreciable fracture or dislocation.  Chronic moderate medial compartmental joint space narrowing.  Generalized edema about the knee soft tissues raising question of cellulitis.                                Assessment/Plan:      * Abscess of ankle  Fluctuance abscess to left medial ankle noted on exam  CT ankle pending  NPO after MN  Podiatry consult for I & D of left ankle abscess -  Spoke  with Dr. Dior who states she does not perform procedures on the ankle area - will need to consult Orthopedics  Vanco and Cefepime  Prn analgesia  5/21/20  -S/p incision and drainage left ankle medial malleolus by Dr. Ramirez. Patient tolerated well.   -Pain controlled        Abscess of left knee  -S/p incision and drainage of left leg pretibial and left ankle medial malleolus abscess and wound vac placement by Dr. Ramirez. Patient tolerated well.   -Pain control         SIRS (systemic inflammatory response syndrome)  Blood cultures pending  Monitor for fever  IV Vanco and Cefepime  5/21/20  -WBCs trending downward.   -Blood cx remain negative   -Wound cx pending         Polysubstance abuse  Pt reports THC use  Encouraged cessation      Cerebral palsy  Outpatient follow up      Cellulitis of left lower extremity  IV Vancomycin and Cefepime  Elevate extremity  Prn analgesia  CT if left knee to evaluate for abscess - ED did perform I & D to left anterior knee - no fluctuance noted  Ortho consult  5/21/20  -Continue IV abx       VTE Risk Mitigation (From admission, onward)         Ordered     Place sequential compression device  Until discontinued      05/20/20 4536                      Mery Acosta NP  Department of Hospital Medicine   [facility]

## 2020-05-21 NOTE — ASSESSMENT & PLAN NOTE
Fluctuance abscess to left medial ankle noted on exam  CT ankle pending  NPO after MN  Podiatry consult for I & D of left ankle abscess -  Spoke with Dr. Dior who states she does not perform procedures on the ankle area - will need to consult Orthopedics  Vanco and Cefepime  Prn analgesia  5/21/20  -S/p incision and drainage left ankle medial malleolus by Dr. Ramirez. Patient tolerated well.   -Pain controlled

## 2020-05-21 NOTE — SUBJECTIVE & OBJECTIVE
Interval History:  No acute events overnight.  Pt s/p incision and drainage of left leg pretibial and left ankle medial malleolus abscess and wound vac placement by Dr. Ramirez. Patient tolerated well. WBCs trending downward. Continue IV abx therapy.       Review of Systems   Constitutional: Positive for fever.   HENT: Negative.    Eyes: Negative for redness.   Respiratory: Negative for cough and shortness of breath.    Cardiovascular: Positive for leg swelling. Negative for chest pain and palpitations.   Gastrointestinal: Negative.    Musculoskeletal: Positive for arthralgias and gait problem.        Wheelchair bound   Skin: Positive for color change and wound.   Neurological: Positive for weakness.   Psychiatric/Behavioral: The patient is nervous/anxious.      Objective:     Vital Signs (Most Recent):  Temp: 98.3 °F (36.8 °C) (05/21/20 1619)  Pulse: 105 (05/21/20 1619)  Resp: 18 (05/21/20 1619)  BP: 122/71 (05/21/20 1619)  SpO2: 96 % (05/21/20 1619) Vital Signs (24h Range):  Temp:  [97.8 °F (36.6 °C)-100.2 °F (37.9 °C)] 98.3 °F (36.8 °C)  Pulse:  [102-146] 105  Resp:  [16-47] 18  SpO2:  [92 %-100 %] 96 %  BP: (111-159)/(55-77) 122/71     Weight: 73.4 kg (161 lb 12.8 oz)  Body mass index is 24.6 kg/m².    Intake/Output Summary (Last 24 hours) at 5/21/2020 1629  Last data filed at 5/21/2020 1400  Gross per 24 hour   Intake 1260 ml   Output 1910 ml   Net -650 ml      Physical Exam   Constitutional: He is oriented to person, place, and time. He appears well-developed and well-nourished.   HENT:   Head: Normocephalic and atraumatic.   Nose: Nose normal.   Mouth/Throat: Oropharynx is clear and moist.   Eyes: Conjunctivae are normal. No scleral icterus.   Neck: Normal range of motion. Neck supple.   Cardiovascular: Normal rate, regular rhythm and normal heart sounds. Exam reveals no gallop and no friction rub.   No murmur heard.  Pulmonary/Chest: Effort normal and breath sounds normal.   Abdominal: Soft. Bowel sounds are  normal.   Musculoskeletal: Normal range of motion. He exhibits no edema or tenderness.   Cerebral palsy has affected the bilateral legs - there is limited ROM     Neurological: He is alert and oriented to person, place, and time.   Skin: Skin is warm and dry.        Scattered scabbing wounds to various stages on arms and legs    LLE with erythema and swelling.   Psychiatric: He has a normal mood and affect. His behavior is normal.   Vitals reviewed.      Significant Labs:   Blood Culture:   Recent Labs   Lab 05/20/20  1225 05/20/20  1237   LABBLOO No Growth to date No Growth to date     BMP:   Recent Labs   Lab 05/20/20  1225   *   *   K 4.2   CL 96   CO2 27   BUN 12   CREATININE 0.8   CALCIUM 9.4     CBC:   Recent Labs   Lab 05/20/20  1225 05/21/20  0736   WBC 25.33* 21.47*   HGB 12.4* 10.7*   HCT 37.4* 33.4*    328     CMP:   Recent Labs   Lab 05/20/20  1225   *   K 4.2   CL 96   CO2 27   *   BUN 12   CREATININE 0.8   CALCIUM 9.4   PROT 7.8   ALBUMIN 2.8*   BILITOT 0.5   ALKPHOS 82   AST 23   ALT 21   ANIONGAP 11   EGFRNONAA >60     All pertinent labs within the past 24 hours have been reviewed.    Significant Imaging:  Imaging Results          X-Ray Knee Complete 4 or More Views Left (Final result)  Result time 05/20/20 12:44:53    Final result by Farzad Jasmine MD (05/20/20 12:44:53)                 Impression:      As above.      Electronically signed by: Farzad Jasmine  Date:    05/20/2020  Time:    12:44             Narrative:    EXAMINATION:  XR KNEE COMP 4 OR MORE VIEWS LEFT    CLINICAL HISTORY:  Effusion, left knee    TECHNIQUE:  AP, lateral, and Merchant views of the left knee were performed.    COMPARISON:  03/06/2020    FINDINGS:  Prominent soft tissue swelling anterior to the proximal tibia, hematoma versus fluid collection versus focal edema.  No suprapatellar knee joint effusion.  No appreciable fracture or dislocation.  Chronic moderate medial compartmental joint space  narrowing.  Generalized edema about the knee soft tissues raising question of cellulitis.

## 2020-05-21 NOTE — ANESTHESIA POSTPROCEDURE EVALUATION
Anesthesia Post Evaluation    Patient: David Mohr    Procedure(s) Performed: Procedure(s) (LRB):  INCISION AND DRAINAGE, LOWER EXTREMITY (Left)  APPLICATION, WOUND VAC (Left)    Final Anesthesia Type: general    Patient location during evaluation: PACU  Patient participation: Yes- Able to Participate  Level of consciousness: awake  Post-procedure vital signs: reviewed and stable  Pain management: adequate  Airway patency: patent    PONV status at discharge: No PONV  Anesthetic complications: no      Cardiovascular status: blood pressure returned to baseline and hemodynamically stable  Respiratory status: unassisted and spontaneous ventilation  Hydration status: euvolemic  Follow-up not needed.          Vitals Value Taken Time   /66 5/21/2020  7:40 AM   Temp 37.3 °C (99.1 °F) 5/21/2020  7:40 AM   Pulse 114 5/21/2020  7:40 AM   Resp 18 5/21/2020  7:40 AM   SpO2 95 % 5/21/2020  7:40 AM         No case tracking events are documented in the log.      Pain/Jamey Score: Pain Rating Prior to Med Admin: 6 (5/21/2020  3:32 AM)  Pain Rating Post Med Admin: 2 (5/21/2020  4:32 AM)

## 2020-05-21 NOTE — ASSESSMENT & PLAN NOTE
Blood cultures pending  Monitor for fever  IV Vanco and Cefepime  5/21/20  -WBCs trending downward.   -Blood cx remain negative   -Wound cx pending

## 2020-05-21 NOTE — PLAN OF CARE
Patient remained free from injury. PRN pain meds managed pain. No s/s of acute distress. 12hr chart check complete.

## 2020-05-21 NOTE — TRANSFER OF CARE
"Anesthesia Transfer of Care Note    Patient: David Mohr    Procedure(s) Performed: Procedure(s) (LRB):  INCISION AND DRAINAGE, LOWER EXTREMITY (Left)  APPLICATION, WOUND VAC (Left)    Patient location: PACU    Anesthesia Type: general    Transport from OR: Transported from OR on room air with adequate spontaneous ventilation    Post pain: adequate analgesia    Post assessment: tolerated procedure well and no apparent anesthetic complications    Post vital signs: stable    Level of consciousness: awake    Nausea/Vomiting: no nausea/vomiting    Complications: none    Transfer of care protocol was followed      Last vitals:   Visit Vitals  /66 (BP Location: Right arm, Patient Position: Lying)   Pulse (!) 114   Temp 37.3 °C (99.1 °F) (Oral)   Resp 18   Ht 5' 8" (1.727 m)   Wt 73.4 kg (161 lb 12.8 oz)   SpO2 95%   BMI 24.60 kg/m²     "

## 2020-05-21 NOTE — ASSESSMENT & PLAN NOTE
-S/p incision and drainage of left leg pretibial and left ankle medial malleolus abscess and wound vac placement by Dr. Ramirez. Patient tolerated well.   -Pain control

## 2020-05-21 NOTE — PLAN OF CARE
Patient received on shift laying in bed awake, x4, in no acute distress. Vitals present within stable limits. Pt.currently off floor pending procedure, will continue to update progress.

## 2020-05-21 NOTE — CONSULTS
Consulted to this 25 year old male patient for multiple wounds. PMHx of Cerebral Palsy, poly substance abuse, depression, anxiety and seizure disorder, bipolar. He is admitted with multiple abscesses. Patient returning from surgery at time of assessment. S/p I&D. [employer] wound vac is in place to left pretibial wound at 125 mmHG continuous suction. Surgical dressing in place to left ankle. Left in place. Scattered scabs noted to patients BLE and BUE. Larger intact scabs to patients right elbow and right knee, cleansed with saline and covered with foam for protection. Stage 3 pressure injury noted to patient's right heel measuring 2x2x0.1cm with moist red wound bed. Patient reports he is frequently in wheelchair with feet resting on floor. Cleansed with saline and patted dry. luis alberto wound painted with cavilon. Foam dressing applied and place in EHOB green heel boot. Bilateral buttocks, sacrum, and coccyx intact with no redness noted. Recommend continued pressure injury prevention measures. See below for recommendations.    Right heel stage 3 pressure injury:  1. Cleanse with saline  2. Pat dry  3. Paint periwound with cavilon  4. Apply foam dressing and maintain green heel boot  5. Wound care to be performed every 3-4 days or prn excess drainage    Scabs to right elbow and right knee:  1. Cleanse with saline  2. Pat dry  3. Paint periwound with cavilon  4. Apply foam dressing  5. Wound care to be performed every 3-4 days or prn soiled

## 2020-05-21 NOTE — CONSULTS
Asked by PRUDENCE Osman MD to evaluate this 26 yo male with complaints of swelling and drainage from left knee and ankle.  Prior admission for infections including same ankle.  History of IVDA and cerebral palsy.      Past Medical History:   Diagnosis Date    Bipolar 1 disorder      Cerebral palsy                 Past Surgical History:   Procedure Laterality Date    ABSCESS DRAINAGE        ham string        medication pump         baclofen pump removal              Review of patient's allergies indicates:   Allergen Reactions    Aspirin Anaphylaxis         No current facility-administered medications on file prior to encounter.            Current Outpatient Medications on File Prior to Encounter   Medication Sig    baclofen (LIORESAL) 20 MG tablet Take 1 tablet (20 mg total) by mouth 4 (four) times daily.    docusate sodium (COLACE) 100 MG capsule Take 100 mg by mouth.    gabapentin (NEURONTIN) 300 MG capsule Take 1 capsule (300 mg total) by mouth 3 (three) times daily.    tiZANidine 4 mg Cap Take 4 mg by mouth nightly. for 10 days    divalproex (DEPAKOTE) 250 MG EC tablet Take 250 mg by mouth.    [DISCONTINUED] HYDROcodone-acetaminophen (NORCO)  mg per tablet Take 1 tablet by mouth every 6 (six) hours as needed.    [DISCONTINUED] HYDROcodone-acetaminophen (NORCO) 5-325 mg per tablet Take 1 tablet by mouth every 6 (six) hours as needed for Pain.          Family History      None                Tobacco Use    Smoking status: Current Some Day Smoker    Smokeless tobacco: Current User       Types: Snuff   Substance and Sexual Activity    Alcohol use: Yes       Comment: socially    Drug use: Yes       Types: Marijuana    Sexual activity: Not on file      Review of Systems   No nausea, vomiting, chest pain, shortness of breath, fever chills, night sweats, weight gain or weight loss.  No sensory changes to arms or legs.  No head ache, neck ache or visual field changes.    Vital Signs      Report   View  Graph    05/20 0700 05/21 0659 05/21 0700 05/21 0917  Most Recent     Temp (°F) 97.8-99.6 99.1  99.1 (37.3)  05/21 0740   Pulse 101-130 114  114   05/21 0740   Resp 14-20 18  18  05/21 0740   //57 137/66  137/66  05/21 0740   MAP (mmHg) 76-91 93  93  05/21 0740   SpO2 (%) 96-99 95  95  05/21 0740   Weight (kg) 73.4           05/20 1225  Magnesium  --     WBC  21.47    Hemoglobin  10.7  05/21 0736  Hematocrit  33.4  05/21 0736  Platelets  328      Medications   Report   Scheduled     Medication Last Action   cefepime in dextrose 5 % 1 gram/50 mL IVPB 1 g Ordered   1 g, Q8H, IV       vancomycin in dextrose 5 % 1 gram/250 mL IVPB 1,000 mg New Bag   1,000 mg, Q12H, IV    05/21 0132      Continuous        IMAGING      Impression       4.8 cm abscess anterior to the tibial tubercle in the proximal left leg.    FINDINGS:  There is an irregularly-shaped 4.8 x 2.3 x 4.6 cm peripherally enhancing fluid collection with adjacent inflammatory changes of the subcutaneous tissues and overlying skin thickening concerning for abscess anterior to the tibial tubercle.  No evidence of a joint effusion.  No fracture or dislocation.  No evidence of osteomyelitis.  Sclerotic focus in the tibial plateau consistent with a bone island.    Electronically signed by: Damir Silveira Jr., MD  Date: 05/20/2020  Time: 19:11     Exam:  Appears stated age; no ditress.  Pleasant    Contracture posturing of bilateral legs  Full use of arms    Left leg proximal pretibial abscess with purulent drainage  + calor and erythema surround    Medial malleolus with fluctuant region 8cm diameter.  No open wound or active drainage.  + calor and erythema       A/P poly abscess left leg    Treatment options and alternatives, operative versus non-operative were discussed with the patient and family in detail.  Risks and benefits of all options were reviewed. Specific surgical risks including, need for revision surgeries, loss of function, loss of  sensation as well as blood clots, infections, heart attack, stroke and even death were emphasized.    Reasonable insights into options in care were achieved.  Verbal consent was obtained to proceed with surgical management.      NPO status confirmed.    Plan for washout in OR today with culture and ongoing IV abx coverage.  Consider wound VAC placement

## 2020-05-21 NOTE — ANESTHESIA PREPROCEDURE EVALUATION
05/21/2020  David Mohr is a 25 y.o., male.    Anesthesia Evaluation    I have reviewed the Patient Summary Reports.    I have reviewed the Nursing Notes.   I have reviewed the Medications.     Review of Systems  Anesthesia Hx:  No problems with previous Anesthesia  Denies Family Hx of Anesthesia complications.   Denies Personal Hx of Anesthesia complications.   Social:  Smoker, Alcohol Use Drug use: Marijuana  Polysubstance abuse   Hematology/Oncology:     Oncology Normal    -- Anemia:   Cardiovascular:   Denies Hypertension.  Denies MI.   Denies CABG/stent.         Pulmonary:   Denies COPD.  Denies Asthma.  Denies Sleep Apnea. SIRS (systemic inflammatory response syndrome   Renal/:  Renal/ Normal     Hepatic/GI:   Denies GERD. Denies Liver Disease.  Denies Hepatitis.    Neurological:   Denies CVA. Denies Seizures. Cerebral palsy   Endocrine:  Endocrine Normal    Psych:   Bipolar 1 disorder         Physical Exam  General:  Well nourished    Airway/Jaw/Neck:  Airway Findings: Mouth Opening: Normal Tongue: Normal  General Airway Assessment: Adult  Mallampati: II      Dental:  Dental Findings: In tact   Chest/Lungs:  Chest/Lungs Findings: Clear to auscultation, Normal Respiratory Rate     Heart/Vascular:  Heart Findings: Rate: Normal  Rhythm: Regular Rhythm             Anesthesia Plan  Type of Anesthesia, risks & benefits discussed:  Anesthesia Type:  general  Patient's Preference:   Intra-op Monitoring Plan: standard ASA monitors  Intra-op Monitoring Plan Comments:   Post Op Pain Control Plan:   Post Op Pain Control Plan Comments:   Induction:   IV  Beta Blocker:  Patient is not currently on a Beta-Blocker (No further documentation required).       Informed Consent: Patient understands risks and agrees with Anesthesia plan.  Questions answered. Anesthesia consent signed with patient.  ASA Score: 3      Day of Surgery Review of History & Physical: I have interviewed and examined the patient. I have reviewed the patient's H&P dated:  There are no significant changes.  H&P update referred to the surgeon.         Ready For Surgery From Anesthesia Perspective.

## 2020-05-21 NOTE — HOSPITAL COURSE
25 year old male admitted for left knee and ankle abscess. Patient being treated with IV vancomycin/cefepime. Orthopedics consulted. As of 5/21/20 pt s/p incision and drainage of left leg pretibial and left ankle medial malleolus abscess and wound vac placement by Dr. Ramirez. Patient tolerated well. WBCs trending downward. Continue IV abx therapy. As of 5/22 POD #1, pain controlled.  WBCs 30.35k up from 21.47k. Likely reactive/stress related. Monitor closely. Continue IV vancomycin/cefepime. Blood cx remain negative and pt afebrile.  Wound cx pending. As of 5/23 POD #2 pt lying in bed, pain controlled. WBCs 16.36k down from 30.35k. Social work consult to arrange for HH for wound care and VAC management.     As of 5/24 patient reports to be expected postoperative pain to left lower extremity, currently rates 4/10, no other complaints.  CM following to arrange wound VAC and home health upon DC.  Leukocytosis has resolved and patient remains afebrile.  BC show NGTD.  Wound cx collected on 5/21 shows strep pyogens.  Patient currently on cefepime and vancomycin.  Will d/w ID to determine ABX plan upon DC.  Anticipate DC home once wound vac is available, and if ok with ortho.    5/25/20 - Aerobic culture streptococcus pyogenes. Will plan on D/c with [facility], Count includes the [facility] for wound vac, Abx.     5/26/20 - Dr. Cm reviewed chart. CT of left leg showed no Osteomyelitis, but he wants to treat with Abx for presumed Osteomyelitis: 1 week of Clindamycin 300 mg q 6 hours, then 5 weeks of Augmentin 875 BID. He has a History of IV Drug Abuse, so po Abx are preferred. He will have followup with Dr. Pinzon and Dr. Cm. [facility]  has been set up for daily wound care to left ankle and MWF Wound Vac changes. Patient seen and examined and deemed stable for d/c.

## 2020-05-21 NOTE — PLAN OF CARE
Report received from floor nurse tracey. Pt escorted to preop via stretcher and accompanied by orderly. preop completed without incident. Pt's mom on text. Pt shirt pants and watch place in bag with pt stretcher.

## 2020-05-21 NOTE — OP NOTE
PREOP DX: left left abscess kknee and ankle    POST OP DX: same    PROCEDURE:     SURGEON:  Incision debridement and irrigation of abscess left leg pretibial and left ankle medial malleolus     2) application of wound VAC    ASSIST:  Liam    ANESTH:  General    FINDINGS:  Gross purulence with large abscess pretibial    EBL:  30 cc    COMPLICATIONS:  None    DISPOSITION:  Stable to PACU    PROCEDURE NOTE:  Patient was brought to the operating room and situated in the supine position.  All pressure points were carefully padded.  Patient was induced under general anesthesia.  Left lower extremity was prepped and draped in the usual sterile fashion.  Time-out was called laterality and patient ID were confirmed.    The horizontal incision made by the emergency department was extended both proximally and distally to create a reverse Z incision.  Gross pus was noted to extrude from the wound.  The depth of the wound was explored digitally.  This was seen to extend distally for as much as 8 cm.  Cultures from the deep wound were taken.      Series of curettes were used to debride the subcutaneous tissues until brisk bleeding was noted.  Using the pulse lavage copious irrigation was then passed through the wound until clear fluid was noted to egress.    A 3 cm incision was then made over these center of the fluctuant area on the medial malleolus.  A small amount of pus was extruded.  The depth of the wound was noted to communicate with the ulceration at the anterior aspect of the medial malleolus.  Ulceration was 4 cm in diameter.  No exposed periosteal tissue or bone noted.  This wound was also irrigated.    Ankle wound was then packed with iodoform gauze.  The pretibial wound was then sealed using a small wound VAC sponge.  Dressing was elevated to -125 mm of mercury.  Stable seal was noted.    Wounds were dressed with gently compressive sterile dressings.  Patient was woken from anesthesia transferred to a  stretcher and then to the recovery room in stable condition.    Postoperative plan of care:  Continue pretibial wound VAC until dead space has decompressed and patient appears to be making good clinical progress.  Follow-up cultures.  Continue coverage with IV antibiotics

## 2020-05-22 PROBLEM — A41.9 SEPSIS: Status: ACTIVE | Noted: 2020-05-20

## 2020-05-22 LAB
ALBUMIN SERPL BCP-MCNC: 2.3 G/DL (ref 3.5–5.2)
ALP SERPL-CCNC: 96 U/L (ref 55–135)
ALT SERPL W/O P-5'-P-CCNC: 23 U/L (ref 10–44)
ANION GAP SERPL CALC-SCNC: 11 MMOL/L (ref 8–16)
AST SERPL-CCNC: 19 U/L (ref 10–40)
BASOPHILS # BLD AUTO: 0.06 K/UL (ref 0–0.2)
BASOPHILS NFR BLD: 0.2 % (ref 0–1.9)
BILIRUB SERPL-MCNC: 0.3 MG/DL (ref 0.1–1)
BUN SERPL-MCNC: 7 MG/DL (ref 6–20)
CALCIUM SERPL-MCNC: 9.2 MG/DL (ref 8.7–10.5)
CHLORIDE SERPL-SCNC: 100 MMOL/L (ref 95–110)
CO2 SERPL-SCNC: 27 MMOL/L (ref 23–29)
CREAT SERPL-MCNC: 0.7 MG/DL (ref 0.5–1.4)
DACRYOCYTES BLD QL SMEAR: ABNORMAL
DIFFERENTIAL METHOD: ABNORMAL
EOSINOPHIL # BLD AUTO: 0.1 K/UL (ref 0–0.5)
EOSINOPHIL NFR BLD: 0.2 % (ref 0–8)
ERYTHROCYTE [DISTWIDTH] IN BLOOD BY AUTOMATED COUNT: 13.4 % (ref 11.5–14.5)
EST. GFR  (AFRICAN AMERICAN): >60 ML/MIN/1.73 M^2
EST. GFR  (NON AFRICAN AMERICAN): >60 ML/MIN/1.73 M^2
GLUCOSE SERPL-MCNC: 139 MG/DL (ref 70–110)
HCT VFR BLD AUTO: 34.8 % (ref 40–54)
HGB BLD-MCNC: 11.3 G/DL (ref 14–18)
IMM GRANULOCYTES # BLD AUTO: 0.26 K/UL (ref 0–0.04)
IMM GRANULOCYTES NFR BLD AUTO: 0.9 % (ref 0–0.5)
LYMPHOCYTES # BLD AUTO: 1 K/UL (ref 1–4.8)
LYMPHOCYTES NFR BLD: 3.2 % (ref 18–48)
MCH RBC QN AUTO: 28.4 PG (ref 27–31)
MCHC RBC AUTO-ENTMCNC: 32.5 G/DL (ref 32–36)
MCV RBC AUTO: 87 FL (ref 82–98)
MONOCYTES # BLD AUTO: 1.1 K/UL (ref 0.3–1)
MONOCYTES NFR BLD: 3.5 % (ref 4–15)
NEUTROPHILS # BLD AUTO: 28 K/UL (ref 1.8–7.7)
NEUTROPHILS NFR BLD: 92 % (ref 38–73)
NRBC BLD-RTO: 0 /100 WBC
OVALOCYTES BLD QL SMEAR: ABNORMAL
PLATELET # BLD AUTO: 414 K/UL (ref 150–350)
PLATELET BLD QL SMEAR: ABNORMAL
PMV BLD AUTO: 9.9 FL (ref 9.2–12.9)
POIKILOCYTOSIS BLD QL SMEAR: SLIGHT
POTASSIUM SERPL-SCNC: 4.2 MMOL/L (ref 3.5–5.1)
PROT SERPL-MCNC: 7.2 G/DL (ref 6–8.4)
RBC # BLD AUTO: 3.98 M/UL (ref 4.6–6.2)
SODIUM SERPL-SCNC: 138 MMOL/L (ref 136–145)
VANCOMYCIN TROUGH SERPL-MCNC: 3.6 UG/ML (ref 10–22)
WBC # BLD AUTO: 30.35 K/UL (ref 3.9–12.7)
WBC TOXIC VACUOLES BLD QL SMEAR: PRESENT

## 2020-05-22 PROCEDURE — G0378 HOSPITAL OBSERVATION PER HR: HCPCS

## 2020-05-22 PROCEDURE — 85025 COMPLETE CBC W/AUTO DIFF WBC: CPT

## 2020-05-22 PROCEDURE — 80202 ASSAY OF VANCOMYCIN: CPT

## 2020-05-22 PROCEDURE — 63600175 PHARM REV CODE 636 W HCPCS: Performed by: NURSE PRACTITIONER

## 2020-05-22 PROCEDURE — 25000003 PHARM REV CODE 250: Performed by: PHYSICIAN ASSISTANT

## 2020-05-22 PROCEDURE — 63600175 PHARM REV CODE 636 W HCPCS: Performed by: FAMILY MEDICINE

## 2020-05-22 PROCEDURE — 36415 COLL VENOUS BLD VENIPUNCTURE: CPT

## 2020-05-22 PROCEDURE — 80053 COMPREHEN METABOLIC PANEL: CPT

## 2020-05-22 PROCEDURE — 25000003 PHARM REV CODE 250: Performed by: FAMILY MEDICINE

## 2020-05-22 PROCEDURE — 63600175 PHARM REV CODE 636 W HCPCS: Performed by: PHYSICIAN ASSISTANT

## 2020-05-22 PROCEDURE — 25000003 PHARM REV CODE 250: Performed by: NURSE PRACTITIONER

## 2020-05-22 RX ORDER — ONDANSETRON 4 MG/1
4 TABLET, FILM COATED ORAL EVERY 6 HOURS PRN
Status: DISCONTINUED | OUTPATIENT
Start: 2020-05-22 | End: 2020-05-26 | Stop reason: HOSPADM

## 2020-05-22 RX ORDER — MAG HYDROX/ALUMINUM HYD/SIMETH 200-200-20
30 SUSPENSION, ORAL (FINAL DOSE FORM) ORAL EVERY 6 HOURS PRN
Status: DISCONTINUED | OUTPATIENT
Start: 2020-05-22 | End: 2020-05-26 | Stop reason: HOSPADM

## 2020-05-22 RX ORDER — TALC
6 POWDER (GRAM) TOPICAL NIGHTLY PRN
Status: DISCONTINUED | OUTPATIENT
Start: 2020-05-22 | End: 2020-05-26 | Stop reason: HOSPADM

## 2020-05-22 RX ORDER — HYDRALAZINE HYDROCHLORIDE 20 MG/ML
10 INJECTION INTRAMUSCULAR; INTRAVENOUS EVERY 6 HOURS PRN
Status: DISCONTINUED | OUTPATIENT
Start: 2020-05-22 | End: 2020-05-26 | Stop reason: HOSPADM

## 2020-05-22 RX ORDER — ACETAMINOPHEN 325 MG/1
650 TABLET ORAL EVERY 6 HOURS PRN
Status: DISCONTINUED | OUTPATIENT
Start: 2020-05-22 | End: 2020-05-26 | Stop reason: HOSPADM

## 2020-05-22 RX ORDER — GABAPENTIN 300 MG/1
300 CAPSULE ORAL 3 TIMES DAILY
Status: DISCONTINUED | OUTPATIENT
Start: 2020-05-22 | End: 2020-05-26 | Stop reason: HOSPADM

## 2020-05-22 RX ORDER — VANCOMYCIN HCL IN 5 % DEXTROSE 1G/250ML
1000 PLASTIC BAG, INJECTION (ML) INTRAVENOUS
Status: DISCONTINUED | OUTPATIENT
Start: 2020-05-22 | End: 2020-05-23

## 2020-05-22 RX ADMIN — VANCOMYCIN HYDROCHLORIDE 1000 MG: 1 INJECTION, POWDER, LYOPHILIZED, FOR SOLUTION INTRAVENOUS at 10:05

## 2020-05-22 RX ADMIN — TRAMADOL HYDROCHLORIDE 50 MG: 50 TABLET, FILM COATED ORAL at 12:05

## 2020-05-22 RX ADMIN — GABAPENTIN 300 MG: 300 CAPSULE ORAL at 04:05

## 2020-05-22 RX ADMIN — CEFEPIME HYDROCHLORIDE 1 G: 1 INJECTION, SOLUTION INTRAVENOUS at 04:05

## 2020-05-22 RX ADMIN — ACETAMINOPHEN 650 MG: 325 TABLET ORAL at 06:05

## 2020-05-22 RX ADMIN — TRAMADOL HYDROCHLORIDE 50 MG: 50 TABLET, FILM COATED ORAL at 06:05

## 2020-05-22 RX ADMIN — VANCOMYCIN HYDROCHLORIDE 1000 MG: 1 INJECTION, POWDER, LYOPHILIZED, FOR SOLUTION INTRAVENOUS at 02:05

## 2020-05-22 RX ADMIN — VANCOMYCIN HYDROCHLORIDE 1000 MG: 1 INJECTION, POWDER, LYOPHILIZED, FOR SOLUTION INTRAVENOUS at 06:05

## 2020-05-22 RX ADMIN — CEFEPIME HYDROCHLORIDE 1 G: 1 INJECTION, SOLUTION INTRAVENOUS at 08:05

## 2020-05-22 RX ADMIN — GABAPENTIN 300 MG: 300 CAPSULE ORAL at 08:05

## 2020-05-22 RX ADMIN — ALUMINUM HYDROXIDE, MAGNESIUM HYDROXIDE, AND SIMETHICONE 30 ML: 200; 200; 20 SUSPENSION ORAL at 07:05

## 2020-05-22 RX ADMIN — SODIUM CHLORIDE: 0.9 INJECTION, SOLUTION INTRAVENOUS at 07:05

## 2020-05-22 RX ADMIN — Medication 6 MG: at 08:05

## 2020-05-22 NOTE — PLAN OF CARE
Patient remained free from injury. IVF and IV abx maintained. PRN pain meds managed pain. No s/s of acute distress. 12hr chart check complete.

## 2020-05-22 NOTE — ASSESSMENT & PLAN NOTE
2/2 left knee and ankle abscess   Blood cultures pending  Monitor for fever  IV Vanco and Cefepime  5/21/20  -WBCs trending downward.   -Blood cx remain negative   -Wound cx pending   5/22/20  - WBCs 30.35k up from 21.47k. Likely reactive/stress related. Monitor closely.   -Continue IV vancomycin/cefepime.   -Blood cx remain negative and pt afebrile.

## 2020-05-22 NOTE — ASSESSMENT & PLAN NOTE
Fluctuance abscess to left medial ankle noted on exam  CT ankle pending  NPO after MN  Podiatry consult for I & D of left ankle abscess -  Spoke with Dr. Dior who states she does not perform procedures on the ankle area - will need to consult Orthopedics  Vanco and Cefepime  Prn analgesia  5/21/20  -S/p incision and drainage left ankle medial malleolus by Dr. Ramirez. Patient tolerated well.   -Pain controlled    5/2/20  -Wound care   -Follow cultures   -IV vanc/cefepime

## 2020-05-22 NOTE — ASSESSMENT & PLAN NOTE
Wound care consult for wound VAC monitoring  Awaiting cultures results for Antibiotic therapy  -current empiric therapy per HM team  WBC trending up today

## 2020-05-22 NOTE — PLAN OF CARE
Plan of care reviewed and discussed with patient.  No acute distress noted.  Wound vac therapy maintained.  Pain managed adequately.  IV hydration maintained.  Safety and fall precautions in place.  Will continue to monitor.

## 2020-05-22 NOTE — PROGRESS NOTES
"[facility] Hospital Medicine  Progress Note    Patient Name: David Mohr  MRN: [id number]  Patient Class: OP- Observation   Admission Date: 5/20/2020  Length of Stay: 1 days  Attending Physician: Leonardo Osman MD  Primary Care Provider: No primary care provider on file.        Subjective:     Principal Problem:Sepsis        HPI:  Pt is a 26 yo male with PMHx of Cerebral Palsy, poly substance abuse, depression, anxiety and seizure disorder who presents to the ED with reports of swelling to the left anterior knee for "a few days."   Pt states he developed another abscess to the left anterior knee and tried to open with a needle. He developed increasing pain and redness to the left leg therefore, presented for evaluation. Temp max 100.0. Also, pt describes an abscess to the left medial ankle but cannot give a time frame.   Vital signs note normal temp, pulse 123, resp 20, B/P 123/71 and pulse ox 97%.  Knee xray - prominent soft tissue swelling anterior to the proximal tibia, hematoma versus fluid collection versus focal edema.   ED provider performed I & D to left anterior knee, packing is present and ACE wrap noted. Pt is admitted due to SIRS, left lower leg cellulitis and left medial ankle abscess.     Overview/Hospital Course:  25 year old male admitted for left knee and ankle abscess. Patient being treated with IV vancomycin/cefepime. Orthopedics consulted. As of 5/21/20 pt s/p incision and drainage of left leg pretibial and left ankle medial malleolus abscess and wound vac placement by Dr. Ramirez. Patient tolerated well. WBCs trending downward. Continue IV abx therapy. As of 5/22 POD #1, pain controlled.  WBCs 30.35k up from 21.47k. Likely reactive/stress related. Monitor closely. Continue IV vancomycin/cefepime. Blood cx remain negative and pt afebrile.  Wound cx pending.     Interval History:  POD #1, pain controlled.  WBCs 30.35k up from 21.47k. Likely reactive/stress related. Monitor " closely. Continue IV vancomycin/cefepime. Blood cx remain negative and pt afebrile.        Review of Systems   Constitutional: Positive for fever.   HENT: Negative.    Eyes: Negative for redness.   Respiratory: Negative for cough and shortness of breath.    Cardiovascular: Positive for leg swelling. Negative for chest pain and palpitations.   Gastrointestinal: Negative.    Musculoskeletal: Positive for arthralgias and gait problem.        Wheelchair bound   Skin: Positive for color change and wound.   Neurological: Positive for weakness.   Psychiatric/Behavioral: The patient is nervous/anxious.      Objective:     Vital Signs (Most Recent):  Temp: 98.4 °F (36.9 °C) (05/22/20 1229)  Pulse: 94 (05/22/20 1229)  Resp: 18 (05/22/20 1229)  BP: 139/74 (05/22/20 1229)  SpO2: 95 % (05/22/20 1229) Vital Signs (24h Range):  Temp:  [97.7 °F (36.5 °C)-98.6 °F (37 °C)] 98.4 °F (36.9 °C)  Pulse:  [] 94  Resp:  [18-20] 18  SpO2:  [95 %-98 %] 95 %  BP: (122-139)/(69-74) 139/74     Weight: 73.4 kg (161 lb 12.8 oz)  Body mass index is 24.6 kg/m².    Intake/Output Summary (Last 24 hours) at 5/22/2020 1423  Last data filed at 5/22/2020 0854  Gross per 24 hour   Intake 780 ml   Output 2120 ml   Net -1340 ml      Physical Exam   Constitutional: He is oriented to person, place, and time. He appears well-developed and well-nourished.   HENT:   Head: Normocephalic and atraumatic.   Nose: Nose normal.   Mouth/Throat: Oropharynx is clear and moist.   Eyes: Conjunctivae are normal. No scleral icterus.   Neck: Normal range of motion. Neck supple.   Cardiovascular: Normal rate, regular rhythm and normal heart sounds. Exam reveals no gallop and no friction rub.   No murmur heard.  Pulmonary/Chest: Effort normal and breath sounds normal.   Abdominal: Soft. Bowel sounds are normal.   Musculoskeletal: Normal range of motion. He exhibits no edema or tenderness.   Cerebral palsy has affected the bilateral legs - there is limited ROM      Neurological: He is alert and oriented to person, place, and time.   Skin: Skin is warm and dry.        Scattered scabbing wounds to various stages on arms and legs    LLE with erythema and swelling.   Psychiatric: He has a normal mood and affect. His behavior is normal.   Vitals reviewed.      Significant Labs:   Blood Culture: No results for input(s): LABBLOO in the last 48 hours.  BMP:   Recent Labs   Lab 05/22/20  0157   *      K 4.2      CO2 27   BUN 7   CREATININE 0.7   CALCIUM 9.2     CBC:   Recent Labs   Lab 05/21/20  0736 05/22/20  0157   WBC 21.47* 30.35*   HGB 10.7* 11.3*   HCT 33.4* 34.8*    414*     CMP:   Recent Labs   Lab 05/22/20  0157      K 4.2      CO2 27   *   BUN 7   CREATININE 0.7   CALCIUM 9.2   PROT 7.2   ALBUMIN 2.3*   BILITOT 0.3   ALKPHOS 96   AST 19   ALT 23   ANIONGAP 11   EGFRNONAA >60       Significant Imaging: I have reviewed all pertinent imaging results/findings within the past 24 hours.      Assessment/Plan:      * Sepsis  2/2 left knee and ankle abscess   Blood cultures pending  Monitor for fever  IV Vanco and Cefepime  5/21/20  -WBCs trending downward.   -Blood cx remain negative   -Wound cx pending   5/22/20  - WBCs 30.35k up from 21.47k. Likely reactive/stress related. Monitor closely.   -Continue IV vancomycin/cefepime.   -Blood cx remain negative and pt afebrile.            Abscess of left knee  -S/p incision and drainage of left leg pretibial and left ankle medial malleolus abscess and wound vac placement by Dr. Ramirez. Patient tolerated well.   -Pain control   5/22/20  -Wound care consult   -Social work consult for discharge planning         Polysubstance abuse  Pt reports THC use  Encouraged cessation      Cerebral palsy  Outpatient follow up      Cellulitis of left lower extremity  IV Vancomycin and Cefepime  Elevate extremity  Prn analgesia  CT if left knee to evaluate for abscess - ED did perform I & D to left anterior knee  - no fluctuance noted  Ortho consult  5/21/20  -Continue IV abx   5/22/20  -Improving     Abscess of ankle  Fluctuance abscess to left medial ankle noted on exam  CT ankle pending  NPO after MN  Podiatry consult for I & D of left ankle abscess -  Spoke with Dr. Dior who states she does not perform procedures on the ankle area - will need to consult Orthopedics  Vanco and Cefepime  Prn analgesia  5/21/20  -S/p incision and drainage left ankle medial malleolus by Dr. Ramirez. Patient tolerated well.   -Pain controlled    5/2/20  -Wound care   -Follow cultures   -IV vanc/cefepime       VTE Risk Mitigation (From admission, onward)         Ordered     Place sequential compression device  Until discontinued      05/20/20 5440                      Mery Acosta NP  Department of Hospital Medicine   [facility]

## 2020-05-22 NOTE — ASSESSMENT & PLAN NOTE
Dressing reinforcement PRN  Will need iodoform gauze removed POD 2  Will follow cultures for final Antibiotic therapy  -continue empiric therapy per HM team  WBC trending up today

## 2020-05-22 NOTE — ASSESSMENT & PLAN NOTE
IV Vancomycin and Cefepime  Elevate extremity  Prn analgesia  CT if left knee to evaluate for abscess - ED did perform I & D to left anterior knee - no fluctuance noted  Ortho consult  5/21/20  -Continue IV abx   5/22/20  -Improving

## 2020-05-22 NOTE — SUBJECTIVE & OBJECTIVE
"Principal Problem:Abscess of ankle    Principal Orthopedic Problem: Abscesses LLE    Interval History: POD 1 s/p I&D of left knee and left ankle. States pain controlled. Wound vac working appropriately.     Review of patient's allergies indicates:   Allergen Reactions    Aspirin Anaphylaxis       Current Facility-Administered Medications   Medication    0.9%  NaCl infusion    acetaminophen suppository 650 mg    cefepime in dextrose 5 % 1 gram/50 mL IVPB 1 g    nozaseptin (NOZIN) nasal     traMADoL tablet 50 mg    vancomycin in dextrose 5 % 1 gram/250 mL IVPB 1,000 mg     Objective:     Vital Signs (Most Recent):  Temp: 97.7 °F (36.5 °C) (05/22/20 0731)  Pulse: (!) 111 (05/22/20 0731)  Resp: 18 (05/22/20 0731)  BP: 135/71 (05/22/20 0731)  SpO2: 96 % (05/22/20 0731) Vital Signs (24h Range):  Temp:  [97.7 °F (36.5 °C)-100.2 °F (37.9 °C)] 97.7 °F (36.5 °C)  Pulse:  [] 111  Resp:  [16-47] 18  SpO2:  [92 %-100 %] 96 %  BP: (122-159)/(58-77) 135/71     Weight: 73.4 kg (161 lb 12.8 oz)  Height: 5' 8" (172.7 cm)  Body mass index is 24.6 kg/m².      Intake/Output Summary (Last 24 hours) at 5/22/2020 0915  Last data filed at 5/21/2020 2332  Gross per 24 hour   Intake 1760 ml   Output 3030 ml   Net -1270 ml       Ortho/SPM Exam   Wound VAC working appropriately  Dressing to left ankle CDI  NVI      Significant Labs:   CBC:   Recent Labs   Lab 05/20/20  1225 05/21/20  0736 05/22/20  0157   WBC 25.33* 21.47* 30.35*   HGB 12.4* 10.7* 11.3*   HCT 37.4* 33.4* 34.8*    328 414*     All pertinent labs within the past 24 hours have been reviewed.    Significant Imaging: I have reviewed all pertinent imaging results/findings.  "

## 2020-05-22 NOTE — ASSESSMENT & PLAN NOTE
-S/p incision and drainage of left leg pretibial and left ankle medial malleolus abscess and wound vac placement by Dr. Ramirez. Patient tolerated well.   -Pain control   5/22/20  -Wound care consult   -Social work consult for discharge planning

## 2020-05-22 NOTE — SUBJECTIVE & OBJECTIVE
Interval History:  POD #1, pain controlled.  WBCs 30.35k up from 21.47k. Likely reactive/stress related. Monitor closely. Continue IV vancomycin/cefepime. Blood cx remain negative and pt afebrile.        Review of Systems   Constitutional: Positive for fever.   HENT: Negative.    Eyes: Negative for redness.   Respiratory: Negative for cough and shortness of breath.    Cardiovascular: Positive for leg swelling. Negative for chest pain and palpitations.   Gastrointestinal: Negative.    Musculoskeletal: Positive for arthralgias and gait problem.        Wheelchair bound   Skin: Positive for color change and wound.   Neurological: Positive for weakness.   Psychiatric/Behavioral: The patient is nervous/anxious.      Objective:     Vital Signs (Most Recent):  Temp: 98.4 °F (36.9 °C) (05/22/20 1229)  Pulse: 94 (05/22/20 1229)  Resp: 18 (05/22/20 1229)  BP: 139/74 (05/22/20 1229)  SpO2: 95 % (05/22/20 1229) Vital Signs (24h Range):  Temp:  [97.7 °F (36.5 °C)-98.6 °F (37 °C)] 98.4 °F (36.9 °C)  Pulse:  [] 94  Resp:  [18-20] 18  SpO2:  [95 %-98 %] 95 %  BP: (122-139)/(69-74) 139/74     Weight: 73.4 kg (161 lb 12.8 oz)  Body mass index is 24.6 kg/m².    Intake/Output Summary (Last 24 hours) at 5/22/2020 1423  Last data filed at 5/22/2020 0854  Gross per 24 hour   Intake 780 ml   Output 2120 ml   Net -1340 ml      Physical Exam   Constitutional: He is oriented to person, place, and time. He appears well-developed and well-nourished.   HENT:   Head: Normocephalic and atraumatic.   Nose: Nose normal.   Mouth/Throat: Oropharynx is clear and moist.   Eyes: Conjunctivae are normal. No scleral icterus.   Neck: Normal range of motion. Neck supple.   Cardiovascular: Normal rate, regular rhythm and normal heart sounds. Exam reveals no gallop and no friction rub.   No murmur heard.  Pulmonary/Chest: Effort normal and breath sounds normal.   Abdominal: Soft. Bowel sounds are normal.   Musculoskeletal: Normal range of motion. He  exhibits no edema or tenderness.   Cerebral palsy has affected the bilateral legs - there is limited ROM     Neurological: He is alert and oriented to person, place, and time.   Skin: Skin is warm and dry.        Scattered scabbing wounds to various stages on arms and legs    LLE with erythema and swelling.   Psychiatric: He has a normal mood and affect. His behavior is normal.   Vitals reviewed.      Significant Labs:   Blood Culture: No results for input(s): LABBLOO in the last 48 hours.  BMP:   Recent Labs   Lab 05/22/20  0157   *      K 4.2      CO2 27   BUN 7   CREATININE 0.7   CALCIUM 9.2     CBC:   Recent Labs   Lab 05/21/20  0736 05/22/20  0157   WBC 21.47* 30.35*   HGB 10.7* 11.3*   HCT 33.4* 34.8*    414*     CMP:   Recent Labs   Lab 05/22/20  0157      K 4.2      CO2 27   *   BUN 7   CREATININE 0.7   CALCIUM 9.2   PROT 7.2   ALBUMIN 2.3*   BILITOT 0.3   ALKPHOS 96   AST 19   ALT 23   ANIONGAP 11   EGFRNONAA >60       Significant Imaging: I have reviewed all pertinent imaging results/findings within the past 24 hours.

## 2020-05-22 NOTE — PROGRESS NOTES
Pharmacokinetic Assessment Follow Up: IV Vancomycin    Vancomycin serum concentration assessment(s):    The trough level was drawn correctly and can be used to guide therapy at this time. The measurement is below the desired definitive target range of 10 to 15 mcg/mL.    Vancomycin Regimen Plan:    Change regimen to Vancomycin 1000 mg IV every 8 hours with next serum trough concentration measured at 02:00 prior to 3rd dose on 5/23    Drug levels (last 3 results):  Recent Labs   Lab Result Units 05/22/20  0157   Vancomycin-Trough ug/mL 3.6*       Pharmacy will continue to follow and monitor vancomycin.    Please contact pharmacy at extension [phone] for questions regarding this assessment.    Thank you for the consult,   Ji Khan       Patient brief summary:  David Mohr is a 25 y.o. male initiated on antimicrobial therapy with IV Vancomycin for treatment of skin & soft tissue infection      Drug Allergies:   Review of patient's allergies indicates:   Allergen Reactions    Aspirin Anaphylaxis       Actual Body Weight:   73.4kg    Renal Function:   Estimated Creatinine Clearance: 154.7 mL/min (based on SCr of 0.7 mg/dL).,     Dialysis Method (if applicable):  N/A    CBC (last 72 hours):  Recent Labs   Lab Result Units 05/20/20  1225 05/21/20  0736 05/22/20  0157   WBC K/uL 25.33* 21.47* 30.35*   Hemoglobin g/dL 12.4* 10.7* 11.3*   Hematocrit % 37.4* 33.4* 34.8*   Platelets K/uL 337 328 414*   Gran% % 88.4* 87.0* 92.0*   Lymph% % 3.2* 4.9* 3.2*   Mono% % 7.3 7.1 3.5*   Eosinophil% % 0.0 0.1 0.2   Basophil% % 0.3 0.2 0.2   Differential Method  Automated Automated Automated       Metabolic Panel (last 72 hours):  Recent Labs   Lab Result Units 05/20/20  1225 05/20/20  1231 05/22/20  0157   Sodium mmol/L 134*  --  138   Potassium mmol/L 4.2  --  4.2   Chloride mmol/L 96  --  100   CO2 mmol/L 27  --  27   Glucose mg/dL 126*  --  139*   Glucose, UA   --  Negative  --    BUN, Bld mg/dL 12  --  7   Creatinine  mg/dL 0.8  --  0.7   Albumin g/dL 2.8*  --  2.3*   Total Bilirubin mg/dL 0.5  --  0.3   Alkaline Phosphatase U/L 82  --  96   AST U/L 23  --  19   ALT U/L 21  --  23       Vancomycin Administrations:  vancomycin given in the last 96 hours                   vancomycin in dextrose 5 % 1 gram/250 mL IVPB 1,000 mg (mg) 1,000 mg New Bag 05/22/20 0222     1,000 mg New Bag 05/21/20 1305     1,000 mg New Bag  0132    vancomycin 1.75 g in 5 % dextrose 500 mL IVPB (mg) 1,750 mg New Bag 05/20/20 1342                Microbiologic Results:  Microbiology Results (last 7 days)     Procedure Component Value Units Date/Time    Blood culture x two cultures. Draw prior to antibiotics. [[id number]] Collected:  05/20/20 1237    Order Status:  Completed Specimen:  Blood from Peripheral, Hand, Right Updated:  05/21/20 2212     Blood Culture, Routine No Growth to date      No Growth to date    Narrative:       Aerobic and anaerobic    Culture, Anaerobe [[id number]] Collected:  05/21/20 1107    Order Status:  Sent Specimen:  Abscess from Leg, Left Updated:  05/21/20 2031    Aerobic culture [[id number]] Collected:  05/21/20 1107    Order Status:  Sent Specimen:  Abscess from Leg, Left Updated:  05/21/20 2031    Blood culture x two cultures. Draw prior to antibiotics. [[id number]] Collected:  05/20/20 1225    Order Status:  Completed Specimen:  Blood from Peripheral, Antecubital, Right Updated:  05/21/20 2022     Blood Culture, Routine No Growth to date      No Growth to date    Narrative:       Aerobic and anaerobic

## 2020-05-22 NOTE — PROGRESS NOTES
"[facility]  Orthopedics  Progress Note    Patient Name: David Mohr  MRN: [id number]  Admission Date: 5/20/2020  Hospital Length of Stay: 1 days  Attending Provider: Leonardo Osman MD  Primary Care Provider: No primary care provider on file.  Follow-up For: Procedure(s) (LRB):  INCISION AND DRAINAGE, LOWER EXTREMITY (Left)  APPLICATION, WOUND VAC (Left)    Post-Operative Day: 1 Day Post-Op  Subjective:     Principal Problem:Abscess of ankle    Principal Orthopedic Problem: Abscesses LLE    Interval History: POD 1 s/p I&D of left knee and left ankle. States pain controlled. Wound vac working appropriately.     Review of patient's allergies indicates:   Allergen Reactions    Aspirin Anaphylaxis       Current Facility-Administered Medications   Medication    0.9%  NaCl infusion    acetaminophen suppository 650 mg    cefepime in dextrose 5 % 1 gram/50 mL IVPB 1 g    nozaseptin (NOZIN) nasal     traMADoL tablet 50 mg    vancomycin in dextrose 5 % 1 gram/250 mL IVPB 1,000 mg     Objective:     Vital Signs (Most Recent):  Temp: 97.7 °F (36.5 °C) (05/22/20 0731)  Pulse: (!) 111 (05/22/20 0731)  Resp: 18 (05/22/20 0731)  BP: 135/71 (05/22/20 0731)  SpO2: 96 % (05/22/20 0731) Vital Signs (24h Range):  Temp:  [97.7 °F (36.5 °C)-100.2 °F (37.9 °C)] 97.7 °F (36.5 °C)  Pulse:  [] 111  Resp:  [16-47] 18  SpO2:  [92 %-100 %] 96 %  BP: (122-159)/(58-77) 135/71     Weight: 73.4 kg (161 lb 12.8 oz)  Height: 5' 8" (172.7 cm)  Body mass index is 24.6 kg/m².      Intake/Output Summary (Last 24 hours) at 5/22/2020 0974  Last data filed at 5/21/2020 2332  Gross per 24 hour   Intake 1760 ml   Output 3030 ml   Net -1270 ml       Ortho/SPM Exam   Wound VAC working appropriately  Dressing to left ankle CDI  NVI      Significant Labs:   CBC:   Recent Labs   Lab 05/20/20  1225 05/21/20  0736 05/22/20  0157   WBC 25.33* 21.47* 30.35*   HGB 12.4* 10.7* 11.3*   HCT 37.4* 33.4* 34.8*    328 414* "     All pertinent labs within the past 24 hours have been reviewed.    Significant Imaging: I have reviewed all pertinent imaging results/findings.    Assessment/Plan:     * Abscess of ankle  Dressing reinforcement PRN  Will need iodoform gauze removed POD 2  Will follow cultures for final Antibiotic therapy  -continue empiric therapy per  team  WBC trending up today    Abscess of left knee  Wound care consult for wound VAC monitoring  Awaiting cultures results for Antibiotic therapy  -current empiric therapy per HM team  WBC trending up today            José Lance PA-C  Orthopedics  [facility]

## 2020-05-23 LAB
ALBUMIN SERPL BCP-MCNC: 2.1 G/DL (ref 3.5–5.2)
ALP SERPL-CCNC: 65 U/L (ref 55–135)
ALT SERPL W/O P-5'-P-CCNC: 47 U/L (ref 10–44)
ANION GAP SERPL CALC-SCNC: 7 MMOL/L (ref 8–16)
AST SERPL-CCNC: 33 U/L (ref 10–40)
BASOPHILS # BLD AUTO: 0.11 K/UL (ref 0–0.2)
BASOPHILS NFR BLD: 0.7 % (ref 0–1.9)
BILIRUB SERPL-MCNC: 0.2 MG/DL (ref 0.1–1)
BUN SERPL-MCNC: 8 MG/DL (ref 6–20)
CALCIUM SERPL-MCNC: 8.6 MG/DL (ref 8.7–10.5)
CHLORIDE SERPL-SCNC: 100 MMOL/L (ref 95–110)
CO2 SERPL-SCNC: 30 MMOL/L (ref 23–29)
CREAT SERPL-MCNC: 0.7 MG/DL (ref 0.5–1.4)
DIFFERENTIAL METHOD: ABNORMAL
EOSINOPHIL # BLD AUTO: 0.1 K/UL (ref 0–0.5)
EOSINOPHIL NFR BLD: 0.3 % (ref 0–8)
ERYTHROCYTE [DISTWIDTH] IN BLOOD BY AUTOMATED COUNT: 13.7 % (ref 11.5–14.5)
EST. GFR  (AFRICAN AMERICAN): >60 ML/MIN/1.73 M^2
EST. GFR  (NON AFRICAN AMERICAN): >60 ML/MIN/1.73 M^2
GLUCOSE SERPL-MCNC: 126 MG/DL (ref 70–110)
HCT VFR BLD AUTO: 32 % (ref 40–54)
HGB BLD-MCNC: 10 G/DL (ref 14–18)
IMM GRANULOCYTES # BLD AUTO: 0.29 K/UL (ref 0–0.04)
IMM GRANULOCYTES NFR BLD AUTO: 1.8 % (ref 0–0.5)
LYMPHOCYTES # BLD AUTO: 2.8 K/UL (ref 1–4.8)
LYMPHOCYTES NFR BLD: 16.8 % (ref 18–48)
MCH RBC QN AUTO: 28 PG (ref 27–31)
MCHC RBC AUTO-ENTMCNC: 31.3 G/DL (ref 32–36)
MCV RBC AUTO: 90 FL (ref 82–98)
MONOCYTES # BLD AUTO: 1.1 K/UL (ref 0.3–1)
MONOCYTES NFR BLD: 6.5 % (ref 4–15)
NEUTROPHILS # BLD AUTO: 12.1 K/UL (ref 1.8–7.7)
NEUTROPHILS NFR BLD: 73.9 % (ref 38–73)
NRBC BLD-RTO: 0 /100 WBC
PLATELET # BLD AUTO: 377 K/UL (ref 150–350)
PMV BLD AUTO: 10.7 FL (ref 9.2–12.9)
POTASSIUM SERPL-SCNC: 3.9 MMOL/L (ref 3.5–5.1)
PROT SERPL-MCNC: 6.5 G/DL (ref 6–8.4)
RBC # BLD AUTO: 3.57 M/UL (ref 4.6–6.2)
SODIUM SERPL-SCNC: 137 MMOL/L (ref 136–145)
VANCOMYCIN TROUGH SERPL-MCNC: 10.5 UG/ML (ref 10–22)
WBC # BLD AUTO: 16.36 K/UL (ref 3.9–12.7)

## 2020-05-23 PROCEDURE — 80202 ASSAY OF VANCOMYCIN: CPT

## 2020-05-23 PROCEDURE — 25000003 PHARM REV CODE 250: Performed by: PHYSICIAN ASSISTANT

## 2020-05-23 PROCEDURE — 63600175 PHARM REV CODE 636 W HCPCS: Performed by: FAMILY MEDICINE

## 2020-05-23 PROCEDURE — 85025 COMPLETE CBC W/AUTO DIFF WBC: CPT

## 2020-05-23 PROCEDURE — G0378 HOSPITAL OBSERVATION PER HR: HCPCS

## 2020-05-23 PROCEDURE — 25000003 PHARM REV CODE 250: Performed by: INTERNAL MEDICINE

## 2020-05-23 PROCEDURE — 63600175 PHARM REV CODE 636 W HCPCS: Performed by: INTERNAL MEDICINE

## 2020-05-23 PROCEDURE — 25000003 PHARM REV CODE 250: Performed by: FAMILY MEDICINE

## 2020-05-23 PROCEDURE — 36415 COLL VENOUS BLD VENIPUNCTURE: CPT

## 2020-05-23 PROCEDURE — 63600175 PHARM REV CODE 636 W HCPCS: Performed by: PHYSICIAN ASSISTANT

## 2020-05-23 PROCEDURE — 25000003 PHARM REV CODE 250: Performed by: NURSE PRACTITIONER

## 2020-05-23 PROCEDURE — 80053 COMPREHEN METABOLIC PANEL: CPT

## 2020-05-23 RX ORDER — HYDROCODONE BITARTRATE AND ACETAMINOPHEN 5; 325 MG/1; MG/1
1 TABLET ORAL EVERY 4 HOURS PRN
Status: DISCONTINUED | OUTPATIENT
Start: 2020-05-23 | End: 2020-05-26 | Stop reason: HOSPADM

## 2020-05-23 RX ORDER — VANCOMYCIN HCL IN 5 % DEXTROSE 1G/250ML
1000 PLASTIC BAG, INJECTION (ML) INTRAVENOUS
Status: DISCONTINUED | OUTPATIENT
Start: 2020-05-23 | End: 2020-05-25

## 2020-05-23 RX ADMIN — ACETAMINOPHEN 650 MG: 325 TABLET ORAL at 04:05

## 2020-05-23 RX ADMIN — VANCOMYCIN HYDROCHLORIDE 1000 MG: 1 INJECTION, POWDER, LYOPHILIZED, FOR SOLUTION INTRAVENOUS at 11:05

## 2020-05-23 RX ADMIN — GABAPENTIN 300 MG: 300 CAPSULE ORAL at 02:05

## 2020-05-23 RX ADMIN — SODIUM CHLORIDE: 0.9 INJECTION, SOLUTION INTRAVENOUS at 11:05

## 2020-05-23 RX ADMIN — VANCOMYCIN HYDROCHLORIDE 1000 MG: 1 INJECTION, POWDER, LYOPHILIZED, FOR SOLUTION INTRAVENOUS at 07:05

## 2020-05-23 RX ADMIN — CEFEPIME HYDROCHLORIDE 1 G: 1 INJECTION, SOLUTION INTRAVENOUS at 11:05

## 2020-05-23 RX ADMIN — CEFEPIME HYDROCHLORIDE 1 G: 1 INJECTION, SOLUTION INTRAVENOUS at 12:05

## 2020-05-23 RX ADMIN — TRAMADOL HYDROCHLORIDE 50 MG: 50 TABLET, FILM COATED ORAL at 06:05

## 2020-05-23 RX ADMIN — SODIUM CHLORIDE: 0.9 INJECTION, SOLUTION INTRAVENOUS at 08:05

## 2020-05-23 RX ADMIN — TRAMADOL HYDROCHLORIDE 50 MG: 50 TABLET, FILM COATED ORAL at 01:05

## 2020-05-23 RX ADMIN — TRAMADOL HYDROCHLORIDE 50 MG: 50 TABLET, FILM COATED ORAL at 08:05

## 2020-05-23 RX ADMIN — GABAPENTIN 300 MG: 300 CAPSULE ORAL at 08:05

## 2020-05-23 RX ADMIN — TRAMADOL HYDROCHLORIDE 50 MG: 50 TABLET, FILM COATED ORAL at 12:05

## 2020-05-23 RX ADMIN — HYDROCODONE BITARTRATE AND ACETAMINOPHEN 1 TABLET: 5; 325 TABLET ORAL at 09:05

## 2020-05-23 RX ADMIN — ONDANSETRON HYDROCHLORIDE 4 MG: 4 TABLET, FILM COATED ORAL at 08:05

## 2020-05-23 RX ADMIN — CEFEPIME HYDROCHLORIDE 1 G: 1 INJECTION, SOLUTION INTRAVENOUS at 04:05

## 2020-05-23 RX ADMIN — ACETAMINOPHEN 650 MG: 325 TABLET ORAL at 09:05

## 2020-05-23 RX ADMIN — CEFEPIME HYDROCHLORIDE 1 G: 1 INJECTION, SOLUTION INTRAVENOUS at 08:05

## 2020-05-23 RX ADMIN — Medication 6 MG: at 08:05

## 2020-05-23 RX ADMIN — ALUMINUM HYDROXIDE, MAGNESIUM HYDROXIDE, AND SIMETHICONE 30 ML: 200; 200; 20 SUSPENSION ORAL at 09:05

## 2020-05-23 RX ADMIN — VANCOMYCIN HYDROCHLORIDE 1000 MG: 1 INJECTION, POWDER, LYOPHILIZED, FOR SOLUTION INTRAVENOUS at 03:05

## 2020-05-23 RX ADMIN — ONDANSETRON HYDROCHLORIDE 4 MG: 4 TABLET, FILM COATED ORAL at 01:05

## 2020-05-23 NOTE — SUBJECTIVE & OBJECTIVE
Interval History: No acute events overnight. Pt lying in bed, pain controlled. WBCs 16.36k down from 30.35k. Social work consult to arrange for HH for wound care and VAC management.     Review of Systems   Constitutional: Positive for fever.   HENT: Negative.    Eyes: Negative for redness.   Respiratory: Negative for cough and shortness of breath.    Cardiovascular: Positive for leg swelling. Negative for chest pain and palpitations.   Gastrointestinal: Negative.    Musculoskeletal: Positive for arthralgias and gait problem.        Wheelchair bound   Skin: Positive for color change and wound.   Neurological: Positive for weakness.   Psychiatric/Behavioral: The patient is nervous/anxious.      Objective:     Vital Signs (Most Recent):  Temp: 98.2 °F (36.8 °C) (05/23/20 1234)  Pulse: 73 (05/23/20 1234)  Resp: 18 (05/23/20 1234)  BP: 129/63 (05/23/20 1234)  SpO2: 99 % (05/23/20 1234) Vital Signs (24h Range):  Temp:  [97.6 °F (36.4 °C)-98.7 °F (37.1 °C)] 98.2 °F (36.8 °C)  Pulse:  [73-91] 73  Resp:  [18] 18  SpO2:  [95 %-99 %] 99 %  BP: (128-137)/(57-85) 129/63     Weight: 73.4 kg (161 lb 12.8 oz)  Body mass index is 24.6 kg/m².    Intake/Output Summary (Last 24 hours) at 5/23/2020 1524  Last data filed at 5/23/2020 0609  Gross per 24 hour   Intake 2475 ml   Output 1850 ml   Net 625 ml      Physical Exam   Constitutional: He is oriented to person, place, and time. He appears well-developed and well-nourished.   HENT:   Head: Normocephalic and atraumatic.   Nose: Nose normal.   Mouth/Throat: Oropharynx is clear and moist.   Eyes: Conjunctivae are normal. No scleral icterus.   Neck: Normal range of motion. Neck supple.   Cardiovascular: Normal rate, regular rhythm and normal heart sounds. Exam reveals no gallop and no friction rub.   No murmur heard.  Pulmonary/Chest: Effort normal and breath sounds normal.   Abdominal: Soft. Bowel sounds are normal.   Musculoskeletal: Normal range of motion. He exhibits no edema or  tenderness.   Cerebral palsy has affected the bilateral legs - there is limited ROM     Neurological: He is alert and oriented to person, place, and time.   Skin: Skin is warm and dry.        Scattered scabbing wounds to various stages on arms and legs    LLE with erythema and swelling (improving)   Psychiatric: He has a normal mood and affect. His behavior is normal.   Vitals reviewed.      Significant Labs:   Blood Culture: No results for input(s): LABBLOO in the last 48 hours.  BMP:   Recent Labs   Lab 05/23/20 0427   *      K 3.9      CO2 30*   BUN 8   CREATININE 0.7   CALCIUM 8.6*     CBC:   Recent Labs   Lab 05/22/20 0157 05/23/20 0427   WBC 30.35* 16.36*   HGB 11.3* 10.0*   HCT 34.8* 32.0*   * 377*     CMP:   Recent Labs   Lab 05/22/20 0157 05/23/20 0427    137   K 4.2 3.9    100   CO2 27 30*   * 126*   BUN 7 8   CREATININE 0.7 0.7   CALCIUM 9.2 8.6*   PROT 7.2 6.5   ALBUMIN 2.3* 2.1*   BILITOT 0.3 0.2   ALKPHOS 96 65   AST 19 33   ALT 23 47*   ANIONGAP 11 7*   EGFRNONAA >60 >60     All pertinent labs within the past 24 hours have been reviewed.    Significant Imaging: I have reviewed all pertinent imaging results/findings within the past 24 hours.

## 2020-05-23 NOTE — PROGRESS NOTES
"[facility] Hospital Medicine  Progress Note    Patient Name: David Mohr  MRN: [id number]  Patient Class: OP- Observation   Admission Date: 5/20/2020  Length of Stay: 1 days  Attending Physician: Damir Lizarraga MD  Primary Care Provider: No primary care provider on file.        Subjective:     Principal Problem:Sepsis        HPI:  Pt is a 24 yo male with PMHx of Cerebral Palsy, poly substance abuse, depression, anxiety and seizure disorder who presents to the ED with reports of swelling to the left anterior knee for "a few days."   Pt states he developed another abscess to the left anterior knee and tried to open with a needle. He developed increasing pain and redness to the left leg therefore, presented for evaluation. Temp max 100.0. Also, pt describes an abscess to the left medial ankle but cannot give a time frame.   Vital signs note normal temp, pulse 123, resp 20, B/P 123/71 and pulse ox 97%.  Knee xray - prominent soft tissue swelling anterior to the proximal tibia, hematoma versus fluid collection versus focal edema.   ED provider performed I & D to left anterior knee, packing is present and ACE wrap noted. Pt is admitted due to SIRS, left lower leg cellulitis and left medial ankle abscess.     Overview/Hospital Course:  25 year old male admitted for left knee and ankle abscess. Patient being treated with IV vancomycin/cefepime. Orthopedics consulted. As of 5/21/20 pt s/p incision and drainage of left leg pretibial and left ankle medial malleolus abscess and wound vac placement by Dr. Ramirez. Patient tolerated well. WBCs trending downward. Continue IV abx therapy. As of 5/22 POD #1, pain controlled.  WBCs 30.35k up from 21.47k. Likely reactive/stress related. Monitor closely. Continue IV vancomycin/cefepime. Blood cx remain negative and pt afebrile.  Wound cx pending. As of 5/23 POD #2 pt lying in bed, pain controlled. WBCs 16.36k down from 30.35k. Social work consult to arrange for HH " for wound care and VAC management.     Interval History: No acute events overnight. Pt lying in bed, pain controlled. WBCs 16.36k down from 30.35k. Social work consult to arrange for HH for wound care and VAC management.     Review of Systems   Constitutional: Positive for fever.   HENT: Negative.    Eyes: Negative for redness.   Respiratory: Negative for cough and shortness of breath.    Cardiovascular: Positive for leg swelling. Negative for chest pain and palpitations.   Gastrointestinal: Negative.    Musculoskeletal: Positive for arthralgias and gait problem.        Wheelchair bound   Skin: Positive for color change and wound.   Neurological: Positive for weakness.   Psychiatric/Behavioral: The patient is nervous/anxious.      Objective:     Vital Signs (Most Recent):  Temp: 98.2 °F (36.8 °C) (05/23/20 1234)  Pulse: 73 (05/23/20 1234)  Resp: 18 (05/23/20 1234)  BP: 129/63 (05/23/20 1234)  SpO2: 99 % (05/23/20 1234) Vital Signs (24h Range):  Temp:  [97.6 °F (36.4 °C)-98.7 °F (37.1 °C)] 98.2 °F (36.8 °C)  Pulse:  [73-91] 73  Resp:  [18] 18  SpO2:  [95 %-99 %] 99 %  BP: (128-137)/(57-85) 129/63     Weight: 73.4 kg (161 lb 12.8 oz)  Body mass index is 24.6 kg/m².    Intake/Output Summary (Last 24 hours) at 5/23/2020 1524  Last data filed at 5/23/2020 0609  Gross per 24 hour   Intake 2475 ml   Output 1850 ml   Net 625 ml      Physical Exam   Constitutional: He is oriented to person, place, and time. He appears well-developed and well-nourished.   HENT:   Head: Normocephalic and atraumatic.   Nose: Nose normal.   Mouth/Throat: Oropharynx is clear and moist.   Eyes: Conjunctivae are normal. No scleral icterus.   Neck: Normal range of motion. Neck supple.   Cardiovascular: Normal rate, regular rhythm and normal heart sounds. Exam reveals no gallop and no friction rub.   No murmur heard.  Pulmonary/Chest: Effort normal and breath sounds normal.   Abdominal: Soft. Bowel sounds are normal.   Musculoskeletal: Normal range  of motion. He exhibits no edema or tenderness.   Cerebral palsy has affected the bilateral legs - there is limited ROM     Neurological: He is alert and oriented to person, place, and time.   Skin: Skin is warm and dry.        Scattered scabbing wounds to various stages on arms and legs    LLE with erythema and swelling (improving)   Psychiatric: He has a normal mood and affect. His behavior is normal.   Vitals reviewed.      Significant Labs:   Blood Culture: No results for input(s): LABBLOO in the last 48 hours.  BMP:   Recent Labs   Lab 05/23/20 0427   *      K 3.9      CO2 30*   BUN 8   CREATININE 0.7   CALCIUM 8.6*     CBC:   Recent Labs   Lab 05/22/20 0157 05/23/20 0427   WBC 30.35* 16.36*   HGB 11.3* 10.0*   HCT 34.8* 32.0*   * 377*     CMP:   Recent Labs   Lab 05/22/20 0157 05/23/20 0427    137   K 4.2 3.9    100   CO2 27 30*   * 126*   BUN 7 8   CREATININE 0.7 0.7   CALCIUM 9.2 8.6*   PROT 7.2 6.5   ALBUMIN 2.3* 2.1*   BILITOT 0.3 0.2   ALKPHOS 96 65   AST 19 33   ALT 23 47*   ANIONGAP 11 7*   EGFRNONAA >60 >60     All pertinent labs within the past 24 hours have been reviewed.    Significant Imaging: I have reviewed all pertinent imaging results/findings within the past 24 hours.      Assessment/Plan:      * Sepsis  2/2 left knee and ankle abscess   Blood cultures pending  Monitor for fever  IV Vanco and Cefepime  5/21/20  -WBCs trending downward.   -Blood cx remain negative   -Wound cx pending   5/22/20  - WBCs 30.35k up from 21.47k. Likely reactive/stress related. Monitor closely.   -Continue IV vancomycin/cefepime.   -Blood cx remain negative and pt afebrile.    5/23/20  -WBCs 16.36k down from 30.35k.   -Aerobic cx show STREPTOCOCCUS PYOGENES   -Susceptibility pending   -Continue abx           Abscess of left knee  -S/p incision and drainage of left leg pretibial and left ankle medial malleolus abscess and wound vac placement by Dr. Ramirez. Patient  tolerated well.   -Pain control   5/22/20  -Wound care consult   5/23/20  -WBCs 16.36k down from 30.35k.  - Social work consult to arrange for HH for wound care and VAC management.         Polysubstance abuse  Pt reports THC use  Encouraged cessation      Cerebral palsy  Outpatient follow up      Cellulitis of left lower extremity  IV Vancomycin and Cefepime  Elevate extremity  Prn analgesia  CT if left knee to evaluate for abscess - ED did perform I & D to left anterior knee - no fluctuance noted  Ortho consult  5/21/20  -Continue IV abx   5/22/20  -Improving   5/23/20  -Continues to improve     Abscess of ankle  Fluctuance abscess to left medial ankle noted on exam  CT ankle pending  NPO after MN  Podiatry consult for I & D of left ankle abscess -  Spoke with Dr. Dior who states she does not perform procedures on the ankle area - will need to consult Orthopedics  Vanco and Cefepime  Prn analgesia  5/21/20  -S/p incision and drainage left ankle medial malleolus by Dr. Ramirez. Patient tolerated well.   -Pain controlled    5/22/20  -Wound care   -Follow cultures   -IV vanc/cefepime   5/23/20  - Social work consult to arrange for HH for wound care and VAC management.   -Continue iv abx         VTE Risk Mitigation (From admission, onward)         Ordered     Place sequential compression device  Until discontinued      05/20/20 6110                      Mery Acosta NP  Department of Hospital Medicine   [facility]

## 2020-05-23 NOTE — PROGRESS NOTES
Wound vac order sheet and documentation faxed.  Requested delivery date 5/24/2020.  Spoke with pt does not have a HH preference.  Patient choice form completed and placed in chart.  HH referral sent to [facility] via [employer]    [facility] [phone]

## 2020-05-23 NOTE — ASSESSMENT & PLAN NOTE
Wound care consult for wound VAC monitoring  Awaiting cultures results for Antibiotic therapy  -current empiric therapy per HM team    Will need home care for wound care and VAC management

## 2020-05-23 NOTE — PLAN OF CARE
Patient received laying in bed awake, x4, in no acute distress or discomfort. Vitals present within stable limits. medication tolerated well. Wound vac and dressing to LLE remains clean and intact. Safety precautions maintained. Will cont. To monitor progress

## 2020-05-23 NOTE — PROGRESS NOTES
Pharmacokinetic Assessment Follow Up: IV Vancomycin    Vancomycin serum concentration assessment(s):    The trough level was drawn correctly and can be used to guide therapy at this time. The measurement is within the desired definitive target range of 10 to 15 mcg/mL.    Vancomycin Regimen Plan:    Continue regimen to Vancomycin 1000 mg IV every 8 hours with next serum trough concentration measured at 10:45 prior to 4th dose on 5/24    Drug levels (last 3 results):  Recent Labs   Lab Result Units 05/22/20  0157 05/23/20  0242   Vancomycin-Trough ug/mL 3.6* 10.5       Pharmacy will continue to follow and monitor vancomycin.    Please contact pharmacy at extension [phone] for questions regarding this assessment.    Thank you for the consult,   Ji Khan       Patient brief summary:  David Mohr is a 25 y.o. male initiated on antimicrobial therapy with IV Vancomycin for treatment of skin & soft tissue infection      Drug Allergies:   Review of patient's allergies indicates:   Allergen Reactions    Aspirin Anaphylaxis       Actual Body Weight:   73.4kg    Renal Function:   Estimated Creatinine Clearance: 154.7 mL/min (based on SCr of 0.7 mg/dL).,     Dialysis Method (if applicable):  N/A    CBC (last 72 hours):  Recent Labs   Lab Result Units 05/20/20  1225 05/21/20  0736 05/22/20  0157   WBC K/uL 25.33* 21.47* 30.35*   Hemoglobin g/dL 12.4* 10.7* 11.3*   Hematocrit % 37.4* 33.4* 34.8*   Platelets K/uL 337 328 414*   Gran% % 88.4* 87.0* 92.0*   Lymph% % 3.2* 4.9* 3.2*   Mono% % 7.3 7.1 3.5*   Eosinophil% % 0.0 0.1 0.2   Basophil% % 0.3 0.2 0.2   Differential Method  Automated Automated Automated       Metabolic Panel (last 72 hours):  Recent Labs   Lab Result Units 05/20/20  1225 05/20/20  1231 05/22/20  0157   Sodium mmol/L 134*  --  138   Potassium mmol/L 4.2  --  4.2   Chloride mmol/L 96  --  100   CO2 mmol/L 27  --  27   Glucose mg/dL 126*  --  139*   Glucose, UA   --  Negative  --    BUN, Bld mg/dL 12   --  7   Creatinine mg/dL 0.8  --  0.7   Albumin g/dL 2.8*  --  2.3*   Total Bilirubin mg/dL 0.5  --  0.3   Alkaline Phosphatase U/L 82  --  96   AST U/L 23  --  19   ALT U/L 21  --  23       Vancomycin Administrations:  vancomycin given in the last 96 hours                   vancomycin in dextrose 5 % 1 gram/250 mL IVPB 1,000 mg (mg) 1,000 mg New Bag 05/23/20 0317     1,000 mg New Bag 05/22/20 1806     1,000 mg New Bag  1010    vancomycin in dextrose 5 % 1 gram/250 mL IVPB 1,000 mg (mg) 1,000 mg New Bag 05/22/20 0222     1,000 mg New Bag 05/21/20 1305     1,000 mg New Bag  0132                Microbiologic Results:  Microbiology Results (last 7 days)     Procedure Component Value Units Date/Time    Blood culture x two cultures. Draw prior to antibiotics. [[id number]] Collected:  05/20/20 1237    Order Status:  Completed Specimen:  Blood from Peripheral, Hand, Right Updated:  05/22/20 2212     Blood Culture, Routine No Growth to date      No Growth to date      No Growth to date    Narrative:       Aerobic and anaerobic    Blood culture x two cultures. Draw prior to antibiotics. [[id number]] Collected:  05/20/20 1225    Order Status:  Completed Specimen:  Blood from Peripheral, Antecubital, Right Updated:  05/22/20 2022     Blood Culture, Routine No Growth to date      No Growth to date      No Growth to date    Narrative:       Aerobic and anaerobic    Culture, Anaerobe [[id number]] Collected:  05/21/20 1107    Order Status:  Sent Specimen:  Abscess from Leg, Left Updated:  05/21/20 2031    Aerobic culture [[id number]] Collected:  05/21/20 1107    Order Status:  Sent Specimen:  Abscess from Leg, Left Updated:  05/21/20 2031

## 2020-05-23 NOTE — ASSESSMENT & PLAN NOTE
-S/p incision and drainage of left leg pretibial and left ankle medial malleolus abscess and wound vac placement by Dr. Ramirez. Patient tolerated well.   -Pain control   5/22/20  -Wound care consult   5/23/20  -WBCs 16.36k down from 30.35k.  - Social work consult to arrange for HH for wound care and VAC management.

## 2020-05-23 NOTE — ASSESSMENT & PLAN NOTE
Dressing reinforcement PRN  iodoform gauze was removed today.  Will follow cultures for final Antibiotic therapy  -continue empiric therapy per HM team      Will need home health wound care for the ankle

## 2020-05-23 NOTE — PLAN OF CARE
05/23/20 1742   Post-Acute Status   Post-Acute Authorization Home Health   Home Health Status Referrals Sent

## 2020-05-23 NOTE — ASSESSMENT & PLAN NOTE
Fluctuance abscess to left medial ankle noted on exam  CT ankle pending  NPO after MN  Podiatry consult for I & D of left ankle abscess -  Spoke with Dr. Dior who states she does not perform procedures on the ankle area - will need to consult Orthopedics  Vanco and Cefepime  Prn analgesia  5/21/20  -S/p incision and drainage left ankle medial malleolus by Dr. Ramirez. Patient tolerated well.   -Pain controlled    5/22/20  -Wound care   -Follow cultures   -IV vanc/cefepime   5/23/20  - Social work consult to arrange for HH for wound care and VAC management.   -Continue iv abx

## 2020-05-23 NOTE — PLAN OF CARE
Remains free from injury. States relief of pain with available prn meds. Fluids running as ordered. Wound vac and dressing maintained. Vital signs stable. Chart reviewed. Will continue to monitor.

## 2020-05-23 NOTE — ASSESSMENT & PLAN NOTE
IV Vancomycin and Cefepime  Elevate extremity  Prn analgesia  CT if left knee to evaluate for abscess - ED did perform I & D to left anterior knee - no fluctuance noted  Ortho consult  5/21/20  -Continue IV abx   5/22/20  -Improving   5/23/20  -Continues to improve

## 2020-05-23 NOTE — ASSESSMENT & PLAN NOTE
2/2 left knee and ankle abscess   Blood cultures pending  Monitor for fever  IV Vanco and Cefepime  5/21/20  -WBCs trending downward.   -Blood cx remain negative   -Wound cx pending   5/22/20  - WBCs 30.35k up from 21.47k. Likely reactive/stress related. Monitor closely.   -Continue IV vancomycin/cefepime.   -Blood cx remain negative and pt afebrile.    5/23/20  -WBCs 16.36k down from 30.35k.   -Aerobic cx show STREPTOCOCCUS PYOGENES   -Susceptibility pending   -Continue abx

## 2020-05-23 NOTE — PROGRESS NOTES
"[facility]  Orthopedics  Progress Note    Patient Name: David Mohr  MRN: [id number]  Admission Date: 5/20/2020  Hospital Length of Stay: 1 days  Attending Provider: Damir Lizarraga MD  Primary Care Provider: No primary care provider on file.  Follow-up For: Procedure(s) (LRB):  INCISION AND DRAINAGE, LOWER EXTREMITY (Left)  APPLICATION, WOUND VAC (Left)    Post-Operative Day: 2 Days Post-Op  Subjective:     Principal Problem:Sepsis    Principal Orthopedic Problem: Abscesses LLE    Interval History: POD 2 s/p I&D of left knee and left ankle. States pain controlled. Wound vac working appropriately.  Discoloration of skin of the left ankle with retained surgical packing    Review of patient's allergies indicates:   Allergen Reactions    Aspirin Anaphylaxis       Current Facility-Administered Medications   Medication    0.9%  NaCl infusion    acetaminophen tablet 650 mg    aluminum-magnesium hydroxide-simethicone 200-200-20 mg/5 mL suspension 30 mL    cefepime in dextrose 5 % 1 gram/50 mL IVPB 1 g    gabapentin capsule 300 mg    hydrALAZINE injection 10 mg    melatonin tablet 6 mg    nozaseptin (NOZIN) nasal     ondansetron tablet 4 mg    traMADoL tablet 50 mg     Objective:     Vital Signs (Most Recent):  Temp: 98.2 °F (36.8 °C) (05/23/20 1234)  Pulse: 73 (05/23/20 1234)  Resp: 18 (05/23/20 1234)  BP: 129/63 (05/23/20 1234)  SpO2: 99 % (05/23/20 1234) Vital Signs (24h Range):  Temp:  [97.6 °F (36.4 °C)-98.7 °F (37.1 °C)] 98.2 °F (36.8 °C)  Pulse:  [73-91] 73  Resp:  [18] 18  SpO2:  [95 %-99 %] 99 %  BP: (128-137)/(57-85) 129/63     Weight: 73.4 kg (161 lb 12.8 oz)  Height: 5' 8" (172.7 cm)  Body mass index is 24.6 kg/m².      Intake/Output Summary (Last 24 hours) at 5/23/2020 1329  Last data filed at 5/23/2020 0609  Gross per 24 hour   Intake 2475 ml   Output 1850 ml   Net 625 ml       Ortho/SPM Exam     Wound VAC working appropriately  Dressing to left ankle " CDI  NVI      Significant Labs:   CBC:   Recent Labs   Lab 05/22/20  0157 05/23/20  0427   WBC 30.35* 16.36*   HGB 11.3* 10.0*   HCT 34.8* 32.0*   * 377*     All pertinent labs within the past 24 hours have been reviewed.    Significant Imaging: I have reviewed all pertinent imaging results/findings.    Assessment/Plan:     Abscess of left knee  Wound care consult for wound VAC monitoring  Awaiting cultures results for Antibiotic therapy  -current empiric therapy per  team    Will need home care for wound care and VAC management      Abscess of ankle  Dressing reinforcement PRN  iodoform gauze was removed today.  Will follow cultures for final Antibiotic therapy  -continue empiric therapy per  team      Will need home health wound care for the ankle          José Pinzon DO  Orthopedics  [facility]

## 2020-05-23 NOTE — SUBJECTIVE & OBJECTIVE
"Principal Problem:Sepsis    Principal Orthopedic Problem: Abscesses LLE    Interval History: POD 2 s/p I&D of left knee and left ankle. States pain controlled. Wound vac working appropriately.  Discoloration of skin of the left ankle with retained surgical packing    Review of patient's allergies indicates:   Allergen Reactions    Aspirin Anaphylaxis       Current Facility-Administered Medications   Medication    0.9%  NaCl infusion    acetaminophen tablet 650 mg    aluminum-magnesium hydroxide-simethicone 200-200-20 mg/5 mL suspension 30 mL    cefepime in dextrose 5 % 1 gram/50 mL IVPB 1 g    gabapentin capsule 300 mg    hydrALAZINE injection 10 mg    melatonin tablet 6 mg    nozaseptin (NOZIN) nasal     ondansetron tablet 4 mg    traMADoL tablet 50 mg     Objective:     Vital Signs (Most Recent):  Temp: 98.2 °F (36.8 °C) (05/23/20 1234)  Pulse: 73 (05/23/20 1234)  Resp: 18 (05/23/20 1234)  BP: 129/63 (05/23/20 1234)  SpO2: 99 % (05/23/20 1234) Vital Signs (24h Range):  Temp:  [97.6 °F (36.4 °C)-98.7 °F (37.1 °C)] 98.2 °F (36.8 °C)  Pulse:  [73-91] 73  Resp:  [18] 18  SpO2:  [95 %-99 %] 99 %  BP: (128-137)/(57-85) 129/63     Weight: 73.4 kg (161 lb 12.8 oz)  Height: 5' 8" (172.7 cm)  Body mass index is 24.6 kg/m².      Intake/Output Summary (Last 24 hours) at 5/23/2020 1329  Last data filed at 5/23/2020 0609  Gross per 24 hour   Intake 2475 ml   Output 1850 ml   Net 625 ml       Ortho/SPM Exam     Wound VAC working appropriately  Dressing to left ankle CDI  NVI      Significant Labs:   CBC:   Recent Labs   Lab 05/22/20  0157 05/23/20  0427   WBC 30.35* 16.36*   HGB 11.3* 10.0*   HCT 34.8* 32.0*   * 377*     All pertinent labs within the past 24 hours have been reviewed.    Significant Imaging: I have reviewed all pertinent imaging results/findings.  "

## 2020-05-23 NOTE — PROGRESS NOTES
Pharmacy Vancomycin Consult Note    Therapy with Vancomycin complete and/or consult discontinued by provider.  Pharmacy will sign off, please re-consult as needed.     Thank you for allowing us to participate in this patient's care.  Vicky Holland, Pharm D 5/23/2020 12:46 PM

## 2020-05-24 LAB
ALBUMIN SERPL BCP-MCNC: 2.4 G/DL (ref 3.5–5.2)
ALP SERPL-CCNC: 58 U/L (ref 55–135)
ALT SERPL W/O P-5'-P-CCNC: 36 U/L (ref 10–44)
ANION GAP SERPL CALC-SCNC: 4 MMOL/L (ref 8–16)
AST SERPL-CCNC: 16 U/L (ref 10–40)
BASOPHILS # BLD AUTO: 0.11 K/UL (ref 0–0.2)
BASOPHILS NFR BLD: 0.9 % (ref 0–1.9)
BILIRUB SERPL-MCNC: 0.3 MG/DL (ref 0.1–1)
BUN SERPL-MCNC: 5 MG/DL (ref 6–20)
CALCIUM SERPL-MCNC: 9.5 MG/DL (ref 8.7–10.5)
CHLORIDE SERPL-SCNC: 97 MMOL/L (ref 95–110)
CO2 SERPL-SCNC: 32 MMOL/L (ref 23–29)
CREAT SERPL-MCNC: 0.6 MG/DL (ref 0.5–1.4)
DIFFERENTIAL METHOD: ABNORMAL
EOSINOPHIL # BLD AUTO: 0.2 K/UL (ref 0–0.5)
EOSINOPHIL NFR BLD: 1.3 % (ref 0–8)
ERYTHROCYTE [DISTWIDTH] IN BLOOD BY AUTOMATED COUNT: 13.2 % (ref 11.5–14.5)
EST. GFR  (AFRICAN AMERICAN): >60 ML/MIN/1.73 M^2
EST. GFR  (NON AFRICAN AMERICAN): >60 ML/MIN/1.73 M^2
GLUCOSE SERPL-MCNC: 103 MG/DL (ref 70–110)
HCT VFR BLD AUTO: 35.4 % (ref 40–54)
HGB BLD-MCNC: 11.3 G/DL (ref 14–18)
IMM GRANULOCYTES # BLD AUTO: 0.54 K/UL (ref 0–0.04)
IMM GRANULOCYTES NFR BLD AUTO: 4.5 % (ref 0–0.5)
LYMPHOCYTES # BLD AUTO: 2.8 K/UL (ref 1–4.8)
LYMPHOCYTES NFR BLD: 23.3 % (ref 18–48)
MCH RBC QN AUTO: 28.2 PG (ref 27–31)
MCHC RBC AUTO-ENTMCNC: 31.9 G/DL (ref 32–36)
MCV RBC AUTO: 88 FL (ref 82–98)
MONOCYTES # BLD AUTO: 1 K/UL (ref 0.3–1)
MONOCYTES NFR BLD: 8.1 % (ref 4–15)
NEUTROPHILS # BLD AUTO: 7.5 K/UL (ref 1.8–7.7)
NEUTROPHILS NFR BLD: 61.9 % (ref 38–73)
NRBC BLD-RTO: 0 /100 WBC
PLATELET # BLD AUTO: 514 K/UL (ref 150–350)
PMV BLD AUTO: 9 FL (ref 9.2–12.9)
POTASSIUM SERPL-SCNC: 4.4 MMOL/L (ref 3.5–5.1)
PROT SERPL-MCNC: 7 G/DL (ref 6–8.4)
RBC # BLD AUTO: 4.01 M/UL (ref 4.6–6.2)
SODIUM SERPL-SCNC: 133 MMOL/L (ref 136–145)
VANCOMYCIN TROUGH SERPL-MCNC: 13.5 UG/ML (ref 10–22)
WBC # BLD AUTO: 12.11 K/UL (ref 3.9–12.7)

## 2020-05-24 PROCEDURE — 63600175 PHARM REV CODE 636 W HCPCS: Performed by: PHYSICIAN ASSISTANT

## 2020-05-24 PROCEDURE — 25000003 PHARM REV CODE 250: Performed by: NURSE PRACTITIONER

## 2020-05-24 PROCEDURE — 85025 COMPLETE CBC W/AUTO DIFF WBC: CPT

## 2020-05-24 PROCEDURE — 63600175 PHARM REV CODE 636 W HCPCS: Performed by: INTERNAL MEDICINE

## 2020-05-24 PROCEDURE — 25000003 PHARM REV CODE 250: Performed by: INTERNAL MEDICINE

## 2020-05-24 PROCEDURE — G0378 HOSPITAL OBSERVATION PER HR: HCPCS

## 2020-05-24 PROCEDURE — 80053 COMPREHEN METABOLIC PANEL: CPT

## 2020-05-24 PROCEDURE — 80202 ASSAY OF VANCOMYCIN: CPT

## 2020-05-24 PROCEDURE — 36415 COLL VENOUS BLD VENIPUNCTURE: CPT

## 2020-05-24 PROCEDURE — 25000003 PHARM REV CODE 250: Performed by: PHYSICIAN ASSISTANT

## 2020-05-24 RX ORDER — CLINDAMYCIN PHOSPHATE 600 MG/50ML
600 INJECTION, SOLUTION INTRAVENOUS
Status: DISCONTINUED | OUTPATIENT
Start: 2020-05-24 | End: 2020-05-26 | Stop reason: HOSPADM

## 2020-05-24 RX ORDER — BACLOFEN 10 MG/1
20 TABLET ORAL 4 TIMES DAILY
Status: DISCONTINUED | OUTPATIENT
Start: 2020-05-24 | End: 2020-05-26 | Stop reason: HOSPADM

## 2020-05-24 RX ADMIN — HYDROCODONE BITARTRATE AND ACETAMINOPHEN 1 TABLET: 5; 325 TABLET ORAL at 04:05

## 2020-05-24 RX ADMIN — BACLOFEN 20 MG: 10 TABLET ORAL at 02:05

## 2020-05-24 RX ADMIN — CEFEPIME HYDROCHLORIDE 1 G: 1 INJECTION, SOLUTION INTRAVENOUS at 09:05

## 2020-05-24 RX ADMIN — HYDROCODONE BITARTRATE AND ACETAMINOPHEN 1 TABLET: 5; 325 TABLET ORAL at 02:05

## 2020-05-24 RX ADMIN — VANCOMYCIN HYDROCHLORIDE 1000 MG: 1 INJECTION, POWDER, LYOPHILIZED, FOR SOLUTION INTRAVENOUS at 02:05

## 2020-05-24 RX ADMIN — VANCOMYCIN HYDROCHLORIDE 1000 MG: 1 INJECTION, POWDER, LYOPHILIZED, FOR SOLUTION INTRAVENOUS at 03:05

## 2020-05-24 RX ADMIN — Medication 6 MG: at 08:05

## 2020-05-24 RX ADMIN — BACLOFEN 20 MG: 10 TABLET ORAL at 08:05

## 2020-05-24 RX ADMIN — BACLOFEN 20 MG: 10 TABLET ORAL at 04:05

## 2020-05-24 RX ADMIN — HYDROCODONE BITARTRATE AND ACETAMINOPHEN 1 TABLET: 5; 325 TABLET ORAL at 07:05

## 2020-05-24 RX ADMIN — CLINDAMYCIN IN 5 PERCENT DEXTROSE 600 MG: 12 INJECTION, SOLUTION INTRAVENOUS at 04:05

## 2020-05-24 RX ADMIN — HYDROCODONE BITARTRATE AND ACETAMINOPHEN 1 TABLET: 5; 325 TABLET ORAL at 09:05

## 2020-05-24 RX ADMIN — HYDROCODONE BITARTRATE AND ACETAMINOPHEN 1 TABLET: 5; 325 TABLET ORAL at 11:05

## 2020-05-24 RX ADMIN — GABAPENTIN 300 MG: 300 CAPSULE ORAL at 09:05

## 2020-05-24 RX ADMIN — GABAPENTIN 300 MG: 300 CAPSULE ORAL at 08:05

## 2020-05-24 RX ADMIN — VANCOMYCIN HYDROCHLORIDE 1000 MG: 1 INJECTION, POWDER, LYOPHILIZED, FOR SOLUTION INTRAVENOUS at 08:05

## 2020-05-24 RX ADMIN — CLINDAMYCIN IN 5 PERCENT DEXTROSE 600 MG: 12 INJECTION, SOLUTION INTRAVENOUS at 08:05

## 2020-05-24 RX ADMIN — GABAPENTIN 300 MG: 300 CAPSULE ORAL at 02:05

## 2020-05-24 NOTE — PLAN OF CARE
Remains free from injury. States relief of pain with available prn meds. Fluids and abx running as ordered. Wound vac and dressing maintained. Vital signs stable. Chart reviewed. Will continue to monitor.

## 2020-05-24 NOTE — ASSESSMENT & PLAN NOTE
Dressing reinforcement PRN  iodoform gauze was removed yesterday.  Will follow cultures for final Antibiotic therapy  -continue empiric therapy per HM team      Will need home health wound care for the ankle

## 2020-05-24 NOTE — PROGRESS NOTES
Pharmacokinetic Assessment Follow Up: IV Vancomycin    Vancomycin serum concentration assessment(s):    The trough level was drawn correctly and can be used to guide therapy at this time. The measurement is within the desired definitive target range of 10 to 15 mcg/mL.    Vancomycin Regimen Plan:    Continue regimen to Vancomycin 1000 mg IV every 8 hours with next serum trough concentration measured at 1300 prior to 5/25 dose on 1300    Drug levels (last 3 results):  Recent Labs   Lab Result Units 05/22/20 0157 05/23/20  0242 05/24/20  1151   Vancomycin-Trough ug/mL 3.6* 10.5 13.5       Pharmacy will continue to follow and monitor vancomycin.    Please contact pharmacy at extension [phone] for questions regarding this assessment.    Thank you for the consult,   Vicky Holland       Patient brief summary:  David Mohr is a 25 y.o. male initiated on antimicrobial therapy with IV Vancomycin for treatment of skin & soft tissue infection    The patient's current regimen is Vancomycin 1 gram Q8    Drug Allergies:   Review of patient's allergies indicates:   Allergen Reactions    Aspirin Anaphylaxis       Actual Body Weight:   73.4kg    Renal Function:   Estimated Creatinine Clearance: 180.5 mL/min (based on SCr of 0.6 mg/dL).,     Dialysis Method (if applicable):  N/A    CBC (last 72 hours):  Recent Labs   Lab Result Units 05/22/20 0157 05/23/20 0427 05/24/20  0538   WBC K/uL 30.35* 16.36* 12.11   Hemoglobin g/dL 11.3* 10.0* 11.3*   Hematocrit % 34.8* 32.0* 35.4*   Platelets K/uL 414* 377* 514*   Gran% % 92.0* 73.9* 61.9   Lymph% % 3.2* 16.8* 23.3   Mono% % 3.5* 6.5 8.1   Eosinophil% % 0.2 0.3 1.3   Basophil% % 0.2 0.7 0.9   Differential Method  Automated Automated Automated       Metabolic Panel (last 72 hours):  Recent Labs   Lab Result Units 05/22/20 0157 05/23/20  0427 05/24/20  0538   Sodium mmol/L 138 137 133*   Potassium mmol/L 4.2 3.9 4.4   Chloride mmol/L 100 100 97   CO2 mmol/L 27 30* 32*   Glucose  mg/dL 139* 126* 103   BUN, Bld mg/dL 7 8 5*   Creatinine mg/dL 0.7 0.7 0.6   Albumin g/dL 2.3* 2.1* 2.4*   Total Bilirubin mg/dL 0.3 0.2 0.3   Alkaline Phosphatase U/L 96 65 58   AST U/L 19 33 16   ALT U/L 23 47* 36       Vancomycin Administrations:  vancomycin given in the last 96 hours                   vancomycin in dextrose 5 % 1 gram/250 mL IVPB 1,000 mg (mg) 1,000 mg New Bag 05/24/20 0334     1,000 mg New Bag 05/23/20 1908    vancomycin in dextrose 5 % 1 gram/250 mL IVPB 1,000 mg (mg) 1,000 mg New Bag 05/23/20 1129     1,000 mg New Bag  0317     1,000 mg New Bag 05/22/20 1806     1,000 mg New Bag  1010                Microbiologic Results:  Microbiology Results (last 7 days)     Procedure Component Value Units Date/Time    Blood culture x two cultures. Draw prior to antibiotics. [[id number]] Collected:  05/20/20 1237    Order Status:  Completed Specimen:  Blood from Peripheral, Hand, Right Updated:  05/23/20 2212     Blood Culture, Routine No Growth to date      No Growth to date      No Growth to date      No Growth to date    Narrative:       Aerobic and anaerobic    Blood culture x two cultures. Draw prior to antibiotics. [[id number]] Collected:  05/20/20 1225    Order Status:  Completed Specimen:  Blood from Peripheral, Antecubital, Right Updated:  05/23/20 2022     Blood Culture, Routine No Growth to date      No Growth to date      No Growth to date      No Growth to date    Narrative:       Aerobic and anaerobic    Aerobic culture [[id number]]  (Abnormal) Collected:  05/21/20 1107    Order Status:  Completed Specimen:  Abscess from Leg, Left Updated:  05/23/20 1116     Aerobic Bacterial Culture STREPTOCOCCUS PYOGENES (GROUP A)  Moderate  Beta-hemolytic streptococci are routinely susceptible to   penicillins,cephalosporins and carbapenems.  Susceptibility testing not routinely performed      Culture, Anaerobe [[id number]] Collected:  05/21/20 1107    Order Status:  Completed Specimen:  Abscess from Leg, Left  Updated:  05/23/20 1107     Anaerobic Culture Culture in progress

## 2020-05-24 NOTE — SUBJECTIVE & OBJECTIVE
"Principal Problem:Sepsis    Principal Orthopedic Problem: Abscesses LLE    Interval History: POD 3 s/p I&D of left knee and left ankle. States pain controlled. Wound vac working appropriately.  Discoloration of skin of the left ankle with retained surgical packing    Review of patient's allergies indicates:   Allergen Reactions    Aspirin Anaphylaxis       Current Facility-Administered Medications   Medication    0.9%  NaCl infusion    acetaminophen tablet 650 mg    aluminum-magnesium hydroxide-simethicone 200-200-20 mg/5 mL suspension 30 mL    baclofen tablet 20 mg    cefepime in dextrose 5 % 1 gram/50 mL IVPB 1 g    gabapentin capsule 300 mg    hydrALAZINE injection 10 mg    HYDROcodone-acetaminophen 5-325 mg per tablet 1 tablet    melatonin tablet 6 mg    nozaseptin (NOZIN) nasal     ondansetron tablet 4 mg    traMADoL tablet 50 mg    vancomycin in dextrose 5 % 1 gram/250 mL IVPB 1,000 mg     Objective:     Vital Signs (Most Recent):  Temp: 98.5 °F (36.9 °C) (05/24/20 1206)  Pulse: 89 (05/24/20 1206)  Resp: 18 (05/24/20 1206)  BP: (!) 144/63 (05/24/20 1206)  SpO2: 96 % (05/24/20 1206) Vital Signs (24h Range):  Temp:  [97.8 °F (36.6 °C)-98.8 °F (37.1 °C)] 98.5 °F (36.9 °C)  Pulse:  [82-91] 89  Resp:  [17-18] 18  SpO2:  [94 %-97 %] 96 %  BP: (116-144)/(63-80) 144/63     Weight: 73.4 kg (161 lb 12.8 oz)  Height: 5' 8" (172.7 cm)  Body mass index is 24.6 kg/m².      Intake/Output Summary (Last 24 hours) at 5/24/2020 1309  Last data filed at 5/24/2020 1206  Gross per 24 hour   Intake 5740 ml   Output 4570 ml   Net 1170 ml       Ortho/SPM Exam     Wound VAC working appropriately  Dressing to left ankle CDI  NVI      Significant Labs:   CBC:   Recent Labs   Lab 05/23/20  0427 05/24/20  0538   WBC 16.36* 12.11   HGB 10.0* 11.3*   HCT 32.0* 35.4*   * 514*     All pertinent labs within the past 24 hours have been reviewed.    Significant Imaging: I have reviewed all pertinent imaging " results/findings.

## 2020-05-24 NOTE — ASSESSMENT & PLAN NOTE
- S/p incision and drainage of left leg pretibial abscess and wound vac placement by Dr. Ramirez.  - Wound cx shows strep pyogenes  - Continue vancomycin and cefepime  - Will d/w ID to determine ABX plan upon DC.  - PRN analgesia  - CM following to arrange for HH for wound care and VAC management.   - Will need outpatient follow-up with ortho for monitoring upon DC

## 2020-05-24 NOTE — SUBJECTIVE & OBJECTIVE
"Interval History:  No acute events overnight.  Patient is currently resting comfortably in bed in no acute distress.  patient reports to be expected postoperative pain to left lower extremity, currently rates 4/10, no other complaints.  CM following to arrange wound VAC and home health upon DC.  Leukocytosis has resolved and patient remains afebrile.  BC show NGTD.  Wound cx collected on 5/21 shows strep pyogens.  Patient currently on cefepime and vancomycin.  Will d/w ID to determine ABX plan upon DC.  Anticipate DC home once wound vac is available, and if ok with ortho.    Review of Systems   Constitutional: Negative for chills, diaphoresis, fatigue and fever.   HENT: Negative.  Negative for hearing loss, mouth sores, sore throat, tinnitus and trouble swallowing.    Eyes: Negative for pain, discharge and redness.   Respiratory: Negative for apnea, cough, choking, chest tightness, shortness of breath, wheezing and stridor.    Cardiovascular: Positive for leg swelling. Negative for chest pain and palpitations.   Gastrointestinal: Negative.  Negative for abdominal distention, abdominal pain, blood in stool, constipation, diarrhea, nausea, rectal pain and vomiting.   Endocrine: Negative for cold intolerance, heat intolerance, polydipsia, polyphagia and polyuria.   Genitourinary: Negative for difficulty urinating, dysuria, flank pain, frequency, hematuria and urgency.   Musculoskeletal: Positive for arthralgias and gait problem. Negative for back pain, joint swelling, neck pain and neck stiffness.        Wheelchair bound; c/o lower extremity "spasms".   Skin: Positive for color change and wound. Negative for rash.   Allergic/Immunologic: Negative for food allergies.   Neurological: Positive for weakness. Negative for dizziness, tremors, seizures, syncope, speech difficulty, light-headedness and headaches.   Hematological: Negative for adenopathy. Does not bruise/bleed easily.   Psychiatric/Behavioral: Negative for " agitation and confusion. The patient is nervous/anxious.    All other systems reviewed and are negative.    Objective:     Vital Signs (Most Recent):  Temp: 98.5 °F (36.9 °C) (05/24/20 1206)  Pulse: 89 (05/24/20 1206)  Resp: 18 (05/24/20 1206)  BP: (!) 144/63 (05/24/20 1206)  SpO2: 96 % (05/24/20 1206) Vital Signs (24h Range):  Temp:  [97.8 °F (36.6 °C)-98.8 °F (37.1 °C)] 98.5 °F (36.9 °C)  Pulse:  [82-91] 89  Resp:  [17-18] 18  SpO2:  [94 %-97 %] 96 %  BP: (116-144)/(63-80) 144/63     Weight: 73.4 kg (161 lb 12.8 oz)  Body mass index is 24.6 kg/m².    Intake/Output Summary (Last 24 hours) at 5/24/2020 1235  Last data filed at 5/24/2020 1206  Gross per 24 hour   Intake 5740 ml   Output 4570 ml   Net 1170 ml      Physical Exam   Constitutional: He is oriented to person, place, and time. He appears well-developed and well-nourished. No distress.   HENT:   Head: Normocephalic and atraumatic.   Nose: Nose normal.   Mouth/Throat: Oropharynx is clear and moist.   Eyes: Pupils are equal, round, and reactive to light. Conjunctivae and EOM are normal. No scleral icterus.   Neck: Normal range of motion. Neck supple. No JVD present.   Cardiovascular: Normal rate, regular rhythm, normal heart sounds and intact distal pulses. Exam reveals no gallop and no friction rub.   No murmur heard.  Pulmonary/Chest: Effort normal and breath sounds normal. No stridor. No respiratory distress. He has no wheezes. He has no rales. He exhibits no tenderness.   Comfortable on RA.   Abdominal: Soft. Bowel sounds are normal. He exhibits no distension and no mass. There is no tenderness. There is no rebound and no guarding.   Musculoskeletal: Normal range of motion. He exhibits no edema or tenderness.   Cerebral palsy has affected the bilateral legs - there is limited ROM     Neurological: He is alert and oriented to person, place, and time. No cranial nerve deficit.   Skin: Skin is warm and dry. No rash noted. He is not diaphoretic. No erythema.         Scattered scabbing wounds to various stages on arms and legs    LLE with erythema and swelling (improving)   Psychiatric: He has a normal mood and affect. His behavior is normal. Judgment and thought content normal.   Nursing note and vitals reviewed.      Significant Labs:   Blood Culture: No results for input(s): LABBLOO in the last 48 hours.  CBC:   Recent Labs   Lab 05/23/20 0427 05/24/20  0538   WBC 16.36* 12.11   HGB 10.0* 11.3*   HCT 32.0* 35.4*   * 514*     CMP:   Recent Labs   Lab 05/23/20 0427 05/24/20  0538    133*   K 3.9 4.4    97   CO2 30* 32*   * 103   BUN 8 5*   CREATININE 0.7 0.6   CALCIUM 8.6* 9.5   PROT 6.5 7.0   ALBUMIN 2.1* 2.4*   BILITOT 0.2 0.3   ALKPHOS 65 58   AST 33 16   ALT 47* 36   ANIONGAP 7* 4*   EGFRNONAA >60 >60       Significant Imaging: I have reviewed all pertinent imaging results/findings within the past 24 hours.

## 2020-05-24 NOTE — PROGRESS NOTES
"[facility]  Orthopedics  Progress Note    Patient Name: David Mohr  MRN: [id number]  Admission Date: 5/20/2020  Hospital Length of Stay: 1 days  Attending Provider: Damir Lizarraga MD  Primary Care Provider: No primary care provider on file.  Follow-up For: Procedure(s) (LRB):  INCISION AND DRAINAGE, LOWER EXTREMITY (Left)  APPLICATION, WOUND VAC (Left)    Post-Operative Day: 3 Days Post-Op  Subjective:     Principal Problem:Sepsis    Principal Orthopedic Problem: Abscesses LLE    Interval History: POD 3 s/p I&D of left knee and left ankle. States pain controlled. Wound vac working appropriately.  Discoloration of skin of the left ankle with retained surgical packing    Review of patient's allergies indicates:   Allergen Reactions    Aspirin Anaphylaxis       Current Facility-Administered Medications   Medication    0.9%  NaCl infusion    acetaminophen tablet 650 mg    aluminum-magnesium hydroxide-simethicone 200-200-20 mg/5 mL suspension 30 mL    baclofen tablet 20 mg    cefepime in dextrose 5 % 1 gram/50 mL IVPB 1 g    gabapentin capsule 300 mg    hydrALAZINE injection 10 mg    HYDROcodone-acetaminophen 5-325 mg per tablet 1 tablet    melatonin tablet 6 mg    nozaseptin (NOZIN) nasal     ondansetron tablet 4 mg    traMADoL tablet 50 mg    vancomycin in dextrose 5 % 1 gram/250 mL IVPB 1,000 mg     Objective:     Vital Signs (Most Recent):  Temp: 98.5 °F (36.9 °C) (05/24/20 1206)  Pulse: 89 (05/24/20 1206)  Resp: 18 (05/24/20 1206)  BP: (!) 144/63 (05/24/20 1206)  SpO2: 96 % (05/24/20 1206) Vital Signs (24h Range):  Temp:  [97.8 °F (36.6 °C)-98.8 °F (37.1 °C)] 98.5 °F (36.9 °C)  Pulse:  [82-91] 89  Resp:  [17-18] 18  SpO2:  [94 %-97 %] 96 %  BP: (116-144)/(63-80) 144/63     Weight: 73.4 kg (161 lb 12.8 oz)  Height: 5' 8" (172.7 cm)  Body mass index is 24.6 kg/m².      Intake/Output Summary (Last 24 hours) at 5/24/2020 1309  Last data filed at 5/24/2020 1206  Gross per 24 " hour   Intake 5740 ml   Output 4570 ml   Net 1170 ml       Ortho/SPM Exam     Wound VAC working appropriately  Dressing to left ankle CDI  NVI      Significant Labs:   CBC:   Recent Labs   Lab 05/23/20  0427 05/24/20  0538   WBC 16.36* 12.11   HGB 10.0* 11.3*   HCT 32.0* 35.4*   * 514*     All pertinent labs within the past 24 hours have been reviewed.    Significant Imaging: I have reviewed all pertinent imaging results/findings.    Assessment/Plan:     Abscess of left knee  Wound care consult for wound VAC monitoring  Awaiting cultures results for Antibiotic therapy  -current empiric therapy per  team    Will need home care for wound care and VAC management      Abscess of ankle  Dressing reinforcement PRN  iodoform gauze was removed yesterday.  Will follow cultures for final Antibiotic therapy  -continue empiric therapy per  team      Will need home health wound care for the ankle          José Pinzon,   Orthopedics  [facility]

## 2020-05-24 NOTE — ASSESSMENT & PLAN NOTE
- S/p incision and drainage of left ankle medial malleolus abscess and wound vac placement by Dr. Ramirez.  - continue local wound care per Ortho recs  - Continue ABX (see plan as per above)  - PRN analgesia

## 2020-05-24 NOTE — PROGRESS NOTES
"[facility] Hospital Medicine  Progress Note    Patient Name: David Mohr  MRN: [id number]  Patient Class: OP- Observation   Admission Date: 5/20/2020  Length of Stay: 1 days  Attending Physician: Damir Lizarraga MD  Primary Care Provider: No primary care provider on file.        Subjective:     Principal Problem:Sepsis        HPI:  Pt is a 26 yo male with PMHx of Cerebral Palsy, poly substance abuse, depression, anxiety and seizure disorder who presents to the ED with reports of swelling to the left anterior knee for "a few days."   Pt states he developed another abscess to the left anterior knee and tried to open with a needle. He developed increasing pain and redness to the left leg therefore, presented for evaluation. Temp max 100.0. Also, pt describes an abscess to the left medial ankle but cannot give a time frame.   Vital signs note normal temp, pulse 123, resp 20, B/P 123/71 and pulse ox 97%.  Knee xray - prominent soft tissue swelling anterior to the proximal tibia, hematoma versus fluid collection versus focal edema.   ED provider performed I & D to left anterior knee, packing is present and ACE wrap noted. Pt is admitted due to SIRS, left lower leg cellulitis and left medial ankle abscess.     Overview/Hospital Course:  25 year old male admitted for left knee and ankle abscess. Patient being treated with IV vancomycin/cefepime. Orthopedics consulted. As of 5/21/20 pt s/p incision and drainage of left leg pretibial and left ankle medial malleolus abscess and wound vac placement by Dr. Ramirez. Patient tolerated well. WBCs trending downward. Continue IV abx therapy. As of 5/22 POD #1, pain controlled.  WBCs 30.35k up from 21.47k. Likely reactive/stress related. Monitor closely. Continue IV vancomycin/cefepime. Blood cx remain negative and pt afebrile.  Wound cx pending. As of 5/23 POD #2 pt lying in bed, pain controlled. WBCs 16.36k down from 30.35k. Social work consult to arrange for HH " for wound care and VAC management.     As of 5/24 patient reports to be expected postoperative pain to left lower extremity, currently rates 4/10, no other complaints.  CM following to arrange wound VAC and home health upon DC.  Leukocytosis has resolved and patient remains afebrile.  BC show NGTD.  Wound cx collected on 5/21 shows strep pyogens.  Patient currently on cefepime and vancomycin.  Will d/w ID to determine ABX plan upon DC.  Anticipate DC home once wound vac is available, and if ok with ortho.    Interval History:  No acute events overnight.  Patient is currently resting comfortably in bed in no acute distress.  patient reports to be expected postoperative pain to left lower extremity, currently rates 4/10, no other complaints.  CM following to arrange wound VAC and home health upon DC.  Leukocytosis has resolved and patient remains afebrile.  BC show NGTD.  Wound cx collected on 5/21 shows strep pyogens.  Patient currently on cefepime and vancomycin.  Will d/w ID to determine ABX plan upon DC.  Anticipate DC home once wound vac is available, and if ok with ortho.    Review of Systems   Constitutional: Negative for chills, diaphoresis, fatigue and fever.   HENT: Negative.  Negative for hearing loss, mouth sores, sore throat, tinnitus and trouble swallowing.    Eyes: Negative for pain, discharge and redness.   Respiratory: Negative for apnea, cough, choking, chest tightness, shortness of breath, wheezing and stridor.    Cardiovascular: Positive for leg swelling. Negative for chest pain and palpitations.   Gastrointestinal: Negative.  Negative for abdominal distention, abdominal pain, blood in stool, constipation, diarrhea, nausea, rectal pain and vomiting.   Endocrine: Negative for cold intolerance, heat intolerance, polydipsia, polyphagia and polyuria.   Genitourinary: Negative for difficulty urinating, dysuria, flank pain, frequency, hematuria and urgency.   Musculoskeletal: Positive for arthralgias and  "gait problem. Negative for back pain, joint swelling, neck pain and neck stiffness.        Wheelchair bound; c/o lower extremity "spasms".   Skin: Positive for color change and wound. Negative for rash.   Allergic/Immunologic: Negative for food allergies.   Neurological: Positive for weakness. Negative for dizziness, tremors, seizures, syncope, speech difficulty, light-headedness and headaches.   Hematological: Negative for adenopathy. Does not bruise/bleed easily.   Psychiatric/Behavioral: Negative for agitation and confusion. The patient is nervous/anxious.    All other systems reviewed and are negative.    Objective:     Vital Signs (Most Recent):  Temp: 98.5 °F (36.9 °C) (05/24/20 1206)  Pulse: 89 (05/24/20 1206)  Resp: 18 (05/24/20 1206)  BP: (!) 144/63 (05/24/20 1206)  SpO2: 96 % (05/24/20 1206) Vital Signs (24h Range):  Temp:  [97.8 °F (36.6 °C)-98.8 °F (37.1 °C)] 98.5 °F (36.9 °C)  Pulse:  [82-91] 89  Resp:  [17-18] 18  SpO2:  [94 %-97 %] 96 %  BP: (116-144)/(63-80) 144/63     Weight: 73.4 kg (161 lb 12.8 oz)  Body mass index is 24.6 kg/m².    Intake/Output Summary (Last 24 hours) at 5/24/2020 1235  Last data filed at 5/24/2020 1206  Gross per 24 hour   Intake 5740 ml   Output 4570 ml   Net 1170 ml      Physical Exam   Constitutional: He is oriented to person, place, and time. He appears well-developed and well-nourished. No distress.   HENT:   Head: Normocephalic and atraumatic.   Nose: Nose normal.   Mouth/Throat: Oropharynx is clear and moist.   Eyes: Pupils are equal, round, and reactive to light. Conjunctivae and EOM are normal. No scleral icterus.   Neck: Normal range of motion. Neck supple. No JVD present.   Cardiovascular: Normal rate, regular rhythm, normal heart sounds and intact distal pulses. Exam reveals no gallop and no friction rub.   No murmur heard.  Pulmonary/Chest: Effort normal and breath sounds normal. No stridor. No respiratory distress. He has no wheezes. He has no rales. He exhibits " no tenderness.   Comfortable on RA.   Abdominal: Soft. Bowel sounds are normal. He exhibits no distension and no mass. There is no tenderness. There is no rebound and no guarding.   Musculoskeletal: Normal range of motion. He exhibits no edema or tenderness.   Cerebral palsy has affected the bilateral legs - there is limited ROM     Neurological: He is alert and oriented to person, place, and time. No cranial nerve deficit.   Skin: Skin is warm and dry. No rash noted. He is not diaphoretic. No erythema.        Scattered scabbing wounds to various stages on arms and legs    LLE with erythema and swelling (improving)   Psychiatric: He has a normal mood and affect. His behavior is normal. Judgment and thought content normal.   Nursing note and vitals reviewed.      Significant Labs:   Blood Culture: No results for input(s): LABBLOO in the last 48 hours.  CBC:   Recent Labs   Lab 05/23/20 0427 05/24/20  0538   WBC 16.36* 12.11   HGB 10.0* 11.3*   HCT 32.0* 35.4*   * 514*     CMP:   Recent Labs   Lab 05/23/20 0427 05/24/20  0538    133*   K 3.9 4.4    97   CO2 30* 32*   * 103   BUN 8 5*   CREATININE 0.7 0.6   CALCIUM 8.6* 9.5   PROT 6.5 7.0   ALBUMIN 2.1* 2.4*   BILITOT 0.2 0.3   ALKPHOS 65 58   AST 33 16   ALT 47* 36   ANIONGAP 7* 4*   EGFRNONAA >60 >60       Significant Imaging: I have reviewed all pertinent imaging results/findings within the past 24 hours.      Assessment/Plan:      * Sepsis  - Likely 2/2 left knee and left ankle abscess   - BC show NGTD  - Would cx shows strep pyogens  - Continue vancomycin and cefepime  - pharm D following for vanc dosing  - continue IVFs  - remains hemodynamically stable            Abscess of left knee  - S/p incision and drainage of left leg pretibial abscess and wound vac placement by Dr. Ramirez.  - Wound cx shows strep pyogenes  - Continue vancomycin and cefepime  - Will d/w ID to determine ABX plan upon DC.  - PRN analgesia  - CM following to  arrange for HH for wound care and VAC management.   - Will need outpatient follow-up with ortho for monitoring upon DC        Abscess of ankle  - S/p incision and drainage of left ankle medial malleolus abscess and wound vac placement by Dr. Ramirez.  - continue local wound care per Ortho recs  - Continue ABX (see plan as per above)  - PRN analgesia      Cellulitis of left lower extremity  - Improving  - continue vancomycin and cefepime  - see plan as per above    Polysubstance abuse  - Pt reports THC use  - Encouraged cessation      Cerebral palsy  - WC bound  - Resume home Baclofen today given complaints of spasms  - Outpatient follow up        VTE Risk Mitigation (From admission, onward)         Ordered     Place sequential compression device  Until discontinued      05/20/20 9987                      Ginger Rubin, MICHELLE, ACNP-BC  Department of Hospital Medicine   [facility]

## 2020-05-24 NOTE — ASSESSMENT & PLAN NOTE
- Likely 2/2 left knee and left ankle abscess   - BC show NGTD  - Would cx shows strep pyogens  - Continue vancomycin and cefepime  - pharm D following for vanc dosing  - continue IVFs  - remains hemodynamically stable

## 2020-05-24 NOTE — PLAN OF CARE
Fall precautions maintained. Pt free from falls/injuries.  Patient complains of pain. Pain controlled with prn meds.  Antibiotics given as prescribed.  Ambulates and repositions with assistance.  Plan of care and medications discussed with patient.  Patient verbalized understanding.  Bed locked and low, call bell within reach.  Chart check done. Will continue to monitor.

## 2020-05-25 LAB
BACTERIA BLD CULT: NORMAL
BACTERIA BLD CULT: NORMAL
BACTERIA SPEC AEROBE CULT: ABNORMAL
VANCOMYCIN TROUGH SERPL-MCNC: 15.2 UG/ML (ref 10–22)

## 2020-05-25 PROCEDURE — 25000003 PHARM REV CODE 250: Performed by: PHYSICIAN ASSISTANT

## 2020-05-25 PROCEDURE — 63600175 PHARM REV CODE 636 W HCPCS: Performed by: NURSE PRACTITIONER

## 2020-05-25 PROCEDURE — 25000003 PHARM REV CODE 250: Performed by: NURSE PRACTITIONER

## 2020-05-25 PROCEDURE — 25000003 PHARM REV CODE 250: Performed by: INTERNAL MEDICINE

## 2020-05-25 PROCEDURE — 80202 ASSAY OF VANCOMYCIN: CPT

## 2020-05-25 PROCEDURE — 36415 COLL VENOUS BLD VENIPUNCTURE: CPT

## 2020-05-25 PROCEDURE — 63600175 PHARM REV CODE 636 W HCPCS: Performed by: INTERNAL MEDICINE

## 2020-05-25 PROCEDURE — G0378 HOSPITAL OBSERVATION PER HR: HCPCS

## 2020-05-25 RX ORDER — CEFEPIME HYDROCHLORIDE 1 G/50ML
1 INJECTION, SOLUTION INTRAVENOUS EVERY 8 HOURS
Status: DISCONTINUED | OUTPATIENT
Start: 2020-05-25 | End: 2020-05-26 | Stop reason: HOSPADM

## 2020-05-25 RX ORDER — MORPHINE SULFATE 2 MG/ML
2 INJECTION, SOLUTION INTRAMUSCULAR; INTRAVENOUS ONCE AS NEEDED
Status: COMPLETED | OUTPATIENT
Start: 2020-05-25 | End: 2020-05-25

## 2020-05-25 RX ADMIN — MORPHINE SULFATE 2 MG: 2 INJECTION, SOLUTION INTRAMUSCULAR; INTRAVENOUS at 10:05

## 2020-05-25 RX ADMIN — CLINDAMYCIN IN 5 PERCENT DEXTROSE 600 MG: 12 INJECTION, SOLUTION INTRAVENOUS at 01:05

## 2020-05-25 RX ADMIN — HYDROCODONE BITARTRATE AND ACETAMINOPHEN 1 TABLET: 5; 325 TABLET ORAL at 01:05

## 2020-05-25 RX ADMIN — BACLOFEN 20 MG: 10 TABLET ORAL at 04:05

## 2020-05-25 RX ADMIN — CEFEPIME HYDROCHLORIDE 1 G: 1 INJECTION, SOLUTION INTRAVENOUS at 04:05

## 2020-05-25 RX ADMIN — GABAPENTIN 300 MG: 300 CAPSULE ORAL at 08:05

## 2020-05-25 RX ADMIN — BACLOFEN 20 MG: 10 TABLET ORAL at 01:05

## 2020-05-25 RX ADMIN — CEFEPIME HYDROCHLORIDE 1 G: 1 INJECTION, SOLUTION INTRAVENOUS at 11:05

## 2020-05-25 RX ADMIN — CLINDAMYCIN IN 5 PERCENT DEXTROSE 600 MG: 12 INJECTION, SOLUTION INTRAVENOUS at 08:05

## 2020-05-25 RX ADMIN — HYDROCODONE BITARTRATE AND ACETAMINOPHEN 1 TABLET: 5; 325 TABLET ORAL at 06:05

## 2020-05-25 RX ADMIN — HYDROCODONE BITARTRATE AND ACETAMINOPHEN 1 TABLET: 5; 325 TABLET ORAL at 08:05

## 2020-05-25 RX ADMIN — VANCOMYCIN HYDROCHLORIDE 1000 MG: 1 INJECTION, POWDER, LYOPHILIZED, FOR SOLUTION INTRAVENOUS at 01:05

## 2020-05-25 RX ADMIN — CLINDAMYCIN IN 5 PERCENT DEXTROSE 600 MG: 12 INJECTION, SOLUTION INTRAVENOUS at 02:05

## 2020-05-25 RX ADMIN — GABAPENTIN 300 MG: 300 CAPSULE ORAL at 03:05

## 2020-05-25 RX ADMIN — HYDROCODONE BITARTRATE AND ACETAMINOPHEN 1 TABLET: 5; 325 TABLET ORAL at 11:05

## 2020-05-25 RX ADMIN — HYDROCODONE BITARTRATE AND ACETAMINOPHEN 1 TABLET: 5; 325 TABLET ORAL at 04:05

## 2020-05-25 RX ADMIN — Medication 6 MG: at 08:05

## 2020-05-25 RX ADMIN — BACLOFEN 20 MG: 10 TABLET ORAL at 08:05

## 2020-05-25 RX ADMIN — VANCOMYCIN HYDROCHLORIDE 1000 MG: 1 INJECTION, POWDER, LYOPHILIZED, FOR SOLUTION INTRAVENOUS at 04:05

## 2020-05-25 RX ADMIN — SODIUM CHLORIDE: 0.9 INJECTION, SOLUTION INTRAVENOUS at 07:05

## 2020-05-25 NOTE — PLAN OF CARE
Accepted by [facility].         05/25/20 1122   Post-Acute Status   Post-Acute Authorization Home Health   Home Health Status Set-up Complete   Discharge Delays (!) Other       Contacted Radha @ [facility] to discuss delivery of the home vac. Per Radha she notified the  to deliver the vac to patient today. Update to estefani Swartz nurse.

## 2020-05-25 NOTE — PLAN OF CARE
Remains free from injury. States relief of pain with available prn meds. Fluids and abx running as ordered. Vital signs stable. Chart reviewed. Will continue to monitor.

## 2020-05-25 NOTE — PROGRESS NOTES
Clinical Pharmacy: Vancomycin Consult -- Signoff Note    Vancomycin trough @ 1146 today = 15.2 mcg/ml (therapeutic)  Discussed antibiotic therapy with Dr. Lizarraga & we agreed to discontinue vancomycin (today is day # 6). Will treat with cefepime (day 4) & clindamycin (day 2).    Therapy with vancomycin complete and/or consult discontinued by provider.  Pharmacy will sign off, please re-consult as needed.    Thank you for allowing us to participate in this patient's care.   Katherine McArdle, Pharm.D. 5/25/2020 2:30 PM

## 2020-05-25 NOTE — SUBJECTIVE & OBJECTIVE
"Interval History: will review Abx with DR. NNadi, [facility]  set up    Review of Systems   Constitutional: Negative for chills, diaphoresis, fatigue and fever.   HENT: Negative.  Negative for hearing loss, mouth sores, sore throat, tinnitus and trouble swallowing.    Eyes: Negative for pain, discharge and redness.   Respiratory: Negative for apnea, cough, choking, chest tightness, shortness of breath, wheezing and stridor.    Cardiovascular: Positive for leg swelling. Negative for chest pain and palpitations.   Gastrointestinal: Negative.  Negative for abdominal distention, abdominal pain, blood in stool, constipation, diarrhea, nausea, rectal pain and vomiting.   Endocrine: Negative for cold intolerance, heat intolerance, polydipsia, polyphagia and polyuria.   Genitourinary: Negative for difficulty urinating, dysuria, flank pain, frequency, hematuria and urgency.   Musculoskeletal: Positive for arthralgias and gait problem. Negative for back pain, joint swelling, neck pain and neck stiffness.        Wheelchair bound; c/o lower extremity "spasms".   Skin: Positive for color change and wound. Negative for rash.   Allergic/Immunologic: Negative for food allergies.   Neurological: Positive for weakness. Negative for dizziness, tremors, seizures, syncope, speech difficulty, light-headedness and headaches.   Hematological: Negative for adenopathy. Does not bruise/bleed easily.   Psychiatric/Behavioral: Negative for agitation and confusion. The patient is nervous/anxious.    All other systems reviewed and are negative.    Objective:     Vital Signs (Most Recent):  Temp: 98.7 °F (37.1 °C) (05/25/20 1707)  Pulse: 91 (05/25/20 1707)  Resp: 18 (05/25/20 1707)  BP: 133/76 (05/25/20 1707)  SpO2: 98 % (05/25/20 1707) Vital Signs (24h Range):  Temp:  [98 °F (36.7 °C)-98.7 °F (37.1 °C)] 98.7 °F (37.1 °C)  Pulse:  [] 91  Resp:  [15-20] 18  SpO2:  [95 %-98 %] 98 %  BP: (132-148)/(76-97) 133/76     Weight: 73.4 kg (161 lb 12.8 " oz)  Body mass index is 24.6 kg/m².    Intake/Output Summary (Last 24 hours) at 5/25/2020 1847  Last data filed at 5/25/2020 1700  Gross per 24 hour   Intake 3030 ml   Output 4560 ml   Net -1530 ml      Physical Exam   Constitutional: He is oriented to person, place, and time. He appears well-developed and well-nourished. No distress.   HENT:   Head: Normocephalic and atraumatic.   Nose: Nose normal.   Mouth/Throat: Oropharynx is clear and moist.   Eyes: Pupils are equal, round, and reactive to light. Conjunctivae and EOM are normal. No scleral icterus.   Neck: Normal range of motion. Neck supple. No JVD present.   Cardiovascular: Normal rate, regular rhythm, normal heart sounds and intact distal pulses. Exam reveals no gallop and no friction rub.   No murmur heard.  Pulmonary/Chest: Effort normal and breath sounds normal. No stridor. No respiratory distress. He has no wheezes. He has no rales. He exhibits no tenderness.   Comfortable on RA.   Abdominal: Soft. Bowel sounds are normal. He exhibits no distension and no mass. There is no tenderness. There is no rebound and no guarding.   Musculoskeletal: Normal range of motion. He exhibits no edema or tenderness.   Cerebral palsy has affected the bilateral legs - there is limited ROM     Neurological: He is alert and oriented to person, place, and time. No cranial nerve deficit.   Skin: Skin is warm and dry. No rash noted. He is not diaphoretic. No erythema.        Scattered scabbing wounds to various stages on arms and legs    LLE with erythema and swelling (improving)   Psychiatric: He has a normal mood and affect. His behavior is normal. Judgment and thought content normal.   Nursing note and vitals reviewed.    Significant Labs: All pertinent labs within the past 24 hours have been reviewed.  Results for orders placed or performed during the hospital encounter of 05/20/20   Blood culture x two cultures. Draw prior to antibiotics.   Result Value Ref Range    Blood  Culture, Routine No Growth to date     Blood Culture, Routine No Growth to date     Blood Culture, Routine No Growth to date     Blood Culture, Routine No Growth to date     Blood Culture, Routine No Growth to date    Blood culture x two cultures. Draw prior to antibiotics.   Result Value Ref Range    Blood Culture, Routine No Growth to date     Blood Culture, Routine No Growth to date     Blood Culture, Routine No Growth to date     Blood Culture, Routine No Growth to date     Blood Culture, Routine No Growth to date    Culture, Anaerobe   Result Value Ref Range    Anaerobic Culture Culture in progress    Aerobic culture   Result Value Ref Range    Aerobic Bacterial Culture (A)      STREPTOCOCCUS PYOGENES (GROUP A)  Moderate  Beta-hemolytic streptococci are routinely susceptible to   penicillins,cephalosporins and carbapenems.  Susceptibility testing not routinely performed     CBC auto differential   Result Value Ref Range    WBC 25.33 (H) 3.90 - 12.70 K/uL    RBC 4.36 (L) 4.60 - 6.20 M/uL    Hemoglobin 12.4 (L) 14.0 - 18.0 g/dL    Hematocrit 37.4 (L) 40.0 - 54.0 %    Mean Corpuscular Volume 86 82 - 98 fL    Mean Corpuscular Hemoglobin 28.4 27.0 - 31.0 pg    Mean Corpuscular Hemoglobin Conc 33.2 32.0 - 36.0 g/dL    RDW 12.9 11.5 - 14.5 %    Platelets 337 150 - 350 K/uL    MPV 9.9 9.2 - 12.9 fL    Immature Granulocytes 0.8 (H) 0.0 - 0.5 %    Gran # (ANC) 22.4 (H) 1.8 - 7.7 K/uL    Immature Grans (Abs) 0.19 (H) 0.00 - 0.04 K/uL    Lymph # 0.8 (L) 1.0 - 4.8 K/uL    Mono # 1.8 (H) 0.3 - 1.0 K/uL    Eos # 0.0 0.0 - 0.5 K/uL    Baso # 0.08 0.00 - 0.20 K/uL    nRBC 0 0 /100 WBC    Gran% 88.4 (H) 38.0 - 73.0 %    Lymph% 3.2 (L) 18.0 - 48.0 %    Mono% 7.3 4.0 - 15.0 %    Eosinophil% 0.0 0.0 - 8.0 %    Basophil% 0.3 0.0 - 1.9 %    Poik Slight     Ovalocytes Occasional     Tear Drop Cells Occasional     Differential Method Automated    Comprehensive metabolic panel   Result Value Ref Range    Sodium 134 (L) 136 - 145 mmol/L     Potassium 4.2 3.5 - 5.1 mmol/L    Chloride 96 95 - 110 mmol/L    CO2 27 23 - 29 mmol/L    Glucose 126 (H) 70 - 110 mg/dL    BUN, Bld 12 6 - 20 mg/dL    Creatinine 0.8 0.5 - 1.4 mg/dL    Calcium 9.4 8.7 - 10.5 mg/dL    Total Protein 7.8 6.0 - 8.4 g/dL    Albumin 2.8 (L) 3.5 - 5.2 g/dL    Total Bilirubin 0.5 0.1 - 1.0 mg/dL    Alkaline Phosphatase 82 55 - 135 U/L    AST 23 10 - 40 U/L    ALT 21 10 - 44 U/L    Anion Gap 11 8 - 16 mmol/L    eGFR if African American >60 >60 mL/min/1.73 m^2    eGFR if non African American >60 >60 mL/min/1.73 m^2   Lactic acid, plasma #1   Result Value Ref Range    Lactate (Lactic Acid) 1.8 0.5 - 2.2 mmol/L   C-reactive protein   Result Value Ref Range    .0 (H) 0.0 - 8.2 mg/L   Sedimentation rate   Result Value Ref Range    Sed Rate 102 (H) 0 - 10 mm/Hr   COVID-19 Rapid Screening   Result Value Ref Range    SARS-CoV-2 RNA, Amplification, Qual Negative Negative   Urinalysis, Reflex to Urine Culture Urine, Clean Catch   Result Value Ref Range    Specimen UA Urine, Clean Catch     Color, UA Yellow Yellow, Straw, Ashley    Appearance, UA Clear Clear    pH, UA 6.0 5.0 - 8.0    Specific Gravity, UA 1.015 1.005 - 1.030    Protein, UA Trace (A) Negative    Glucose, UA Negative Negative    Ketones, UA Negative Negative    Bilirubin (UA) Negative Negative    Occult Blood UA Trace (A) Negative    Nitrite, UA Negative Negative    Urobilinogen, UA 2.0-3.0 (A) <2.0 EU/dL    Leukocytes, UA Negative Negative   CBC auto differential   Result Value Ref Range    WBC 21.47 (H) 3.90 - 12.70 K/uL    RBC 3.83 (L) 4.60 - 6.20 M/uL    Hemoglobin 10.7 (L) 14.0 - 18.0 g/dL    Hematocrit 33.4 (L) 40.0 - 54.0 %    Mean Corpuscular Volume 87 82 - 98 fL    Mean Corpuscular Hemoglobin 27.9 27.0 - 31.0 pg    Mean Corpuscular Hemoglobin Conc 32.0 32.0 - 36.0 g/dL    RDW 13.5 11.5 - 14.5 %    Platelets 328 150 - 350 K/uL    MPV 10.0 9.2 - 12.9 fL    Immature Granulocytes 0.7 (H) 0.0 - 0.5 %    Gran # (ANC) 18.7  (H) 1.8 - 7.7 K/uL    Immature Grans (Abs) 0.16 (H) 0.00 - 0.04 K/uL    Lymph # 1.1 1.0 - 4.8 K/uL    Mono # 1.5 (H) 0.3 - 1.0 K/uL    Eos # 0.0 0.0 - 0.5 K/uL    Baso # 0.05 0.00 - 0.20 K/uL    nRBC 0 0 /100 WBC    Gran% 87.0 (H) 38.0 - 73.0 %    Lymph% 4.9 (L) 18.0 - 48.0 %    Mono% 7.1 4.0 - 15.0 %    Eosinophil% 0.1 0.0 - 8.0 %    Basophil% 0.2 0.0 - 1.9 %    Differential Method Automated    VANCOMYCIN, TROUGH before 4th dose   Result Value Ref Range    Vancomycin-Trough 3.6 (L) 10.0 - 22.0 ug/mL   CBC auto differential   Result Value Ref Range    WBC 30.35 (H) 3.90 - 12.70 K/uL    RBC 3.98 (L) 4.60 - 6.20 M/uL    Hemoglobin 11.3 (L) 14.0 - 18.0 g/dL    Hematocrit 34.8 (L) 40.0 - 54.0 %    Mean Corpuscular Volume 87 82 - 98 fL    Mean Corpuscular Hemoglobin 28.4 27.0 - 31.0 pg    Mean Corpuscular Hemoglobin Conc 32.5 32.0 - 36.0 g/dL    RDW 13.4 11.5 - 14.5 %    Platelets 414 (H) 150 - 350 K/uL    MPV 9.9 9.2 - 12.9 fL    Immature Granulocytes 0.9 (H) 0.0 - 0.5 %    Gran # (ANC) 28.0 (H) 1.8 - 7.7 K/uL    Immature Grans (Abs) 0.26 (H) 0.00 - 0.04 K/uL    Lymph # 1.0 1.0 - 4.8 K/uL    Mono # 1.1 (H) 0.3 - 1.0 K/uL    Eos # 0.1 0.0 - 0.5 K/uL    Baso # 0.06 0.00 - 0.20 K/uL    nRBC 0 0 /100 WBC    Gran% 92.0 (H) 38.0 - 73.0 %    Lymph% 3.2 (L) 18.0 - 48.0 %    Mono% 3.5 (L) 4.0 - 15.0 %    Eosinophil% 0.2 0.0 - 8.0 %    Basophil% 0.2 0.0 - 1.9 %    Platelet Estimate Increased (A)     Vidalik Slight     Ovalocytes Occasional     Tear Drop Cells Occasional     Vacuolated Granulocytes Present     Differential Method Automated    Comprehensive metabolic panel   Result Value Ref Range    Sodium 138 136 - 145 mmol/L    Potassium 4.2 3.5 - 5.1 mmol/L    Chloride 100 95 - 110 mmol/L    CO2 27 23 - 29 mmol/L    Glucose 139 (H) 70 - 110 mg/dL    BUN, Bld 7 6 - 20 mg/dL    Creatinine 0.7 0.5 - 1.4 mg/dL    Calcium 9.2 8.7 - 10.5 mg/dL    Total Protein 7.2 6.0 - 8.4 g/dL    Albumin 2.3 (L) 3.5 - 5.2 g/dL    Total Bilirubin 0.3  0.1 - 1.0 mg/dL    Alkaline Phosphatase 96 55 - 135 U/L    AST 19 10 - 40 U/L    ALT 23 10 - 44 U/L    Anion Gap 11 8 - 16 mmol/L    eGFR if African American >60 >60 mL/min/1.73 m^2    eGFR if non African American >60 >60 mL/min/1.73 m^2   CBC auto differential   Result Value Ref Range    WBC 16.36 (H) 3.90 - 12.70 K/uL    RBC 3.57 (L) 4.60 - 6.20 M/uL    Hemoglobin 10.0 (L) 14.0 - 18.0 g/dL    Hematocrit 32.0 (L) 40.0 - 54.0 %    Mean Corpuscular Volume 90 82 - 98 fL    Mean Corpuscular Hemoglobin 28.0 27.0 - 31.0 pg    Mean Corpuscular Hemoglobin Conc 31.3 (L) 32.0 - 36.0 g/dL    RDW 13.7 11.5 - 14.5 %    Platelets 377 (H) 150 - 350 K/uL    MPV 10.7 9.2 - 12.9 fL    Immature Granulocytes 1.8 (H) 0.0 - 0.5 %    Gran # (ANC) 12.1 (H) 1.8 - 7.7 K/uL    Immature Grans (Abs) 0.29 (H) 0.00 - 0.04 K/uL    Lymph # 2.8 1.0 - 4.8 K/uL    Mono # 1.1 (H) 0.3 - 1.0 K/uL    Eos # 0.1 0.0 - 0.5 K/uL    Baso # 0.11 0.00 - 0.20 K/uL    nRBC 0 0 /100 WBC    Gran% 73.9 (H) 38.0 - 73.0 %    Lymph% 16.8 (L) 18.0 - 48.0 %    Mono% 6.5 4.0 - 15.0 %    Eosinophil% 0.3 0.0 - 8.0 %    Basophil% 0.7 0.0 - 1.9 %    Differential Method Automated    Comprehensive metabolic panel   Result Value Ref Range    Sodium 137 136 - 145 mmol/L    Potassium 3.9 3.5 - 5.1 mmol/L    Chloride 100 95 - 110 mmol/L    CO2 30 (H) 23 - 29 mmol/L    Glucose 126 (H) 70 - 110 mg/dL    BUN, Bld 8 6 - 20 mg/dL    Creatinine 0.7 0.5 - 1.4 mg/dL    Calcium 8.6 (L) 8.7 - 10.5 mg/dL    Total Protein 6.5 6.0 - 8.4 g/dL    Albumin 2.1 (L) 3.5 - 5.2 g/dL    Total Bilirubin 0.2 0.1 - 1.0 mg/dL    Alkaline Phosphatase 65 55 - 135 U/L    AST 33 10 - 40 U/L    ALT 47 (H) 10 - 44 U/L    Anion Gap 7 (L) 8 - 16 mmol/L    eGFR if African American >60 >60 mL/min/1.73 m^2    eGFR if non African American >60 >60 mL/min/1.73 m^2   VANCOMYCIN, TROUGH before 3rd dose   Result Value Ref Range    Vancomycin-Trough 10.5 10.0 - 22.0 ug/mL   CBC auto differential   Result Value Ref Range     WBC 12.11 3.90 - 12.70 K/uL    RBC 4.01 (L) 4.60 - 6.20 M/uL    Hemoglobin 11.3 (L) 14.0 - 18.0 g/dL    Hematocrit 35.4 (L) 40.0 - 54.0 %    Mean Corpuscular Volume 88 82 - 98 fL    Mean Corpuscular Hemoglobin 28.2 27.0 - 31.0 pg    Mean Corpuscular Hemoglobin Conc 31.9 (L) 32.0 - 36.0 g/dL    RDW 13.2 11.5 - 14.5 %    Platelets 514 (H) 150 - 350 K/uL    MPV 9.0 (L) 9.2 - 12.9 fL    Immature Granulocytes 4.5 (H) 0.0 - 0.5 %    Gran # (ANC) 7.5 1.8 - 7.7 K/uL    Immature Grans (Abs) 0.54 (H) 0.00 - 0.04 K/uL    Lymph # 2.8 1.0 - 4.8 K/uL    Mono # 1.0 0.3 - 1.0 K/uL    Eos # 0.2 0.0 - 0.5 K/uL    Baso # 0.11 0.00 - 0.20 K/uL    nRBC 0 0 /100 WBC    Gran% 61.9 38.0 - 73.0 %    Lymph% 23.3 18.0 - 48.0 %    Mono% 8.1 4.0 - 15.0 %    Eosinophil% 1.3 0.0 - 8.0 %    Basophil% 0.9 0.0 - 1.9 %    Differential Method Automated    Comprehensive metabolic panel   Result Value Ref Range    Sodium 133 (L) 136 - 145 mmol/L    Potassium 4.4 3.5 - 5.1 mmol/L    Chloride 97 95 - 110 mmol/L    CO2 32 (H) 23 - 29 mmol/L    Glucose 103 70 - 110 mg/dL    BUN, Bld 5 (L) 6 - 20 mg/dL    Creatinine 0.6 0.5 - 1.4 mg/dL    Calcium 9.5 8.7 - 10.5 mg/dL    Total Protein 7.0 6.0 - 8.4 g/dL    Albumin 2.4 (L) 3.5 - 5.2 g/dL    Total Bilirubin 0.3 0.1 - 1.0 mg/dL    Alkaline Phosphatase 58 55 - 135 U/L    AST 16 10 - 40 U/L    ALT 36 10 - 44 U/L    Anion Gap 4 (L) 8 - 16 mmol/L    eGFR if African American >60 >60 mL/min/1.73 m^2    eGFR if non African American >60 >60 mL/min/1.73 m^2   VANCOMYCIN, TROUGH before 4th dose   Result Value Ref Range    Vancomycin-Trough 13.5 10.0 - 22.0 ug/mL   VANCOMYCIN, TROUGH before 4th dose   Result Value Ref Range    Vancomycin-Trough 15.2 10.0 - 22.0 ug/mL     Significant Imaging: I have reviewed all pertinent imaging results/findings within the past 24 hours.   Imaging Results          X-Ray Knee Complete 4 or More Views Left (Final result)  Result time 05/20/20 12:44:53    Final result by Farzad Jamsine MD  (05/20/20 12:44:53)                 Impression:      As above.      Electronically signed by: Farzad Jasmine  Date:    05/20/2020  Time:    12:44             Narrative:    EXAMINATION:  XR KNEE COMP 4 OR MORE VIEWS LEFT    CLINICAL HISTORY:  Effusion, left knee    TECHNIQUE:  AP, lateral, and Merchant views of the left knee were performed.    COMPARISON:  03/06/2020    FINDINGS:  Prominent soft tissue swelling anterior to the proximal tibia, hematoma versus fluid collection versus focal edema.  No suprapatellar knee joint effusion.  No appreciable fracture or dislocation.  Chronic moderate medial compartmental joint space narrowing.  Generalized edema about the knee soft tissues raising question of cellulitis.

## 2020-05-25 NOTE — PROGRESS NOTES
Follow up on Mr. Mohr today. Surgical dressing still in place to left ankle- outer dressing changed. Scattered scabs again noted to patients BLE and BUE with larger intact scabs to patients right elbow and right knee. Stage 3 pressure injury noted to patient's right heel with dried red wound bed- all are healing well. Bilateral buttocks, sacrum, and coccyx intact with no redness noted. KCI wound vac is in place to left pretibial wound at 125 mmHG continuous suction. Paused and clamped wound vac, drape and 1 piece of black foam removed. Shaved periwound area and cleansed all with saline and gauze. Wound measures [id number] with moist red wound bed. periwound is macerated. Painted periwound with cavilon. Drape applied to periwound then 1 piece of black sponge cut to fit and placed in wound bed. Covered with drape and attached sensatrac pad to KCI wound vac ulta at 125mmHg continuous low intensity suction. Patient premedicated but reported 10/10 pain with vac change. Plans are to discharge home with home health. Will follow.

## 2020-05-25 NOTE — PROGRESS NOTES
"[facility] Hospital Medicine  Progress Note    Patient Name: David Mohr  MRN: [id number]  Patient Class: OP- Observation   Admission Date: 5/20/2020  Length of Stay: 1 days  Attending Physician: Damir Lizarraga MD  Primary Care Provider: No primary care provider on file.        Subjective:     Principal Problem:Sepsis        HPI:  Pt is a 26 yo male with PMHx of Cerebral Palsy, poly substance abuse, depression, anxiety and seizure disorder who presents to the ED with reports of swelling to the left anterior knee for "a few days."   Pt states he developed another abscess to the left anterior knee and tried to open with a needle. He developed increasing pain and redness to the left leg therefore, presented for evaluation. Temp max 100.0. Also, pt describes an abscess to the left medial ankle but cannot give a time frame.   Vital signs note normal temp, pulse 123, resp 20, B/P 123/71 and pulse ox 97%.  Knee xray - prominent soft tissue swelling anterior to the proximal tibia, hematoma versus fluid collection versus focal edema.   ED provider performed I & D to left anterior knee, packing is present and ACE wrap noted. Pt is admitted due to SIRS, left lower leg cellulitis and left medial ankle abscess.     Overview/Hospital Course:  25 year old male admitted for left knee and ankle abscess. Patient being treated with IV vancomycin/cefepime. Orthopedics consulted. As of 5/21/20 pt s/p incision and drainage of left leg pretibial and left ankle medial malleolus abscess and wound vac placement by Dr. Ramirez. Patient tolerated well. WBCs trending downward. Continue IV abx therapy. As of 5/22 POD #1, pain controlled.  WBCs 30.35k up from 21.47k. Likely reactive/stress related. Monitor closely. Continue IV vancomycin/cefepime. Blood cx remain negative and pt afebrile.  Wound cx pending. As of 5/23 POD #2 pt lying in bed, pain controlled. WBCs 16.36k down from 30.35k. Social work consult to arrange for HH " "for wound care and VAC management.     As of 5/24 patient reports to be expected postoperative pain to left lower extremity, currently rates 4/10, no other complaints.  CM following to arrange wound VAC and home health upon DC.  Leukocytosis has resolved and patient remains afebrile.  BC show NGTD.  Wound cx collected on 5/21 shows strep pyogens.  Patient currently on cefepime and vancomycin.  Will d/w ID to determine ABX plan upon DC.  Anticipate DC home once wound vac is available, and if ok with ortho.    5/25/20 - Aerobic culture streptococcus pyogenes. Will plan on D/c with [facility], [facility] for wound vac, Abx.     Interval History: will review Abx with DR. Cm, [facility] set up    Review of Systems   Constitutional: Negative for chills, diaphoresis, fatigue and fever.   HENT: Negative.  Negative for hearing loss, mouth sores, sore throat, tinnitus and trouble swallowing.    Eyes: Negative for pain, discharge and redness.   Respiratory: Negative for apnea, cough, choking, chest tightness, shortness of breath, wheezing and stridor.    Cardiovascular: Positive for leg swelling. Negative for chest pain and palpitations.   Gastrointestinal: Negative.  Negative for abdominal distention, abdominal pain, blood in stool, constipation, diarrhea, nausea, rectal pain and vomiting.   Endocrine: Negative for cold intolerance, heat intolerance, polydipsia, polyphagia and polyuria.   Genitourinary: Negative for difficulty urinating, dysuria, flank pain, frequency, hematuria and urgency.   Musculoskeletal: Positive for arthralgias and gait problem. Negative for back pain, joint swelling, neck pain and neck stiffness.        Wheelchair bound; c/o lower extremity "spasms".   Skin: Positive for color change and wound. Negative for rash.   Allergic/Immunologic: Negative for food allergies.   Neurological: Positive for weakness. Negative for dizziness, tremors, seizures, syncope, speech difficulty, light-headedness and headaches. "   Hematological: Negative for adenopathy. Does not bruise/bleed easily.   Psychiatric/Behavioral: Negative for agitation and confusion. The patient is nervous/anxious.    All other systems reviewed and are negative.    Objective:     Vital Signs (Most Recent):  Temp: 98.7 °F (37.1 °C) (05/25/20 1707)  Pulse: 91 (05/25/20 1707)  Resp: 18 (05/25/20 1707)  BP: 133/76 (05/25/20 1707)  SpO2: 98 % (05/25/20 1707) Vital Signs (24h Range):  Temp:  [98 °F (36.7 °C)-98.7 °F (37.1 °C)] 98.7 °F (37.1 °C)  Pulse:  [] 91  Resp:  [15-20] 18  SpO2:  [95 %-98 %] 98 %  BP: (132-148)/(76-97) 133/76     Weight: 73.4 kg (161 lb 12.8 oz)  Body mass index is 24.6 kg/m².    Intake/Output Summary (Last 24 hours) at 5/25/2020 1847  Last data filed at 5/25/2020 1700  Gross per 24 hour   Intake 3030 ml   Output 4560 ml   Net -1530 ml      Physical Exam   Constitutional: He is oriented to person, place, and time. He appears well-developed and well-nourished. No distress.   HENT:   Head: Normocephalic and atraumatic.   Nose: Nose normal.   Mouth/Throat: Oropharynx is clear and moist.   Eyes: Pupils are equal, round, and reactive to light. Conjunctivae and EOM are normal. No scleral icterus.   Neck: Normal range of motion. Neck supple. No JVD present.   Cardiovascular: Normal rate, regular rhythm, normal heart sounds and intact distal pulses. Exam reveals no gallop and no friction rub.   No murmur heard.  Pulmonary/Chest: Effort normal and breath sounds normal. No stridor. No respiratory distress. He has no wheezes. He has no rales. He exhibits no tenderness.   Comfortable on RA.   Abdominal: Soft. Bowel sounds are normal. He exhibits no distension and no mass. There is no tenderness. There is no rebound and no guarding.   Musculoskeletal: Normal range of motion. He exhibits no edema or tenderness.   Cerebral palsy has affected the bilateral legs - there is limited ROM     Neurological: He is alert and oriented to person, place, and time.  No cranial nerve deficit.   Skin: Skin is warm and dry. No rash noted. He is not diaphoretic. No erythema.        Scattered scabbing wounds to various stages on arms and legs    LLE with erythema and swelling (improving)   Psychiatric: He has a normal mood and affect. His behavior is normal. Judgment and thought content normal.   Nursing note and vitals reviewed.    Significant Labs: All pertinent labs within the past 24 hours have been reviewed.  Results for orders placed or performed during the hospital encounter of 05/20/20   Blood culture x two cultures. Draw prior to antibiotics.   Result Value Ref Range    Blood Culture, Routine No Growth to date     Blood Culture, Routine No Growth to date     Blood Culture, Routine No Growth to date     Blood Culture, Routine No Growth to date     Blood Culture, Routine No Growth to date    Blood culture x two cultures. Draw prior to antibiotics.   Result Value Ref Range    Blood Culture, Routine No Growth to date     Blood Culture, Routine No Growth to date     Blood Culture, Routine No Growth to date     Blood Culture, Routine No Growth to date     Blood Culture, Routine No Growth to date    Culture, Anaerobe   Result Value Ref Range    Anaerobic Culture Culture in progress    Aerobic culture   Result Value Ref Range    Aerobic Bacterial Culture (A)      STREPTOCOCCUS PYOGENES (GROUP A)  Moderate  Beta-hemolytic streptococci are routinely susceptible to   penicillins,cephalosporins and carbapenems.  Susceptibility testing not routinely performed     CBC auto differential   Result Value Ref Range    WBC 25.33 (H) 3.90 - 12.70 K/uL    RBC 4.36 (L) 4.60 - 6.20 M/uL    Hemoglobin 12.4 (L) 14.0 - 18.0 g/dL    Hematocrit 37.4 (L) 40.0 - 54.0 %    Mean Corpuscular Volume 86 82 - 98 fL    Mean Corpuscular Hemoglobin 28.4 27.0 - 31.0 pg    Mean Corpuscular Hemoglobin Conc 33.2 32.0 - 36.0 g/dL    RDW 12.9 11.5 - 14.5 %    Platelets 337 150 - 350 K/uL    MPV 9.9 9.2 - 12.9 fL     Immature Granulocytes 0.8 (H) 0.0 - 0.5 %    Gran # (ANC) 22.4 (H) 1.8 - 7.7 K/uL    Immature Grans (Abs) 0.19 (H) 0.00 - 0.04 K/uL    Lymph # 0.8 (L) 1.0 - 4.8 K/uL    Mono # 1.8 (H) 0.3 - 1.0 K/uL    Eos # 0.0 0.0 - 0.5 K/uL    Baso # 0.08 0.00 - 0.20 K/uL    nRBC 0 0 /100 WBC    Gran% 88.4 (H) 38.0 - 73.0 %    Lymph% 3.2 (L) 18.0 - 48.0 %    Mono% 7.3 4.0 - 15.0 %    Eosinophil% 0.0 0.0 - 8.0 %    Basophil% 0.3 0.0 - 1.9 %    Poik Slight     Ovalocytes Occasional     Tear Drop Cells Occasional     Differential Method Automated    Comprehensive metabolic panel   Result Value Ref Range    Sodium 134 (L) 136 - 145 mmol/L    Potassium 4.2 3.5 - 5.1 mmol/L    Chloride 96 95 - 110 mmol/L    CO2 27 23 - 29 mmol/L    Glucose 126 (H) 70 - 110 mg/dL    BUN, Bld 12 6 - 20 mg/dL    Creatinine 0.8 0.5 - 1.4 mg/dL    Calcium 9.4 8.7 - 10.5 mg/dL    Total Protein 7.8 6.0 - 8.4 g/dL    Albumin 2.8 (L) 3.5 - 5.2 g/dL    Total Bilirubin 0.5 0.1 - 1.0 mg/dL    Alkaline Phosphatase 82 55 - 135 U/L    AST 23 10 - 40 U/L    ALT 21 10 - 44 U/L    Anion Gap 11 8 - 16 mmol/L    eGFR if African American >60 >60 mL/min/1.73 m^2    eGFR if non African American >60 >60 mL/min/1.73 m^2   Lactic acid, plasma #1   Result Value Ref Range    Lactate (Lactic Acid) 1.8 0.5 - 2.2 mmol/L   C-reactive protein   Result Value Ref Range    .0 (H) 0.0 - 8.2 mg/L   Sedimentation rate   Result Value Ref Range    Sed Rate 102 (H) 0 - 10 mm/Hr   COVID-19 Rapid Screening   Result Value Ref Range    SARS-CoV-2 RNA, Amplification, Qual Negative Negative   Urinalysis, Reflex to Urine Culture Urine, Clean Catch   Result Value Ref Range    Specimen UA Urine, Clean Catch     Color, UA Yellow Yellow, Straw, Ashley    Appearance, UA Clear Clear    pH, UA 6.0 5.0 - 8.0    Specific Gravity, UA 1.015 1.005 - 1.030    Protein, UA Trace (A) Negative    Glucose, UA Negative Negative    Ketones, UA Negative Negative    Bilirubin (UA) Negative Negative    Occult Blood  UA Trace (A) Negative    Nitrite, UA Negative Negative    Urobilinogen, UA 2.0-3.0 (A) <2.0 EU/dL    Leukocytes, UA Negative Negative   CBC auto differential   Result Value Ref Range    WBC 21.47 (H) 3.90 - 12.70 K/uL    RBC 3.83 (L) 4.60 - 6.20 M/uL    Hemoglobin 10.7 (L) 14.0 - 18.0 g/dL    Hematocrit 33.4 (L) 40.0 - 54.0 %    Mean Corpuscular Volume 87 82 - 98 fL    Mean Corpuscular Hemoglobin 27.9 27.0 - 31.0 pg    Mean Corpuscular Hemoglobin Conc 32.0 32.0 - 36.0 g/dL    RDW 13.5 11.5 - 14.5 %    Platelets 328 150 - 350 K/uL    MPV 10.0 9.2 - 12.9 fL    Immature Granulocytes 0.7 (H) 0.0 - 0.5 %    Gran # (ANC) 18.7 (H) 1.8 - 7.7 K/uL    Immature Grans (Abs) 0.16 (H) 0.00 - 0.04 K/uL    Lymph # 1.1 1.0 - 4.8 K/uL    Mono # 1.5 (H) 0.3 - 1.0 K/uL    Eos # 0.0 0.0 - 0.5 K/uL    Baso # 0.05 0.00 - 0.20 K/uL    nRBC 0 0 /100 WBC    Gran% 87.0 (H) 38.0 - 73.0 %    Lymph% 4.9 (L) 18.0 - 48.0 %    Mono% 7.1 4.0 - 15.0 %    Eosinophil% 0.1 0.0 - 8.0 %    Basophil% 0.2 0.0 - 1.9 %    Differential Method Automated    VANCOMYCIN, TROUGH before 4th dose   Result Value Ref Range    Vancomycin-Trough 3.6 (L) 10.0 - 22.0 ug/mL   CBC auto differential   Result Value Ref Range    WBC 30.35 (H) 3.90 - 12.70 K/uL    RBC 3.98 (L) 4.60 - 6.20 M/uL    Hemoglobin 11.3 (L) 14.0 - 18.0 g/dL    Hematocrit 34.8 (L) 40.0 - 54.0 %    Mean Corpuscular Volume 87 82 - 98 fL    Mean Corpuscular Hemoglobin 28.4 27.0 - 31.0 pg    Mean Corpuscular Hemoglobin Conc 32.5 32.0 - 36.0 g/dL    RDW 13.4 11.5 - 14.5 %    Platelets 414 (H) 150 - 350 K/uL    MPV 9.9 9.2 - 12.9 fL    Immature Granulocytes 0.9 (H) 0.0 - 0.5 %    Gran # (ANC) 28.0 (H) 1.8 - 7.7 K/uL    Immature Grans (Abs) 0.26 (H) 0.00 - 0.04 K/uL    Lymph # 1.0 1.0 - 4.8 K/uL    Mono # 1.1 (H) 0.3 - 1.0 K/uL    Eos # 0.1 0.0 - 0.5 K/uL    Baso # 0.06 0.00 - 0.20 K/uL    nRBC 0 0 /100 WBC    Gran% 92.0 (H) 38.0 - 73.0 %    Lymph% 3.2 (L) 18.0 - 48.0 %    Mono% 3.5 (L) 4.0 - 15.0 %     Eosinophil% 0.2 0.0 - 8.0 %    Basophil% 0.2 0.0 - 1.9 %    Platelet Estimate Increased (A)     Poik Slight     Ovalocytes Occasional     Tear Drop Cells Occasional     Vacuolated Granulocytes Present     Differential Method Automated    Comprehensive metabolic panel   Result Value Ref Range    Sodium 138 136 - 145 mmol/L    Potassium 4.2 3.5 - 5.1 mmol/L    Chloride 100 95 - 110 mmol/L    CO2 27 23 - 29 mmol/L    Glucose 139 (H) 70 - 110 mg/dL    BUN, Bld 7 6 - 20 mg/dL    Creatinine 0.7 0.5 - 1.4 mg/dL    Calcium 9.2 8.7 - 10.5 mg/dL    Total Protein 7.2 6.0 - 8.4 g/dL    Albumin 2.3 (L) 3.5 - 5.2 g/dL    Total Bilirubin 0.3 0.1 - 1.0 mg/dL    Alkaline Phosphatase 96 55 - 135 U/L    AST 19 10 - 40 U/L    ALT 23 10 - 44 U/L    Anion Gap 11 8 - 16 mmol/L    eGFR if African American >60 >60 mL/min/1.73 m^2    eGFR if non African American >60 >60 mL/min/1.73 m^2   CBC auto differential   Result Value Ref Range    WBC 16.36 (H) 3.90 - 12.70 K/uL    RBC 3.57 (L) 4.60 - 6.20 M/uL    Hemoglobin 10.0 (L) 14.0 - 18.0 g/dL    Hematocrit 32.0 (L) 40.0 - 54.0 %    Mean Corpuscular Volume 90 82 - 98 fL    Mean Corpuscular Hemoglobin 28.0 27.0 - 31.0 pg    Mean Corpuscular Hemoglobin Conc 31.3 (L) 32.0 - 36.0 g/dL    RDW 13.7 11.5 - 14.5 %    Platelets 377 (H) 150 - 350 K/uL    MPV 10.7 9.2 - 12.9 fL    Immature Granulocytes 1.8 (H) 0.0 - 0.5 %    Gran # (ANC) 12.1 (H) 1.8 - 7.7 K/uL    Immature Grans (Abs) 0.29 (H) 0.00 - 0.04 K/uL    Lymph # 2.8 1.0 - 4.8 K/uL    Mono # 1.1 (H) 0.3 - 1.0 K/uL    Eos # 0.1 0.0 - 0.5 K/uL    Baso # 0.11 0.00 - 0.20 K/uL    nRBC 0 0 /100 WBC    Gran% 73.9 (H) 38.0 - 73.0 %    Lymph% 16.8 (L) 18.0 - 48.0 %    Mono% 6.5 4.0 - 15.0 %    Eosinophil% 0.3 0.0 - 8.0 %    Basophil% 0.7 0.0 - 1.9 %    Differential Method Automated    Comprehensive metabolic panel   Result Value Ref Range    Sodium 137 136 - 145 mmol/L    Potassium 3.9 3.5 - 5.1 mmol/L    Chloride 100 95 - 110 mmol/L    CO2 30 (H) 23 - 29  mmol/L    Glucose 126 (H) 70 - 110 mg/dL    BUN, Bld 8 6 - 20 mg/dL    Creatinine 0.7 0.5 - 1.4 mg/dL    Calcium 8.6 (L) 8.7 - 10.5 mg/dL    Total Protein 6.5 6.0 - 8.4 g/dL    Albumin 2.1 (L) 3.5 - 5.2 g/dL    Total Bilirubin 0.2 0.1 - 1.0 mg/dL    Alkaline Phosphatase 65 55 - 135 U/L    AST 33 10 - 40 U/L    ALT 47 (H) 10 - 44 U/L    Anion Gap 7 (L) 8 - 16 mmol/L    eGFR if African American >60 >60 mL/min/1.73 m^2    eGFR if non African American >60 >60 mL/min/1.73 m^2   VANCOMYCIN, TROUGH before 3rd dose   Result Value Ref Range    Vancomycin-Trough 10.5 10.0 - 22.0 ug/mL   CBC auto differential   Result Value Ref Range    WBC 12.11 3.90 - 12.70 K/uL    RBC 4.01 (L) 4.60 - 6.20 M/uL    Hemoglobin 11.3 (L) 14.0 - 18.0 g/dL    Hematocrit 35.4 (L) 40.0 - 54.0 %    Mean Corpuscular Volume 88 82 - 98 fL    Mean Corpuscular Hemoglobin 28.2 27.0 - 31.0 pg    Mean Corpuscular Hemoglobin Conc 31.9 (L) 32.0 - 36.0 g/dL    RDW 13.2 11.5 - 14.5 %    Platelets 514 (H) 150 - 350 K/uL    MPV 9.0 (L) 9.2 - 12.9 fL    Immature Granulocytes 4.5 (H) 0.0 - 0.5 %    Gran # (ANC) 7.5 1.8 - 7.7 K/uL    Immature Grans (Abs) 0.54 (H) 0.00 - 0.04 K/uL    Lymph # 2.8 1.0 - 4.8 K/uL    Mono # 1.0 0.3 - 1.0 K/uL    Eos # 0.2 0.0 - 0.5 K/uL    Baso # 0.11 0.00 - 0.20 K/uL    nRBC 0 0 /100 WBC    Gran% 61.9 38.0 - 73.0 %    Lymph% 23.3 18.0 - 48.0 %    Mono% 8.1 4.0 - 15.0 %    Eosinophil% 1.3 0.0 - 8.0 %    Basophil% 0.9 0.0 - 1.9 %    Differential Method Automated    Comprehensive metabolic panel   Result Value Ref Range    Sodium 133 (L) 136 - 145 mmol/L    Potassium 4.4 3.5 - 5.1 mmol/L    Chloride 97 95 - 110 mmol/L    CO2 32 (H) 23 - 29 mmol/L    Glucose 103 70 - 110 mg/dL    BUN, Bld 5 (L) 6 - 20 mg/dL    Creatinine 0.6 0.5 - 1.4 mg/dL    Calcium 9.5 8.7 - 10.5 mg/dL    Total Protein 7.0 6.0 - 8.4 g/dL    Albumin 2.4 (L) 3.5 - 5.2 g/dL    Total Bilirubin 0.3 0.1 - 1.0 mg/dL    Alkaline Phosphatase 58 55 - 135 U/L    AST 16 10 - 40 U/L     ALT 36 10 - 44 U/L    Anion Gap 4 (L) 8 - 16 mmol/L    eGFR if African American >60 >60 mL/min/1.73 m^2    eGFR if non African American >60 >60 mL/min/1.73 m^2   VANCOMYCIN, TROUGH before 4th dose   Result Value Ref Range    Vancomycin-Trough 13.5 10.0 - 22.0 ug/mL   VANCOMYCIN, TROUGH before 4th dose   Result Value Ref Range    Vancomycin-Trough 15.2 10.0 - 22.0 ug/mL     Significant Imaging: I have reviewed all pertinent imaging results/findings within the past 24 hours.   Imaging Results          X-Ray Knee Complete 4 or More Views Left (Final result)  Result time 05/20/20 12:44:53    Final result by Farzad Jasmine MD (05/20/20 12:44:53)                 Impression:      As above.      Electronically signed by: Farzad Jasmine  Date:    05/20/2020  Time:    12:44             Narrative:    EXAMINATION:  XR KNEE COMP 4 OR MORE VIEWS LEFT    CLINICAL HISTORY:  Effusion, left knee    TECHNIQUE:  AP, lateral, and Merchant views of the left knee were performed.    COMPARISON:  03/06/2020    FINDINGS:  Prominent soft tissue swelling anterior to the proximal tibia, hematoma versus fluid collection versus focal edema.  No suprapatellar knee joint effusion.  No appreciable fracture or dislocation.  Chronic moderate medial compartmental joint space narrowing.  Generalized edema about the knee soft tissues raising question of cellulitis.                                    Assessment/Plan:      * Sepsis  - Likely 2/2 left knee and left ankle abscess   - BC show NGTD  - Would cx shows strep pyogens  - Continue vancomycin and cefepime  - pharm D following for vanc dosing  - continue IVFs  - remains hemodynamically stable            Abscess of left knee  - S/p incision and drainage of left leg pretibial abscess and wound vac placement by Dr. Ramirez.  - Wound cx shows strep pyogenes  - Continue vancomycin and cefepime  - Will d/w ID to determine ABX plan upon DC.  - PRN analgesia  - CM following to arrange for HH for wound care  and VAC management.   - Will need outpatient follow-up with ortho for monitoring upon DC        Polysubstance abuse  - Pt reports THC use  - Encouraged cessation      Cerebral palsy  - WC bound  - Resume home Baclofen today given complaints of spasms  - Outpatient follow up      Cellulitis of left lower extremity  - Improving  - continue vancomycin and cefepime  - see plan as per above    Abscess of ankle  - S/p incision and drainage of left ankle medial malleolus abscess and wound vac placement by Dr. Ramirez.  - continue local wound care per Ortho recs  - Continue ABX (see plan as per above)  - PRN analgesia        VTE Risk Mitigation (From admission, onward)         Ordered     Place sequential compression device  Until discontinued      05/20/20 8158                      Irma Mehta NP  Department of Hospital Medicine   [facility]

## 2020-05-26 VITALS
RESPIRATION RATE: 18 BRPM | DIASTOLIC BLOOD PRESSURE: 77 MMHG | TEMPERATURE: 98 F | HEART RATE: 91 BPM | SYSTOLIC BLOOD PRESSURE: 165 MMHG | OXYGEN SATURATION: 99 % | BODY MASS INDEX: 24.52 KG/M2 | HEIGHT: 68 IN | WEIGHT: 161.81 LBS

## 2020-05-26 LAB — BACTERIA SPEC ANAEROBE CULT: NORMAL

## 2020-05-26 PROCEDURE — 63600175 PHARM REV CODE 636 W HCPCS: Performed by: INTERNAL MEDICINE

## 2020-05-26 PROCEDURE — 25000003 PHARM REV CODE 250: Performed by: INTERNAL MEDICINE

## 2020-05-26 PROCEDURE — 25000003 PHARM REV CODE 250: Performed by: NURSE PRACTITIONER

## 2020-05-26 PROCEDURE — G0378 HOSPITAL OBSERVATION PER HR: HCPCS

## 2020-05-26 RX ORDER — HYDROCODONE BITARTRATE AND ACETAMINOPHEN 5; 325 MG/1; MG/1
1 TABLET ORAL EVERY 8 HOURS PRN
Qty: 20 TABLET | Refills: 0 | Status: SHIPPED | OUTPATIENT
Start: 2020-05-26

## 2020-05-26 RX ORDER — AMOXICILLIN AND CLAVULANATE POTASSIUM 875; 125 MG/1; MG/1
1 TABLET, FILM COATED ORAL EVERY 12 HOURS
Qty: 60 TABLET | Refills: 1 | Status: SHIPPED | OUTPATIENT
Start: 2020-06-02 | End: 2020-07-07

## 2020-05-26 RX ORDER — CLINDAMYCIN HYDROCHLORIDE 300 MG/1
300 CAPSULE ORAL EVERY 6 HOURS
Qty: 28 CAPSULE | Refills: 0 | Status: SHIPPED | OUTPATIENT
Start: 2020-05-26 | End: 2020-06-02

## 2020-05-26 RX ADMIN — HYDROCODONE BITARTRATE AND ACETAMINOPHEN 1 TABLET: 5; 325 TABLET ORAL at 04:05

## 2020-05-26 RX ADMIN — HYDROCODONE BITARTRATE AND ACETAMINOPHEN 1 TABLET: 5; 325 TABLET ORAL at 08:05

## 2020-05-26 RX ADMIN — BACLOFEN 20 MG: 10 TABLET ORAL at 08:05

## 2020-05-26 RX ADMIN — CLINDAMYCIN IN 5 PERCENT DEXTROSE 600 MG: 12 INJECTION, SOLUTION INTRAVENOUS at 09:05

## 2020-05-26 RX ADMIN — CEFEPIME HYDROCHLORIDE 1 G: 1 INJECTION, SOLUTION INTRAVENOUS at 08:05

## 2020-05-26 RX ADMIN — HYDROCODONE BITARTRATE AND ACETAMINOPHEN 1 TABLET: 5; 325 TABLET ORAL at 01:05

## 2020-05-26 RX ADMIN — CLINDAMYCIN IN 5 PERCENT DEXTROSE 600 MG: 12 INJECTION, SOLUTION INTRAVENOUS at 02:05

## 2020-05-26 RX ADMIN — GABAPENTIN 300 MG: 300 CAPSULE ORAL at 08:05

## 2020-05-26 NOTE — PLAN OF CARE
05/26/20 1528   Final Note   Assessment Type Final Discharge Note   Anticipated Discharge Disposition Home-Health   Right Care Referral Info   Post Acute Recommendation Home-care   Facility Name [facility]

## 2020-05-26 NOTE — PLAN OF CARE
Home health set up with [facility] once the ambulatory home health order placed.       05/26/20 1001   Post-Acute Status   Post-Acute Authorization Home Health   Home Health Status Set-up Complete   Discharge Delays (!) Other

## 2020-05-26 NOTE — DISCHARGE SUMMARY
"[facility] Hospital Medicine  Discharge Summary      Patient Name: David Mohr  MRN: [id number]  Admission Date: 5/20/2020  Hospital Length of Stay: 1 days  Discharge Date and Time:  05/26/2020 11:00 AM  Attending Physician: Damir Lizarraga MD   Discharging Provider: Irma Mehta NP  Primary Care Provider: No primary care provider on file.      HPI:   David Mohr is a 26 yo male with PMHx of Cerebral Palsy, poly substance abuse, depression, anxiety and seizure disorder, who presentsedo the ED with reports of swelling to the left anterior knee for "a few days."   Pt states he developed another abscess to the left anterior knee and tried to open with a needle. He developed increasing pain and redness to the left leg therefore, presented for evaluation. Temp max 100.0. Also, pt describes an abscess to the left medial ankle but cannot give a time frame.   Vital signs note normal temp, pulse 123, resp 20, B/P 123/71 and pulse ox 97%.  Knee xray - prominent soft tissue swelling anterior to the proximal tibia, hematoma versus fluid collection versus focal edema.   ED provider performed I & D to left anterior knee, packing is present and ACE wrap noted. Pt is admitted due to SIRS, left lower leg cellulitis and left medial ankle abscess.     Procedure(s) (LRB):  INCISION AND DRAINAGE, LOWER EXTREMITY (Left)  APPLICATION, WOUND VAC (Left)      Hospital Course:   25 year old male admitted for left knee and ankle abscess. Patient being treated with IV vancomycin/cefepime. Orthopedics consulted. As of 5/21/20 pt s/p incision and drainage of left leg pretibial and left ankle medial malleolus abscess and wound vac placement by Dr. Ramirez. Patient tolerated well. WBCs trending downward. Continue IV abx therapy. As of 5/22 POD #1, pain controlled.  WBCs 30.35k up from 21.47k. Likely reactive/stress related. Monitor closely. Continue IV vancomycin/cefepime. Blood cx remain negative and pt afebrile.  Wound " cx pending. As of 5/23 POD #2 pt lying in bed, pain controlled. WBCs 16.36k down from 30.35k. Social work consult to arrange for HH for wound care and VAC management.     As of 5/24 patient reports to be expected postoperative pain to left lower extremity, currently rates 4/10, no other complaints.  CM following to arrange wound VAC and home health upon DC.  Leukocytosis has resolved and patient remains afebrile.  BC show NGTD.  Wound cx collected on 5/21 shows strep pyogens.  Patient currently on cefepime and vancomycin.  Will d/w ID to determine ABX plan upon DC.  Anticipate DC home once wound vac is available, and if ok with ortho.    5/25/20 - Aerobic culture streptococcus pyogenes. Will plan on D/c with [facility] for wound vac, Abx.     5/26/20 - Dr. Cm reviewed chart. CT of left leg showed no Osteomyelitis, but he wants to treat with Abx for presumed Osteomyelitis: 1 week of Clindamycin 300 mg q 6 hours, then 5 weeks of Augmentin 875 BID. He has a History of IV Drug Abuse, so po Abx are preferred. He will have followup with Dr. Pinzon and Dr. Cm. [facility]  has been set up for daily wound care to left ankle and MWF Wound Vac changes. Patient seen and examined and deemed stable for d/c.        Consults:   Consults (From admission, onward)        Status Ordering Provider     Inpatient consult to Orthopedic Surgery  Once     Provider:  (Not yet assigned)    Acknowledged KAYLEN CHOPRA     Inpatient consult to Social Work  Once     Provider:  (Not yet assigned)    Completed YOHANA TINEO     Inpatient consult to Social Work  Once     Provider:  (Not yet assigned)    Completed DONELL WATT          No new Assessment & Plan notes have been filed under this hospital service since the last note was generated.  Service: Hospital Medicine    Final Active Diagnoses:    Diagnosis Date Noted POA    PRINCIPAL PROBLEM:  Sepsis [A41.9] 05/20/2020 Yes    Abscess of left knee [L02.416] 05/21/2020  Yes    Cerebral palsy [G80.9] 05/20/2020 Yes    Polysubstance abuse [F19.10] 05/20/2020 Yes    Cellulitis of left lower extremity [L03.116] 05/20/2020 Yes    Abscess of ankle [L02.419]  Yes      Problems Resolved During this Admission:       Discharged Condition: stable    Disposition: Home or Self Care    Follow Up:  Follow-up Information     [facility].    Specialty:  Home Health Services  Why:  Home Health  Contact information:  [address]  [address]  [place] [address]  [phone]             Damir Lizarraga MD In 1 week.    Specialty:  Infectious Diseases  Contact information:  [id number] [place] [address] [place] [address]  [phone]             Schedule an appointment as soon as possible for a visit with José Pinzon DO.    Specialty:  Orthopedic Surgery  Why:  10-14 days   Contact information:  [address] [place] [address]   [place] [address]  [phone]                 Patient Instructions:      Referral to Home health   Referral Priority: Routine Referral Type: Home Health   Referral Reason: Specialty Services Required   Requested Specialty: Home Health Services   Number of Visits Requested: 1     Diet Adult Regular     Diet Adult Regular       Significant Diagnostic Studies:   Results for orders placed or performed during the hospital encounter of 05/20/20   Blood culture x two cultures. Draw prior to antibiotics.   Result Value Ref Range    Blood Culture, Routine No growth after 5 days.    Blood culture x two cultures. Draw prior to antibiotics.   Result Value Ref Range    Blood Culture, Routine No growth after 5 days.    Culture, Anaerobe   Result Value Ref Range    Anaerobic Culture No anaerobes isolated    Aerobic culture   Result Value Ref Range    Aerobic Bacterial Culture (A)      STREPTOCOCCUS PYOGENES (GROUP A)  Moderate  Beta-hemolytic streptococci are routinely susceptible to   penicillins,cephalosporins and carbapenems.  Susceptibility  testing not routinely performed     CBC auto differential   Result Value Ref Range    WBC 25.33 (H) 3.90 - 12.70 K/uL    RBC 4.36 (L) 4.60 - 6.20 M/uL    Hemoglobin 12.4 (L) 14.0 - 18.0 g/dL    Hematocrit 37.4 (L) 40.0 - 54.0 %    Mean Corpuscular Volume 86 82 - 98 fL    Mean Corpuscular Hemoglobin 28.4 27.0 - 31.0 pg    Mean Corpuscular Hemoglobin Conc 33.2 32.0 - 36.0 g/dL    RDW 12.9 11.5 - 14.5 %    Platelets 337 150 - 350 K/uL    MPV 9.9 9.2 - 12.9 fL    Immature Granulocytes 0.8 (H) 0.0 - 0.5 %    Gran # (ANC) 22.4 (H) 1.8 - 7.7 K/uL    Immature Grans (Abs) 0.19 (H) 0.00 - 0.04 K/uL    Lymph # 0.8 (L) 1.0 - 4.8 K/uL    Mono # 1.8 (H) 0.3 - 1.0 K/uL    Eos # 0.0 0.0 - 0.5 K/uL    Baso # 0.08 0.00 - 0.20 K/uL    nRBC 0 0 /100 WBC    Gran% 88.4 (H) 38.0 - 73.0 %    Lymph% 3.2 (L) 18.0 - 48.0 %    Mono% 7.3 4.0 - 15.0 %    Eosinophil% 0.0 0.0 - 8.0 %    Basophil% 0.3 0.0 - 1.9 %    Poik Slight     Ovalocytes Occasional     Tear Drop Cells Occasional     Differential Method Automated    Comprehensive metabolic panel   Result Value Ref Range    Sodium 134 (L) 136 - 145 mmol/L    Potassium 4.2 3.5 - 5.1 mmol/L    Chloride 96 95 - 110 mmol/L    CO2 27 23 - 29 mmol/L    Glucose 126 (H) 70 - 110 mg/dL    BUN, Bld 12 6 - 20 mg/dL    Creatinine 0.8 0.5 - 1.4 mg/dL    Calcium 9.4 8.7 - 10.5 mg/dL    Total Protein 7.8 6.0 - 8.4 g/dL    Albumin 2.8 (L) 3.5 - 5.2 g/dL    Total Bilirubin 0.5 0.1 - 1.0 mg/dL    Alkaline Phosphatase 82 55 - 135 U/L    AST 23 10 - 40 U/L    ALT 21 10 - 44 U/L    Anion Gap 11 8 - 16 mmol/L    eGFR if African American >60 >60 mL/min/1.73 m^2    eGFR if non African American >60 >60 mL/min/1.73 m^2   Lactic acid, plasma #1   Result Value Ref Range    Lactate (Lactic Acid) 1.8 0.5 - 2.2 mmol/L   C-reactive protein   Result Value Ref Range    .0 (H) 0.0 - 8.2 mg/L   Sedimentation rate   Result Value Ref Range    Sed Rate 102 (H) 0 - 10 mm/Hr   COVID-19 Rapid Screening   Result Value Ref Range     SARS-CoV-2 RNA, Amplification, Qual Negative Negative   Urinalysis, Reflex to Urine Culture Urine, Clean Catch   Result Value Ref Range    Specimen UA Urine, Clean Catch     Color, UA Yellow Yellow, Straw, Ashley    Appearance, UA Clear Clear    pH, UA 6.0 5.0 - 8.0    Specific Gravity, UA 1.015 1.005 - 1.030    Protein, UA Trace (A) Negative    Glucose, UA Negative Negative    Ketones, UA Negative Negative    Bilirubin (UA) Negative Negative    Occult Blood UA Trace (A) Negative    Nitrite, UA Negative Negative    Urobilinogen, UA 2.0-3.0 (A) <2.0 EU/dL    Leukocytes, UA Negative Negative   CBC auto differential   Result Value Ref Range    WBC 21.47 (H) 3.90 - 12.70 K/uL    RBC 3.83 (L) 4.60 - 6.20 M/uL    Hemoglobin 10.7 (L) 14.0 - 18.0 g/dL    Hematocrit 33.4 (L) 40.0 - 54.0 %    Mean Corpuscular Volume 87 82 - 98 fL    Mean Corpuscular Hemoglobin 27.9 27.0 - 31.0 pg    Mean Corpuscular Hemoglobin Conc 32.0 32.0 - 36.0 g/dL    RDW 13.5 11.5 - 14.5 %    Platelets 328 150 - 350 K/uL    MPV 10.0 9.2 - 12.9 fL    Immature Granulocytes 0.7 (H) 0.0 - 0.5 %    Gran # (ANC) 18.7 (H) 1.8 - 7.7 K/uL    Immature Grans (Abs) 0.16 (H) 0.00 - 0.04 K/uL    Lymph # 1.1 1.0 - 4.8 K/uL    Mono # 1.5 (H) 0.3 - 1.0 K/uL    Eos # 0.0 0.0 - 0.5 K/uL    Baso # 0.05 0.00 - 0.20 K/uL    nRBC 0 0 /100 WBC    Gran% 87.0 (H) 38.0 - 73.0 %    Lymph% 4.9 (L) 18.0 - 48.0 %    Mono% 7.1 4.0 - 15.0 %    Eosinophil% 0.1 0.0 - 8.0 %    Basophil% 0.2 0.0 - 1.9 %    Differential Method Automated    VANCOMYCIN, TROUGH before 4th dose   Result Value Ref Range    Vancomycin-Trough 3.6 (L) 10.0 - 22.0 ug/mL   CBC auto differential   Result Value Ref Range    WBC 30.35 (H) 3.90 - 12.70 K/uL    RBC 3.98 (L) 4.60 - 6.20 M/uL    Hemoglobin 11.3 (L) 14.0 - 18.0 g/dL    Hematocrit 34.8 (L) 40.0 - 54.0 %    Mean Corpuscular Volume 87 82 - 98 fL    Mean Corpuscular Hemoglobin 28.4 27.0 - 31.0 pg    Mean Corpuscular Hemoglobin Conc 32.5 32.0 - 36.0 g/dL    RDW  13.4 11.5 - 14.5 %    Platelets 414 (H) 150 - 350 K/uL    MPV 9.9 9.2 - 12.9 fL    Immature Granulocytes 0.9 (H) 0.0 - 0.5 %    Gran # (ANC) 28.0 (H) 1.8 - 7.7 K/uL    Immature Grans (Abs) 0.26 (H) 0.00 - 0.04 K/uL    Lymph # 1.0 1.0 - 4.8 K/uL    Mono # 1.1 (H) 0.3 - 1.0 K/uL    Eos # 0.1 0.0 - 0.5 K/uL    Baso # 0.06 0.00 - 0.20 K/uL    nRBC 0 0 /100 WBC    Gran% 92.0 (H) 38.0 - 73.0 %    Lymph% 3.2 (L) 18.0 - 48.0 %    Mono% 3.5 (L) 4.0 - 15.0 %    Eosinophil% 0.2 0.0 - 8.0 %    Basophil% 0.2 0.0 - 1.9 %    Platelet Estimate Increased (A)     Poik Slight     Ovalocytes Occasional     Tear Drop Cells Occasional     Vacuolated Granulocytes Present     Differential Method Automated    Comprehensive metabolic panel   Result Value Ref Range    Sodium 138 136 - 145 mmol/L    Potassium 4.2 3.5 - 5.1 mmol/L    Chloride 100 95 - 110 mmol/L    CO2 27 23 - 29 mmol/L    Glucose 139 (H) 70 - 110 mg/dL    BUN, Bld 7 6 - 20 mg/dL    Creatinine 0.7 0.5 - 1.4 mg/dL    Calcium 9.2 8.7 - 10.5 mg/dL    Total Protein 7.2 6.0 - 8.4 g/dL    Albumin 2.3 (L) 3.5 - 5.2 g/dL    Total Bilirubin 0.3 0.1 - 1.0 mg/dL    Alkaline Phosphatase 96 55 - 135 U/L    AST 19 10 - 40 U/L    ALT 23 10 - 44 U/L    Anion Gap 11 8 - 16 mmol/L    eGFR if African American >60 >60 mL/min/1.73 m^2    eGFR if non African American >60 >60 mL/min/1.73 m^2   CBC auto differential   Result Value Ref Range    WBC 16.36 (H) 3.90 - 12.70 K/uL    RBC 3.57 (L) 4.60 - 6.20 M/uL    Hemoglobin 10.0 (L) 14.0 - 18.0 g/dL    Hematocrit 32.0 (L) 40.0 - 54.0 %    Mean Corpuscular Volume 90 82 - 98 fL    Mean Corpuscular Hemoglobin 28.0 27.0 - 31.0 pg    Mean Corpuscular Hemoglobin Conc 31.3 (L) 32.0 - 36.0 g/dL    RDW 13.7 11.5 - 14.5 %    Platelets 377 (H) 150 - 350 K/uL    MPV 10.7 9.2 - 12.9 fL    Immature Granulocytes 1.8 (H) 0.0 - 0.5 %    Gran # (ANC) 12.1 (H) 1.8 - 7.7 K/uL    Immature Grans (Abs) 0.29 (H) 0.00 - 0.04 K/uL    Lymph # 2.8 1.0 - 4.8 K/uL    Mono # 1.1 (H)  0.3 - 1.0 K/uL    Eos # 0.1 0.0 - 0.5 K/uL    Baso # 0.11 0.00 - 0.20 K/uL    nRBC 0 0 /100 WBC    Gran% 73.9 (H) 38.0 - 73.0 %    Lymph% 16.8 (L) 18.0 - 48.0 %    Mono% 6.5 4.0 - 15.0 %    Eosinophil% 0.3 0.0 - 8.0 %    Basophil% 0.7 0.0 - 1.9 %    Differential Method Automated    Comprehensive metabolic panel   Result Value Ref Range    Sodium 137 136 - 145 mmol/L    Potassium 3.9 3.5 - 5.1 mmol/L    Chloride 100 95 - 110 mmol/L    CO2 30 (H) 23 - 29 mmol/L    Glucose 126 (H) 70 - 110 mg/dL    BUN, Bld 8 6 - 20 mg/dL    Creatinine 0.7 0.5 - 1.4 mg/dL    Calcium 8.6 (L) 8.7 - 10.5 mg/dL    Total Protein 6.5 6.0 - 8.4 g/dL    Albumin 2.1 (L) 3.5 - 5.2 g/dL    Total Bilirubin 0.2 0.1 - 1.0 mg/dL    Alkaline Phosphatase 65 55 - 135 U/L    AST 33 10 - 40 U/L    ALT 47 (H) 10 - 44 U/L    Anion Gap 7 (L) 8 - 16 mmol/L    eGFR if African American >60 >60 mL/min/1.73 m^2    eGFR if non African American >60 >60 mL/min/1.73 m^2   VANCOMYCIN, TROUGH before 3rd dose   Result Value Ref Range    Vancomycin-Trough 10.5 10.0 - 22.0 ug/mL   CBC auto differential   Result Value Ref Range    WBC 12.11 3.90 - 12.70 K/uL    RBC 4.01 (L) 4.60 - 6.20 M/uL    Hemoglobin 11.3 (L) 14.0 - 18.0 g/dL    Hematocrit 35.4 (L) 40.0 - 54.0 %    Mean Corpuscular Volume 88 82 - 98 fL    Mean Corpuscular Hemoglobin 28.2 27.0 - 31.0 pg    Mean Corpuscular Hemoglobin Conc 31.9 (L) 32.0 - 36.0 g/dL    RDW 13.2 11.5 - 14.5 %    Platelets 514 (H) 150 - 350 K/uL    MPV 9.0 (L) 9.2 - 12.9 fL    Immature Granulocytes 4.5 (H) 0.0 - 0.5 %    Gran # (ANC) 7.5 1.8 - 7.7 K/uL    Immature Grans (Abs) 0.54 (H) 0.00 - 0.04 K/uL    Lymph # 2.8 1.0 - 4.8 K/uL    Mono # 1.0 0.3 - 1.0 K/uL    Eos # 0.2 0.0 - 0.5 K/uL    Baso # 0.11 0.00 - 0.20 K/uL    nRBC 0 0 /100 WBC    Gran% 61.9 38.0 - 73.0 %    Lymph% 23.3 18.0 - 48.0 %    Mono% 8.1 4.0 - 15.0 %    Eosinophil% 1.3 0.0 - 8.0 %    Basophil% 0.9 0.0 - 1.9 %    Differential Method Automated    Comprehensive metabolic  panel   Result Value Ref Range    Sodium 133 (L) 136 - 145 mmol/L    Potassium 4.4 3.5 - 5.1 mmol/L    Chloride 97 95 - 110 mmol/L    CO2 32 (H) 23 - 29 mmol/L    Glucose 103 70 - 110 mg/dL    BUN, Bld 5 (L) 6 - 20 mg/dL    Creatinine 0.6 0.5 - 1.4 mg/dL    Calcium 9.5 8.7 - 10.5 mg/dL    Total Protein 7.0 6.0 - 8.4 g/dL    Albumin 2.4 (L) 3.5 - 5.2 g/dL    Total Bilirubin 0.3 0.1 - 1.0 mg/dL    Alkaline Phosphatase 58 55 - 135 U/L    AST 16 10 - 40 U/L    ALT 36 10 - 44 U/L    Anion Gap 4 (L) 8 - 16 mmol/L    eGFR if African American >60 >60 mL/min/1.73 m^2    eGFR if non African American >60 >60 mL/min/1.73 m^2   VANCOMYCIN, TROUGH before 4th dose   Result Value Ref Range    Vancomycin-Trough 13.5 10.0 - 22.0 ug/mL   VANCOMYCIN, TROUGH before 4th dose   Result Value Ref Range    Vancomycin-Trough 15.2 10.0 - 22.0 ug/mL     Pending Diagnostic Studies:     None         Medications:  Reconciled Home Medications:      Medication List      START taking these medications    amoxicillin-clavulanate 875-125mg 875-125 mg per tablet  Commonly known as:  AUGMENTIN  Take 1 tablet by mouth every 12 (twelve) hours. Start June 2, 2020 after clindamycin is completed.  Start taking on:  June 2, 2020     clindamycin 300 MG capsule  Commonly known as:  CLEOCIN  Take 1 capsule (300 mg total) by mouth every 6 (six) hours. for 7 days     * HYDROcodone-acetaminophen 5-325 mg per tablet  Commonly known as:  NORCO  Take 1 tablet by mouth every 8 (eight) hours as needed.     * HYDROcodone-acetaminophen 5-325 mg per tablet  Commonly known as:  NORCO  Take 1 tablet by mouth every 8 (eight) hours as needed for Pain.         * This list has 2 medication(s) that are the same as other medications prescribed for you. Read the directions carefully, and ask your doctor or other care provider to review them with you.            CONTINUE taking these medications    baclofen 20 MG tablet  Commonly known as:  LIORESAL  Take 1 tablet (20 mg total) by  mouth 4 (four) times daily.     docusate sodium 100 MG capsule  Commonly known as:  COLACE  Take 100 mg by mouth.     gabapentin 300 MG capsule  Commonly known as:  NEURONTIN  Take 1 capsule (300 mg total) by mouth 3 (three) times daily.        STOP taking these medications    tiZANidine 4 mg Cap            Indwelling Lines/Drains at time of discharge:   Lines/Drains/Airways     None                 Time spent on the discharge of patient: 45 minutes  Patient was seen and examined on the date of discharge and determined to be suitable for discharge.         Irma Mehta NP  Department of Hospital Medicine  [facility]

## 2020-05-26 NOTE — PLAN OF CARE
Pt to be dc home today w home wound vac once home health set up and medications delivered. Pt resting and pain controlled. 12 hour chart check completed.

## 2020-05-26 NOTE — PLAN OF CARE
Problem: Adult Inpatient Plan of Care  Goal: Plan of Care Review  Outcome: Ongoing, Progressing     POC reviewed, including indications and possible side effects of administered medications. Patient verbalized understanding and teach back. No adverse reactions noted. Patient c/o LLE pain. Administered medications per order. Wound vac in place. VSS. NADN. Patient remains free of falls and injuries during shift. Will continue to monitor.    Chart check complete.

## 2020-05-26 NOTE — PROGRESS NOTES
Discharge instructions given to patient per Yandy GONZALES primary nurse. Patient verbalized understanding. Wound vac placed for home use. Off floor via wheelchair and wound vac supplies

## 2020-05-27 ENCOUNTER — TELEPHONE (OUTPATIENT)
Dept: ORTHOPEDICS | Facility: CLINIC | Age: 25
End: 2020-05-27

## 2020-06-03 ENCOUNTER — NURSE TRIAGE (OUTPATIENT)
Dept: ADMINISTRATIVE | Facility: CLINIC | Age: 25
End: 2020-06-03

## 2020-06-03 NOTE — TELEPHONE ENCOUNTER
Reason for Disposition   Unable to complete triage due to phone connection issues    Protocols used: NO CONTACT OR DUPLICATE CONTACT CALL-A-AH

## 2020-06-04 NOTE — TELEPHONE ENCOUNTER
Spoke with patient's mother on behalf of Post Procedural Symptom tracker and she states her son is not experiencing any cough, fever or difficulty breathing.      Reason for Disposition   Health Information question, no triage required and triager able to answer question    Additional Information   Negative: [1] Caller is not with the adult (patient) AND [2] reporting urgent symptoms   Negative: Lab result questions   Negative: Medication questions   Negative: Caller can't be reached by phone   Negative: Caller has already spoken to PCP or another triager   Negative: RN needs further essential information from caller in order to complete triage   Negative: Requesting regular office appointment   Negative: [1] Caller requesting NON-URGENT health information AND [2] PCP's office is the best resource    Protocols used: INFORMATION ONLY CALL-A-

## 2020-06-09 ENCOUNTER — EXTERNAL HOME HEALTH (OUTPATIENT)
Dept: HOME HEALTH SERVICES | Facility: HOSPITAL | Age: 25
End: 2020-06-09
Payer: COMMERCIAL

## 2020-08-03 ENCOUNTER — OFFICE VISIT (OUTPATIENT)
Dept: FAMILY MEDICINE | Facility: CLINIC | Age: 25
End: 2020-08-03
Payer: COMMERCIAL

## 2020-08-03 VITALS
BODY MASS INDEX: 23.54 KG/M2 | OXYGEN SATURATION: 99 % | SYSTOLIC BLOOD PRESSURE: 122 MMHG | TEMPERATURE: 99 F | HEART RATE: 128 BPM | HEIGHT: 68 IN | WEIGHT: 155.31 LBS | RESPIRATION RATE: 16 BRPM | DIASTOLIC BLOOD PRESSURE: 84 MMHG

## 2020-08-03 DIAGNOSIS — G80.8 OTHER CEREBRAL PALSY: Primary | ICD-10-CM

## 2020-08-03 DIAGNOSIS — M25.561 ACUTE PAIN OF RIGHT KNEE: ICD-10-CM

## 2020-08-03 DIAGNOSIS — Z72.0 TOBACCO ABUSE: ICD-10-CM

## 2020-08-03 DIAGNOSIS — F19.10 POLYSUBSTANCE ABUSE: ICD-10-CM

## 2020-08-03 DIAGNOSIS — F99 PSYCHIATRIC DISORDER: ICD-10-CM

## 2020-08-03 DIAGNOSIS — G40.909 SEIZURE DISORDER: ICD-10-CM

## 2020-08-03 PROCEDURE — 99999 PR PBB SHADOW E&M-EST. PATIENT-LVL V: ICD-10-PCS | Mod: PBBFAC,,, | Performed by: FAMILY MEDICINE

## 2020-08-03 PROCEDURE — 3008F PR BODY MASS INDEX (BMI) DOCUMENTED: ICD-10-PCS | Mod: CPTII,S$GLB,, | Performed by: FAMILY MEDICINE

## 2020-08-03 PROCEDURE — 3008F BODY MASS INDEX DOCD: CPT | Mod: CPTII,S$GLB,, | Performed by: FAMILY MEDICINE

## 2020-08-03 PROCEDURE — 99999 PR PBB SHADOW E&M-EST. PATIENT-LVL V: CPT | Mod: PBBFAC,,, | Performed by: FAMILY MEDICINE

## 2020-08-03 PROCEDURE — 99214 PR OFFICE/OUTPT VISIT, EST, LEVL IV, 30-39 MIN: ICD-10-PCS | Mod: S$GLB,,, | Performed by: FAMILY MEDICINE

## 2020-08-03 PROCEDURE — 99214 OFFICE O/P EST MOD 30 MIN: CPT | Mod: S$GLB,,, | Performed by: FAMILY MEDICINE

## 2020-08-03 RX ORDER — GABAPENTIN 300 MG/1
300 CAPSULE ORAL 3 TIMES DAILY
Qty: 90 CAPSULE | Refills: 0 | OUTPATIENT
Start: 2020-08-03 | End: 2020-08-07 | Stop reason: SDUPTHER

## 2020-08-03 RX ORDER — BACLOFEN 20 MG/1
20 TABLET ORAL 4 TIMES DAILY
Qty: 60 TABLET | Refills: 0 | OUTPATIENT
Start: 2020-08-03 | End: 2020-09-03 | Stop reason: SDUPTHER

## 2020-08-03 RX ORDER — IBUPROFEN 800 MG/1
800 TABLET ORAL 3 TIMES DAILY
Qty: 30 TABLET | Refills: 0 | Status: SHIPPED | OUTPATIENT
Start: 2020-08-03 | End: 2020-08-18 | Stop reason: SDUPTHER

## 2020-08-03 NOTE — PROGRESS NOTES
Subjective:       Patient ID: David Mohr is a 25 y.o. male.    Chief Complaint: Knee Pain (Left knee)      HPI Comments:       Current Outpatient Medications:     baclofen (LIORESAL) 20 MG tablet, Take 1 tablet (20 mg total) by mouth 4 (four) times daily., Disp: 60 tablet, Rfl: 0    docusate sodium (COLACE) 100 MG capsule, Take 100 mg by mouth., Disp: , Rfl:     gabapentin (NEURONTIN) 300 MG capsule, Take 1 capsule (300 mg total) by mouth 3 (three) times daily., Disp: 90 capsule, Rfl: 0    HYDROcodone-acetaminophen (NORCO) 5-325 mg per tablet, Take 1 tablet by mouth every 8 (eight) hours as needed., Disp: 20 tablet, Rfl: 0    HYDROcodone-acetaminophen (NORCO) 5-325 mg per tablet, Take 1 tablet by mouth every 8 (eight) hours as needed for Pain., Disp: 20 tablet, Rfl: 0    ibuprofen (ADVIL,MOTRIN) 800 MG tablet, Take 1 tablet (800 mg total) by mouth 3 (three) times daily., Disp: 30 tablet, Rfl: 0      Patient is here with an acute problem.  He missed his last 2 appointments with me, as well as the last 2 appointments with orthopedist related to a left knee infection that he had.    Today presents with pain and swelling in his room right knee.  Has a couple of areas of fluctuance that he says a due to calling around on the floor.  Pain seems to be deeper inside the knee and radiates down the lateral aspect of the shin.  No fever chills.  Says he is not using any illicit drugs at this time.  Started taking some old Augmentin that was left over from his previous knee infection.  He has been taking it for the last 2 days.    Also requesting a referral to psychiatry, and requesting refills on his gabapentin and baclofen    Review of Systems   Constitutional: Negative for activity change, appetite change, chills and fever.   HENT: Negative for sore throat.    Respiratory: Negative for cough and shortness of breath.    Cardiovascular: Negative for chest pain.   Gastrointestinal: Negative for abdominal pain,  "diarrhea and nausea.   Genitourinary: Negative for difficulty urinating.   Musculoskeletal: Positive for arthralgias. Negative for myalgias.   Neurological: Positive for headaches. Negative for dizziness.       Objective:      Vitals:    08/03/20 0814   BP: 122/84   Pulse: (!) 128   Resp: 16   Temp: 99.1 °F (37.3 °C)   TempSrc: Temporal   SpO2: 99%   Weight: 70.5 kg (155 lb 5 oz)   Height: 5' 8" (1.727 m)     Physical Exam  Vitals signs and nursing note reviewed.   Constitutional:       General: He is not in acute distress.     Appearance: He is well-developed. He is not diaphoretic.   HENT:      Head: Normocephalic.   Neck:      Musculoskeletal: Neck supple.      Thyroid: No thyromegaly.   Cardiovascular:      Rate and Rhythm: Normal rate and regular rhythm.      Heart sounds: Normal heart sounds. No murmur.   Pulmonary:      Effort: Pulmonary effort is normal.      Breath sounds: Normal breath sounds. No wheezing or rales.   Abdominal:      General: There is no distension.      Palpations: Abdomen is soft.   Musculoskeletal:        Legs:    Lymphadenopathy:      Cervical: No cervical adenopathy.   Skin:     General: Skin is warm and dry.   Neurological:      Mental Status: He is alert and oriented to person, place, and time.   Psychiatric:         Behavior: Behavior normal.         Thought Content: Thought content normal.         Judgment: Judgment normal.         Assessment:       1. Other cerebral palsy    2. Seizure disorder    3. Psychiatric disorder    4. Tobacco abuse    5. Acute pain of right knee    6. Polysubstance abuse        Plan:   Other cerebral palsy  Comments:  Physiatry consult.  For all future refills for baclofen and other muscle relaxants  Orders:  -     Ambulatory referral/consult to Physiatry; Future; Expected date: 08/10/2020    Seizure disorder  Comments:  Patient encouraged to keep his neurology appointment on 09/30    Psychiatric disorder  Comments:  Psych consult per patient " request  Orders:  -     Ambulatory referral/consult to Psychiatry; Future; Expected date: 08/10/2020    Tobacco abuse    Acute pain of right knee  Comments:  Possible infection.  Left knee problem was also never followed up on with ortho.  Ortho consult.  Continue Augmentin  Orders:  -     Ambulatory referral/consult to Orthopedics; Future; Expected date: 08/10/2020    Polysubstance abuse  Comments:  Patient denies drug use    Other orders  -     ibuprofen (ADVIL,MOTRIN) 800 MG tablet; Take 1 tablet (800 mg total) by mouth 3 (three) times daily.  Dispense: 30 tablet; Refill: 0  -     gabapentin (NEURONTIN) 300 MG capsule; Take 1 capsule (300 mg total) by mouth 3 (three) times daily.  Dispense: 90 capsule; Refill: 0  -     baclofen (LIORESAL) 20 MG tablet; Take 1 tablet (20 mg total) by mouth 4 (four) times daily.  Dispense: 60 tablet; Refill: 0

## 2020-08-05 DIAGNOSIS — M25.561 RIGHT KNEE PAIN, UNSPECIFIED CHRONICITY: Primary | ICD-10-CM

## 2020-08-05 RX ORDER — GABAPENTIN 300 MG/1
300 CAPSULE ORAL 3 TIMES DAILY
Qty: 90 CAPSULE | Refills: 0 | OUTPATIENT
Start: 2020-08-05

## 2020-08-05 RX ORDER — TIZANIDINE HYDROCHLORIDE 4 MG/1
CAPSULE, GELATIN COATED ORAL
Qty: 30 CAPSULE | OUTPATIENT
Start: 2020-08-05

## 2020-08-05 RX ORDER — BACLOFEN 20 MG/1
20 TABLET ORAL 4 TIMES DAILY
Qty: 60 TABLET | Refills: 0 | OUTPATIENT
Start: 2020-08-05

## 2020-08-05 NOTE — TELEPHONE ENCOUNTER
Spoke to David thurston and advised him that no new prescription will be sent in because the patient requested a hand written prescription.

## 2020-08-05 NOTE — TELEPHONE ENCOUNTER
Spoke to patient father and states that the pharmacy did not get the prescription for the Baclofen and the Gabapentin. Would like to know if this could be sent in. Spoke to the pharmacy and they do not have these medications on file.

## 2020-08-05 NOTE — TELEPHONE ENCOUNTER
----- Message from Abby Fofana sent at 8/5/2020  3:02 PM CDT -----  Regarding: CALLING CONCERNING MEDICATION NOT CALLED INTO PHARMACY  Contact: PATIENT  PLEASE CALL FATHER @ [phone]. THANKS

## 2020-08-07 ENCOUNTER — TELEPHONE (OUTPATIENT)
Dept: FAMILY MEDICINE | Facility: CLINIC | Age: 25
End: 2020-08-07

## 2020-08-07 RX ORDER — GABAPENTIN 300 MG/1
300 CAPSULE ORAL 3 TIMES DAILY
Qty: 90 CAPSULE | Refills: 0 | Status: SHIPPED | OUTPATIENT
Start: 2020-08-07 | End: 2020-09-03 | Stop reason: SDUPTHER

## 2020-08-07 NOTE — TELEPHONE ENCOUNTER
Pt called. He wants his gabapentin. I see where you sent it in but was it printed? bc it was not electronically sent in. Can I call in a verbal?     Also pt want norco and he wants you to call him. He is deff wanting pain meds.

## 2020-08-12 RX ORDER — AMOXICILLIN 500 MG/1
500 CAPSULE ORAL EVERY 12 HOURS
Qty: 20 CAPSULE | Refills: 0 | OUTPATIENT
Start: 2020-08-12

## 2020-08-12 RX ORDER — BACLOFEN 20 MG/1
20 TABLET ORAL 4 TIMES DAILY
Qty: 60 TABLET | Refills: 0 | OUTPATIENT
Start: 2020-08-12

## 2020-08-12 NOTE — TELEPHONE ENCOUNTER
----- Message from Jillian Samano sent at 8/12/2020  1:18 PM CDT -----  Regarding: pharmacy doesn't have  Contact: pt  1. What is the name of the medication you are requesting? Backlofin, muscle spasms  2. What is the dose? 20mg  3. How do you take the medication? Orally, topically, etc? .  4. How often do you take this medication? .  5. Do you need a 30 day or 90 day supply? .  6. How many refills are you requesting? .  7. What is your preferred pharmacy and location of the pharmacy? [facility], [place]  8. Who can we contact with further questions? [phone]    Also, requesting    1. What is the name of the medication you are requesting? amoxicilin  2. What is the dose? .  3. How do you take the medication? Orally, topically, etc?.  4. How often do you take this medication? .  5. Do you need a 30 day or 90 day supply? .  6. How many refills are you requesting? .  7. What is your preferred pharmacy and location of the pharmacy? .  8. Who can we contact with further questions? .

## 2020-08-18 ENCOUNTER — TELEPHONE (OUTPATIENT)
Dept: ORTHOPEDICS | Facility: CLINIC | Age: 25
End: 2020-08-18

## 2020-08-18 RX ORDER — IBUPROFEN 800 MG/1
800 TABLET ORAL 3 TIMES DAILY
Qty: 30 TABLET | Refills: 0 | Status: SHIPPED | OUTPATIENT
Start: 2020-08-18 | End: 2020-08-26

## 2020-08-18 NOTE — TELEPHONE ENCOUNTER
----- Message from Jillian Samano sent at 8/18/2020  2:38 PM CDT -----  Regarding: ankle pain  Contact: pt  1. What is the name of the medication you are requesting? 800 Ibuprin   2. What is the dose? .  3. How do you take the medication? Orally, topically, etc? .  4. How often do you take this medication? .  5. Do you need a 30 day or 90 day supply? .  6. How many refills are you requesting? .  7. What is your preferred pharmacy and location of the pharmacy? [facility]   8. Who can we contact with further questions? [phone]

## 2020-08-18 NOTE — TELEPHONE ENCOUNTER
----- Message from Jillian Samano sent at 8/18/2020  2:37 PM CDT -----  Regarding: missed appt  Contact: pt  Pt missed appt today and would like to reschedule. Please call pt at [phone]

## 2020-09-02 RX ORDER — IBUPROFEN 800 MG/1
TABLET ORAL
Qty: 30 TABLET | Refills: 0 | Status: SHIPPED | OUTPATIENT
Start: 2020-09-02

## 2020-09-02 NOTE — TELEPHONE ENCOUNTER
Spoke with Soniya Hernadez, pt mother, informing her that Dr. Mckeon requests her presence at pt appointment tomorrow for 9 am. She confirmed appointment and her arrival.

## 2020-09-03 ENCOUNTER — OFFICE VISIT (OUTPATIENT)
Dept: FAMILY MEDICINE | Facility: CLINIC | Age: 25
End: 2020-09-03
Payer: COMMERCIAL

## 2020-09-03 VITALS
OXYGEN SATURATION: 98 % | TEMPERATURE: 99 F | BODY MASS INDEX: 25.35 KG/M2 | HEIGHT: 68 IN | HEART RATE: 102 BPM | DIASTOLIC BLOOD PRESSURE: 78 MMHG | WEIGHT: 167.25 LBS | SYSTOLIC BLOOD PRESSURE: 136 MMHG

## 2020-09-03 DIAGNOSIS — F19.10 POLYSUBSTANCE ABUSE: ICD-10-CM

## 2020-09-03 DIAGNOSIS — M25.561 CHRONIC PAIN OF BOTH KNEES: ICD-10-CM

## 2020-09-03 DIAGNOSIS — M25.562 CHRONIC PAIN OF BOTH KNEES: ICD-10-CM

## 2020-09-03 DIAGNOSIS — Z72.0 TOBACCO ABUSE: ICD-10-CM

## 2020-09-03 DIAGNOSIS — G40.909 SEIZURE DISORDER: ICD-10-CM

## 2020-09-03 DIAGNOSIS — G89.29 CHRONIC PAIN OF BOTH KNEES: ICD-10-CM

## 2020-09-03 DIAGNOSIS — G80.8 OTHER CEREBRAL PALSY: Primary | ICD-10-CM

## 2020-09-03 DIAGNOSIS — F99 PSYCHIATRIC DISORDER: ICD-10-CM

## 2020-09-03 PROCEDURE — 3008F PR BODY MASS INDEX (BMI) DOCUMENTED: ICD-10-PCS | Mod: CPTII,S$GLB,, | Performed by: FAMILY MEDICINE

## 2020-09-03 PROCEDURE — 99214 OFFICE O/P EST MOD 30 MIN: CPT | Mod: S$GLB,,, | Performed by: FAMILY MEDICINE

## 2020-09-03 PROCEDURE — 99214 PR OFFICE/OUTPT VISIT, EST, LEVL IV, 30-39 MIN: ICD-10-PCS | Mod: S$GLB,,, | Performed by: FAMILY MEDICINE

## 2020-09-03 PROCEDURE — 3008F BODY MASS INDEX DOCD: CPT | Mod: CPTII,S$GLB,, | Performed by: FAMILY MEDICINE

## 2020-09-03 PROCEDURE — 99999 PR PBB SHADOW E&M-EST. PATIENT-LVL III: ICD-10-PCS | Mod: PBBFAC,,, | Performed by: FAMILY MEDICINE

## 2020-09-03 PROCEDURE — 99999 PR PBB SHADOW E&M-EST. PATIENT-LVL III: CPT | Mod: PBBFAC,,, | Performed by: FAMILY MEDICINE

## 2020-09-03 RX ORDER — BACLOFEN 20 MG/1
20 TABLET ORAL 4 TIMES DAILY
Qty: 60 TABLET | Refills: 0 | Status: SHIPPED | OUTPATIENT
Start: 2020-09-03 | End: 2020-09-15 | Stop reason: SDUPTHER

## 2020-09-03 RX ORDER — GABAPENTIN 300 MG/1
300 CAPSULE ORAL 3 TIMES DAILY
Qty: 90 CAPSULE | Refills: 0 | Status: SHIPPED | OUTPATIENT
Start: 2020-09-03 | End: 2020-09-30 | Stop reason: SDUPTHER

## 2020-09-03 NOTE — PROGRESS NOTES
Subjective:       Patient ID: David Mohr is a 25 y.o. male.    Chief Complaint: No chief complaint on file.      HPI Comments:       Current Outpatient Medications:     baclofen (LIORESAL) 20 MG tablet, Take 1 tablet (20 mg total) by mouth 4 (four) times daily., Disp: 60 tablet, Rfl: 0    docusate sodium (COLACE) 100 MG capsule, Take 100 mg by mouth., Disp: , Rfl:     gabapentin (NEURONTIN) 300 MG capsule, Take 1 capsule (300 mg total) by mouth 3 (three) times daily., Disp: 90 capsule, Rfl: 0    HYDROcodone-acetaminophen (NORCO) 5-325 mg per tablet, Take 1 tablet by mouth every 8 (eight) hours as needed., Disp: 20 tablet, Rfl: 0    HYDROcodone-acetaminophen (NORCO) 5-325 mg per tablet, Take 1 tablet by mouth every 8 (eight) hours as needed for Pain., Disp: 20 tablet, Rfl: 0    ibuprofen (ADVIL,MOTRIN) 800 MG tablet, TAKE 1 TABLET(800 MG) BY MOUTH THREE TIMES DAILY, Disp: 30 tablet, Rfl: 0   At my request, he is accompanied by his mom Radha.  She seems very interested in solving all the problems we been having.  Main problem is no show Ng at appointments such as physiatry, ortho, psychiatry.  All no shows or cancellations in the month of August.    Today's asking for refills on baclofen and Neurontin.  Sees neurology at the end of the month.      Knees hurt all the time.  In fact he says he hurts all over.  No active problems with the knees recently including redness or swelling above baseline    Review of Systems   Constitutional: Negative for activity change, appetite change and fever.   HENT: Negative for sore throat.    Respiratory: Negative for cough and shortness of breath.    Cardiovascular: Negative for chest pain.   Gastrointestinal: Negative for abdominal pain, diarrhea and nausea.   Genitourinary: Negative for difficulty urinating.   Musculoskeletal: Positive for arthralgias and myalgias.   Neurological: Negative for dizziness and headaches.       Objective:      Vitals:    09/03/20 0910   BP:  "136/78   Pulse: 102   Temp: 98.8 °F (37.1 °C)   TempSrc: Tympanic   SpO2: 98%   Weight: 75.8 kg (167 lb 3.5 oz)   Height: 5' 8" (1.727 m)   PainSc: 10-Worst pain ever   PainLoc: Leg     Physical Exam  Vitals signs and nursing note reviewed.   Constitutional:       General: He is not in acute distress.     Appearance: He is well-developed. He is not diaphoretic.   HENT:      Head: Normocephalic.   Neck:      Musculoskeletal: Neck supple.      Thyroid: No thyromegaly.   Cardiovascular:      Rate and Rhythm: Normal rate and regular rhythm.      Heart sounds: Normal heart sounds. No murmur.   Pulmonary:      Effort: Pulmonary effort is normal.      Breath sounds: Normal breath sounds. No wheezing or rales.   Abdominal:      General: There is no distension.      Palpations: Abdomen is soft.   Musculoskeletal:      Right knee: He exhibits bony tenderness.      Left knee: He exhibits bony tenderness.   Lymphadenopathy:      Cervical: No cervical adenopathy.   Skin:     General: Skin is warm and dry.   Neurological:      Mental Status: He is alert and oriented to person, place, and time.   Psychiatric:         Behavior: Behavior normal.         Thought Content: Thought content normal.         Judgment: Judgment normal.         Assessment:       1. Other cerebral palsy    2. Seizure disorder    3. Psychiatric disorder    4. Polysubstance abuse    5. Tobacco abuse    6. Chronic pain of both knees        Plan:   Other cerebral palsy  Comments:   will see neurology at the end of the month.  Refill baclofen and Neurontin x1 each until then    Seizure disorder  Comments:   neurology consult    Psychiatric disorder  Comments:   psychiatry consult    Polysubstance abuse    Tobacco abuse    Chronic pain of both knees  Comments:   no signs of active infection.  Orthopedic consult    Other orders  -     baclofen (LIORESAL) 20 MG tablet; Take 1 tablet (20 mg total) by mouth 4 (four) times daily.  Dispense: 60 tablet; Refill: 0  -     " gabapentin (NEURONTIN) 300 MG capsule; Take 1 capsule (300 mg total) by mouth 3 (three) times daily.  Dispense: 90 capsule; Refill: 0

## 2020-09-15 RX ORDER — BACLOFEN 20 MG/1
20 TABLET ORAL 4 TIMES DAILY
Qty: 60 TABLET | Refills: 0 | Status: SHIPPED | OUTPATIENT
Start: 2020-09-15

## 2020-09-29 ENCOUNTER — TELEPHONE (OUTPATIENT)
Dept: NEUROLOGY | Facility: CLINIC | Age: 25
End: 2020-09-29

## 2020-09-30 RX ORDER — GABAPENTIN 300 MG/1
300 CAPSULE ORAL 3 TIMES DAILY
Qty: 90 CAPSULE | Refills: 0 | Status: SHIPPED | OUTPATIENT
Start: 2020-09-30 | End: 2020-12-02

## 2021-02-23 NOTE — ED PROVIDER NOTES
"Encounter Date: 5/20/2020       History     Chief Complaint   Patient presents with    Abscess     PT states, "I have an abascess on my left knee."     The history is provided by the patient.   Abscess    This is a new problem. The current episode started two days ago. The problem occurs occasionally. The problem has been rapidly worsening. Affected Location: L knee. The abscess is characterized by redness, painfulness and swelling. Associated symptoms include a fever. Pertinent negatives include no sore throat.     Review of patient's allergies indicates:   Allergen Reactions    Aspirin Anaphylaxis     Past Medical History:   Diagnosis Date    Bipolar 1 disorder     Cerebral palsy      Past Surgical History:   Procedure Laterality Date    ABSCESS DRAINAGE      ham string      medication pump      baclofen pump removal     No family history on file.  Social History     Tobacco Use    Smoking status: Current Some Day Smoker    Smokeless tobacco: Current User     Types: Snuff   Substance Use Topics    Alcohol use: Yes     Comment: socially    Drug use: Yes     Types: Marijuana     Review of Systems   Constitutional: Positive for fever.   HENT: Negative for sore throat.    Respiratory: Negative for shortness of breath.    Cardiovascular: Negative for chest pain.   Gastrointestinal: Negative for nausea.   Genitourinary: Negative for dysuria.   Musculoskeletal: Negative for back pain.        + Swelling and redness to L knee and L lower leg   Skin: Negative for rash.   Neurological: Negative for weakness.   Hematological: Does not bruise/bleed easily.   All other systems reviewed and are negative.      Physical Exam     Initial Vitals [05/20/20 1150]   BP Pulse Resp Temp SpO2   123/71 (!) 123 20 98.8 °F (37.1 °C) 97 %      MAP       --         Physical Exam    Constitutional: He appears well-developed and well-nourished. No distress.   HENT:   Head: Normocephalic and atraumatic.   Nose: Nose normal. "   Mouth/Throat: Uvula is midline and oropharynx is clear and moist.   Eyes: Conjunctivae and EOM are normal. Pupils are equal, round, and reactive to light.   Neck: Normal range of motion. Neck supple.   Cardiovascular: Normal rate and regular rhythm.   Pulmonary/Chest: Effort normal and breath sounds normal. No respiratory distress. He has no decreased breath sounds. He has no wheezes. He has no rales.   Abdominal: Soft. Normal appearance and bowel sounds are normal. There is no tenderness.   Musculoskeletal:        Left knee: He exhibits decreased range of motion, swelling and erythema. Tenderness found.        Legs:  Cellulitis that extends from top of knee to ankle with moderate swelling. There is an abscess just below anterior knee with fluctuance measuring approx 7 cm and abscess over medial malleolus measuring approx 3 cm. See image   Neurological: He is alert and oriented to person, place, and time. He has normal strength. GCS eye subscore is 4. GCS verbal subscore is 5. GCS motor subscore is 6.   Skin: Skin is warm and dry. Capillary refill takes less than 2 seconds. No rash noted.   Psychiatric: He has a normal mood and affect. His speech is normal and behavior is normal.             ED Course   I & D - Incision and Drainage  Date/Time: 5/20/2020 1:38 PM  Performed by: ALEXSANDRA Medina Jr.  Authorized by: ALEXSANDRA Medina Jr.   Consent Done: Yes  Consent: Verbal consent obtained.  Consent given by: patient  Type: abscess  Body area: lower extremity  Location details: left leg  Anesthesia: local infiltration    Anesthesia:  Local Anesthetic: lidocaine 1% without epinephrine  Anesthetic total: 7 mL  Patient sedated: no  Scalpel size: 11  Incision type: single straight  Complexity: simple  Drainage: pus and  bloody  Drainage amount: copious  Wound treatment: incision,  drainage,  deloculation,  expression of material and  wound packed  Packing material: 1/4 in gauze  Complications: No  Patient  tolerance: Patient tolerated the procedure well with no immediate complications    I & D - Incision and Drainage  Date/Time: 5/20/2020 1:38 PM  Performed by: ALEXSANDRA Medina Jr.  Authorized by: ALEXSANDRA Medina Jr.   Consent Done: Yes  Consent: Verbal consent obtained.  Consent given by: patient  Type: abscess  Body area: lower extremity  Location details: left ankle  Anesthesia: local infiltration    Anesthesia:  Local Anesthetic: lidocaine 1% without epinephrine  Anesthetic total: 3 mL  Scalpel size: 11  Incision type: single straight  Complexity: simple  Drainage: pus  Drainage amount: moderate  Wound treatment: incision,  drainage,  deloculation and  expression of material  Packing material: none  Complications: No  Patient tolerance: Patient tolerated the procedure well with no immediate complications        Labs Reviewed   CBC W/ AUTO DIFFERENTIAL - Abnormal; Notable for the following components:       Result Value    WBC 25.33 (*)     RBC 4.36 (*)     Hemoglobin 12.4 (*)     Hematocrit 37.4 (*)     Immature Granulocytes 0.8 (*)     Gran # (ANC) 22.4 (*)     Immature Grans (Abs) 0.19 (*)     Lymph # 0.8 (*)     Mono # 1.8 (*)     Gran% 88.4 (*)     Lymph% 3.2 (*)     All other components within normal limits   COMPREHENSIVE METABOLIC PANEL - Abnormal; Notable for the following components:    Sodium 134 (*)     Glucose 126 (*)     Albumin 2.8 (*)     All other components within normal limits   C-REACTIVE PROTEIN - Abnormal; Notable for the following components:    .0 (*)     All other components within normal limits   SEDIMENTATION RATE - Abnormal; Notable for the following components:    Sed Rate 102 (*)     All other components within normal limits   URINALYSIS, REFLEX TO URINE CULTURE - Abnormal; Notable for the following components:    Protein, UA Trace (*)     Occult Blood UA Trace (*)     Urobilinogen, UA 2.0-3.0 (*)     All other components within normal limits    Narrative:      Preferred Collection Type->Urine, Clean Catch   CULTURE, BLOOD   CULTURE, BLOOD   LACTIC ACID, PLASMA   SARS-COV-2 RNA AMPLIFICATION, QUAL   LACTIC ACID, PLASMA          Imaging Results          X-Ray Knee Complete 4 or More Views Left (Final result)  Result time 05/20/20 12:44:53    Final result by Farzad Jasmnie MD (05/20/20 12:44:53)                 Impression:      As above.      Electronically signed by: Farzad Jasmine  Date:    05/20/2020  Time:    12:44             Narrative:    EXAMINATION:  XR KNEE COMP 4 OR MORE VIEWS LEFT    CLINICAL HISTORY:  Effusion, left knee    TECHNIQUE:  AP, lateral, and Merchant views of the left knee were performed.    COMPARISON:  03/06/2020    FINDINGS:  Prominent soft tissue swelling anterior to the proximal tibia, hematoma versus fluid collection versus focal edema.  No suprapatellar knee joint effusion.  No appreciable fracture or dislocation.  Chronic moderate medial compartmental joint space narrowing.  Generalized edema about the knee soft tissues raising question of cellulitis.                                      2:50 PM: Discussed case with Karen Salazar (Hospital Medicine). Dr. Osman agrees with current care and management of pt and accepts admission.   Admitting Service: Hospital medicine   Admitting Physician: Dawson  Admit to: [facility]                                Clinical Impression:       ICD-10-CM ICD-9-CM   1. Cellulitis of left lower extremity L03.116 682.6   2. Swelling of knee joint, left M25.462 719.06   3. Abscess of left lower extremity L02.416 682.6             ED Disposition Condition    Admit                           Sha Reyez Jr., FNP  05/20/20 3339     Ambulatory

## 2021-09-12 NOTE — CONSULTS
New consult noted for wound vac management and dressing change. Wound vac placed to left distal knee wound 5/21 in OR. Wound care to change wound vac dressing on Monday 5/25. Will follow. If discharged prior to Monday, patient will need HH for wound vac dressing changes and home [facility] wound vac ordered.    Reportable conditions for Patients utilizing Negative Pressure Wound Vac Therapy:  1. Loss of seal, raised foam, or wound drainage suddenly decreasing in amount or stopping.  2. Bright red blood or rapid filling of the cannister  3. Periwound erythema, heat, edema, pain, elevated temperature and white blood cell count, cloudy or foul-smelling wound drainage, excess bleeding, macerated periwound skin  4. Wound drainage becoming foul smelling and cloudy  5. Inability to trouble shoot alarm for greater than two (2) hours    Primary Nurse Assessment should include the following - Document VAC in the NPWT LDA  1. Document system patency  2. Amount and description of wound fluid  3. Pain level  4. Tolerance of NPWT  5. Level of suction   6. Color of foam  7. Any air leak noted     Systemic inflammatory response syndrome (SIRS)

## 2022-01-26 NOTE — ED NOTES
Patient resting in bed, able to make needs known. No acute distress noted. Cart in low position, side rails up x 2 and call bell in reach.   Over Night Events:  Sedation fentanyl and propofol.        ROS:  See HPI    PHYSICAL EXAM    ICU Vital Signs Last 24 Hrs  T(C): 37.2 (26 Jan 2022 07:04), Max: 37.5 (25 Jan 2022 20:15)  T(F): 98.9 (26 Jan 2022 07:04), Max: 99.5 (25 Jan 2022 20:15)  HR: 81 (26 Jan 2022 08:00) (75 - 104)  BP: 126/66 (26 Jan 2022 08:00) (103/57 - 130/89)  BP(mean): 90 (26 Jan 2022 08:00) (73 - 104)  RR: 25 (26 Jan 2022 08:00) (21 - 27)  SpO2: 100% (26 Jan 2022 08:00) (87% - 100%)      General: Ill appearing  HEENT: ET             Lungs: Bilateral crackles  Cardiovascular: Regular   Abdomen: Soft, Positive BS  Extremities: No clubbing   Skin: Warm  Neurological: Non focal       01-25-22 @ 07:01  -  01-26-22 @ 07:00  --------------------------------------------------------  IN:    Enteral Tube Flush: 1120 mL    FentaNYL: 558 mL    Propofol: 660 mL    Vital High Protein: 880 mL  Total IN: 3218 mL    OUT:    Chest Tube (mL): 30 mL    Indwelling Catheter - Urethral (mL): 1520 mL  Total OUT: 1550 mL    Total NET: 1668 mL      01-26-22 @ 07:01  -  01-26-22 @ 09:13  --------------------------------------------------------  IN:    FentaNYL: 25 mL    Propofol: 30 mL    Vital High Protein: 40 mL  Total IN: 95 mL    OUT:    Indwelling Catheter - Urethral (mL): 110 mL  Total OUT: 110 mL    Total NET: -15 mL          LABS:                          8.3    15.46 )-----------( 121      ( 26 Jan 2022 06:15 )             28.5                                               01-26    151<H>  |  113<H>  |  47<H>  ----------------------------<  206<H>  4.7   |  27  |  0.6<L>    Ca    7.1<L>      26 Jan 2022 06:15    TPro  3.9<L>  /  Alb  2.5<L>  /  TBili  0.4  /  DBili  x   /  AST  17  /  ALT  56<H>  /  AlkPhos  55  01-26                                                                                           LIVER FUNCTIONS - ( 26 Jan 2022 06:15 )  Alb: 2.5 g/dL / Pro: 3.9 g/dL / ALK PHOS: 55 U/L / ALT: 56 U/L / AST: 17 U/L / GGT: x                                                  Culture - Blood (collected 23 Jan 2022 11:17)  Source: .Blood Blood-Peripheral  Preliminary Report (24 Jan 2022 23:02):    No growth to date.    Culture - Urine (collected 23 Jan 2022 11:00)  Source: Catheterized Catheterized  Final Report (24 Jan 2022 16:25):    No growth                                                   Mode: AC/ CMV (Assist Control/ Continuous Mandatory Ventilation)  RR (machine): 24  TV (machine): 450  FiO2: 80  PEEP: 12  MAP: 17  PIP: 29                                      ABG - ( 26 Jan 2022 03:20 )  pH, Arterial: 7.33  pH, Blood: x     /  pCO2: 59    /  pO2: 132   / HCO3: 31    / Base Excess: 3.6   /  SaO2: 97.7                MEDICATIONS  (STANDING):  aspirin  chewable 81 milliGRAM(s) Oral daily  chlorhexidine 0.12% Liquid 15 milliLiter(s) Oral Mucosa every 12 hours  chlorhexidine 4% Liquid 1 Application(s) Topical <User Schedule>  dexAMETHasone  Injectable 6 milliGRAM(s) IV Push every 12 hours  dexMEDEtomidine Infusion 0.2 MICROgram(s)/kG/Hr (3.85 mL/Hr) IV Continuous <Continuous>  dextrose 5%. 1000 milliLiter(s) (50 mL/Hr) IV Continuous <Continuous>  dextrose 5%. 1000 milliLiter(s) (100 mL/Hr) IV Continuous <Continuous>  dextrose 50% Injectable 25 Gram(s) IV Push once  dextrose 50% Injectable 12.5 Gram(s) IV Push once  dextrose 50% Injectable 25 Gram(s) IV Push once  enoxaparin Injectable 40 milliGRAM(s) SubCutaneous at bedtime  fentaNYL   Infusion. 0.5 MICROgram(s)/kG/Hr (3.85 mL/Hr) IV Continuous <Continuous>  glucagon  Injectable 1 milliGRAM(s) IntraMuscular once  insulin lispro (ADMELOG) corrective regimen sliding scale   SubCutaneous every 6 hours  norepinephrine Infusion 0.05 MICROgram(s)/kG/Min (3.61 mL/Hr) IV Continuous <Continuous>  pantoprazole  Injectable 40 milliGRAM(s) IV Push daily  polyethylene glycol 3350 17 Gram(s) Oral daily  propofol Infusion 0.05 MICROgram(s)/kG/Min (0.02 mL/Hr) IV Continuous <Continuous>    MEDICATIONS  (PRN):  acetaminophen     Tablet .. 650 milliGRAM(s) Oral every 6 hours PRN Temp greater or equal to 38C (100.4F), Mild Pain (1 - 3)  albuterol/ipratropium for Nebulization 3 milliLiter(s) Nebulizer every 6 hours PRN Shortness of Breath and/or Wheezing  morphine  - Injectable 2 milliGRAM(s) IV Push every 6 hours PRN for severe pain/sedartion      Xrays:      Worsening B/l opacities R>L.  ET(high)  OG ok   Left TLC.                                                                              ECHO

## 2023-06-19 NOTE — PLAN OF CARE
CM met with patient at bedside to assess for discharge needs. Pt states that he lives at home with his mother, and is dependent with some needs. He currently uses a wheelchair, walker and has a wound vac in place in hospital. His mother is his primary help at home, although he is unsure of what discharge needs he will need at this time. CM informed patient that his needs will be dependent on hospital progress at this point. CM provided a transitional care folder, information on advanced directives, information on pharmacy bedside delivery, and discharge planning begins on admission with contact information for any needs/questions.    D/C Plan: TBD  PCP: Dr. Carrington  Preferred Pharmacy: [facility]  Discharge transportation: Family  My [facility]: Pending  Pharmacy Bedside Delivery: Yes       05/21/20 1300   Discharge Assessment   Assessment Type Discharge Planning Assessment   Confirmed/corrected address and phone number on facesheet? Yes   Assessment information obtained from? Patient   Expected Length of Stay (days)   (TBD)   Communicated expected length of stay with patient/caregiver yes   Prior to hospitilization cognitive status: Alert/Oriented   Prior to hospitalization functional status: Independent;Assistive Equipment   Current cognitive status: Alert/Oriented   Current Functional Status: Independent;Assistive Equipment   Facility Arrived From: Home   Lives With parent(s)   Able to Return to Prior Arrangements other (see comments)  (TBD)   Is patient able to care for self after discharge? Unable to determine at this time (comments)   Who are your caregiver(s) and their phone number(s)? Radha Worrell, Mother- [phone]   Patient's perception of discharge disposition   (TBD)   Readmission Within the Last 30 Days no previous admission in last 30 days   Patient currently being followed by outpatient case management? No   Patient currently receives any other outside agency services? No   Equipment Currently  Problem: Potential for Falls  Goal: Patient will remain free of falls  Description: INTERVENTIONS:  - Educate patient/family on patient safety including physical limitations  - Instruct patient to call for assistance with activity   - Consult OT/PT to assist with strengthening/mobility   - Keep Call bell within reach  - Keep bed low and locked with side rails adjusted as appropriate  - Keep care items and personal belongings within reach  - Initiate and maintain comfort rounds  - Make Fall Risk Sign visible to staff  - Offer Toileting every 2 Hours, in advance of need  - Initiate/Maintain bed alarm  - Obtain necessary fall risk management equipment: bed alarm  - Apply yellow socks and bracelet for high fall risk patients  - Consider moving patient to room near nurses station  Outcome: Progressing     Problem: PAIN - ADULT  Goal: Verbalizes/displays adequate comfort level or baseline comfort level  Description: Interventions:  - Encourage patient to monitor pain and request assistance  - Assess pain using appropriate pain scale  - Administer analgesics based on type and severity of pain and evaluate response  - Implement non-pharmacological measures as appropriate and evaluate response  - Consider cultural and social influences on pain and pain management  - Notify physician/advanced practitioner if interventions unsuccessful or patient reports new pain  Outcome: Progressing     Problem: INFECTION - ADULT  Goal: Absence or prevention of progression during hospitalization  Description: INTERVENTIONS:  - Assess and monitor for signs and symptoms of infection  - Monitor lab/diagnostic results  - Monitor all insertion sites, i e  indwelling lines, tubes, and drains  - [place] appropriate cooling/warming therapies per order  - Administer medications as ordered  - Instruct and encourage patient and family to use good hand hygiene technique    Outcome: Progressing     Problem: DISCHARGE PLANNING  Goal: Discharge to home Used at Home wheelchair;walker, standard   Do you have any problems affording any of your prescribed medications? No   Is the patient taking medications as prescribed? yes   Does the patient have transportation home? Yes   Transportation Anticipated other (see comments)  (TBD)   Dialysis Name and Scheduled days NA   Does the patient receive services at [facility]? No   Discharge Plan A   (TBD)   DME Needed Upon Discharge    (TBD)   Patient/Family in Agreement with Plan yes      or other facility with appropriate resources  Description: INTERVENTIONS:  - Identify barriers to discharge w/patient and caregiver  - Arrange for needed discharge resources and transportation as appropriate  - Identify discharge learning needs (meds, wound care, etc )  - Refer to Case Management Department for coordinating discharge planning if the patient needs post-hospital services based on physician/advanced practitioner order or complex needs related to functional status, cognitive ability, or social support system  Outcome: Progressing     Problem: Knowledge Deficit  Goal: Patient/family/caregiver demonstrates understanding of disease process, treatment plan, medications, and discharge instructions  Description: Complete learning assessment and assess knowledge base    Interventions:  - Provide teaching at level of understanding  - Provide teaching via preferred learning methods  Outcome: Progressing     Problem: CARDIOVASCULAR - ADULT  Goal: Maintains optimal cardiac output and hemodynamic stability  Description: INTERVENTIONS:  - Monitor I/O, vital signs and rhythm  - Monitor for S/S and trends of decreased cardiac output  - Administer and titrate ordered vasoactive medications to optimize hemodynamic stability  - Assess quality of pulses, skin color and temperature  - Assess for signs of decreased coronary artery perfusion  - Instruct patient to report change in severity of symptoms  Outcome: Progressing  Goal: Absence of cardiac dysrhythmias or at baseline rhythm  Description: INTERVENTIONS:  - Continuous cardiac monitoring, vital signs, obtain 12 lead EKG if ordered  - Administer antiarrhythmic and heart rate control medications as ordered  - Monitor electrolytes and administer replacement therapy as ordered  Outcome: Progressing     Problem: GASTROINTESTINAL - ADULT  Goal: Maintains adequate nutritional intake  Description: INTERVENTIONS:  - Monitor percentage of each meal consumed  - Identify factors contributing to decreased intake, treat as appropriate  - Assist with meals as needed  - Monitor I&O, weight, and lab values if indicated  - Obtain nutrition services referral as needed  Outcome: Progressing

## (undated) DEVICE — SEE MEDLINE ITEM 157027

## (undated) DEVICE — UNDERGLOVES BIOGEL PI SIZE 8

## (undated) DEVICE — PACKING STRIP IDOFORM 1/4X5YD

## (undated) DEVICE — ELECTRODE REM PLYHSV RETURN 9

## (undated) DEVICE — GLOVE SURG BIOGEL LATEX SZ 7.5

## (undated) DEVICE — UNDERGLOVES BIOGEL PI SIZE 7.5

## (undated) DEVICE — DRAPE CASSETTE 20 X 40

## (undated) DEVICE — SEE MEDLINE ITEM 152622

## (undated) DEVICE — CANISTER VACCUUM DRSNG KCI

## (undated) DEVICE — GLOVE 7.5 PROTEXIS PI ORTHO PF

## (undated) DEVICE — SEE MEDLINE ITEM 157131

## (undated) DEVICE — SYR 10CC LUER LOCK

## (undated) DEVICE — KIT DRESS TRAC SM 10CM X 7.5CM

## (undated) DEVICE — SOL IRR NACL .9% 3000ML

## (undated) DEVICE — SOL NS 1000CC

## (undated) DEVICE — SOL NACL IRR 1000ML BTL

## (undated) DEVICE — SEE MEDLINE ITEM 157117

## (undated) DEVICE — COVER OVERHEAD SURG LT BLUE

## (undated) DEVICE — GAUZE SPONGE 4X4 12PLY

## (undated) DEVICE — DRAPE STERI U-SHAPED 47X51IN

## (undated) DEVICE — MANIFOLD 4 PORT

## (undated) DEVICE — PAD ABD 8X10 STERILE

## (undated) DEVICE — KIT IRR SUCTION HND PIECE

## (undated) DEVICE — SEE MEDLINE ITEM 146308

## (undated) DEVICE — SEE MEDLINE ITEM 157216